# Patient Record
Sex: FEMALE | Race: WHITE | NOT HISPANIC OR LATINO | Employment: OTHER | URBAN - METROPOLITAN AREA
[De-identification: names, ages, dates, MRNs, and addresses within clinical notes are randomized per-mention and may not be internally consistent; named-entity substitution may affect disease eponyms.]

---

## 2018-04-23 ENCOUNTER — CONVERSION ENCOUNTER (OUTPATIENT)
Dept: RADIOLOGY | Facility: IMAGING CENTER | Age: 63
End: 2018-04-23

## 2019-08-27 ENCOUNTER — TRANSCRIBE ORDERS (OUTPATIENT)
Dept: ADMINISTRATIVE | Facility: HOSPITAL | Age: 64
End: 2019-08-27

## 2019-08-27 DIAGNOSIS — Z12.31 VISIT FOR SCREENING MAMMOGRAM: Primary | ICD-10-CM

## 2019-08-29 ENCOUNTER — HOSPITAL ENCOUNTER (OUTPATIENT)
Dept: RADIOLOGY | Facility: IMAGING CENTER | Age: 64
Discharge: HOME | End: 2019-08-29
Payer: MEDICARE

## 2019-08-29 VITALS — BODY MASS INDEX: 28.34 KG/M2 | WEIGHT: 166 LBS | HEIGHT: 64 IN

## 2019-08-29 DIAGNOSIS — Z12.31 VISIT FOR SCREENING MAMMOGRAM: ICD-10-CM

## 2019-08-29 PROCEDURE — 77067 SCR MAMMO BI INCL CAD: CPT

## 2019-09-03 ENCOUNTER — TRANSCRIBE ORDERS (OUTPATIENT)
Dept: ADMINISTRATIVE | Facility: HOSPITAL | Age: 64
End: 2019-09-03

## 2019-09-03 ENCOUNTER — APPOINTMENT (OUTPATIENT)
Dept: RADIOLOGY | Age: 64
End: 2019-09-03
Payer: MEDICARE

## 2019-09-03 DIAGNOSIS — M25.561 RIGHT KNEE PAIN, UNSPECIFIED CHRONICITY: ICD-10-CM

## 2019-09-03 DIAGNOSIS — M25.561 RIGHT KNEE PAIN, UNSPECIFIED CHRONICITY: Primary | ICD-10-CM

## 2019-09-03 PROCEDURE — 73562 X-RAY EXAM OF KNEE 3: CPT

## 2019-10-10 ENCOUNTER — OFFICE VISIT (OUTPATIENT)
Dept: FAMILY MEDICINE CLINIC | Facility: CLINIC | Age: 64
End: 2019-10-10
Payer: MEDICARE

## 2019-10-10 VITALS
SYSTOLIC BLOOD PRESSURE: 160 MMHG | HEART RATE: 48 BPM | WEIGHT: 170 LBS | HEIGHT: 63 IN | DIASTOLIC BLOOD PRESSURE: 80 MMHG | BODY MASS INDEX: 30.12 KG/M2 | OXYGEN SATURATION: 98 % | TEMPERATURE: 97 F

## 2019-10-10 DIAGNOSIS — Z23 NEED FOR PNEUMOCOCCAL VACCINATION: ICD-10-CM

## 2019-10-10 DIAGNOSIS — J31.0 CHRONIC RHINITIS: ICD-10-CM

## 2019-10-10 DIAGNOSIS — I10 BENIGN ESSENTIAL HYPERTENSION: Primary | ICD-10-CM

## 2019-10-10 DIAGNOSIS — I10 UNCONTROLLED HYPERTENSION: ICD-10-CM

## 2019-10-10 DIAGNOSIS — J45.30 MILD PERSISTENT ASTHMA WITHOUT COMPLICATION: ICD-10-CM

## 2019-10-10 DIAGNOSIS — Z23 NEED FOR VIRAL IMMUNIZATION: ICD-10-CM

## 2019-10-10 PROBLEM — F32.5 MAJOR DEPRESSIVE DISORDER WITH SINGLE EPISODE, IN FULL REMISSION (HCC): Status: ACTIVE | Noted: 2019-10-10

## 2019-10-10 PROBLEM — H65.21 RIGHT CHRONIC SEROUS OTITIS MEDIA: Status: ACTIVE | Noted: 2017-11-07

## 2019-10-10 PROCEDURE — 99204 OFFICE O/P NEW MOD 45 MIN: CPT | Performed by: FAMILY MEDICINE

## 2019-10-10 RX ORDER — LORATADINE 10 MG/1
10 TABLET ORAL
COMMUNITY
End: 2020-10-02 | Stop reason: ALTCHOICE

## 2019-10-10 RX ORDER — LOSARTAN POTASSIUM 100 MG/1
100 TABLET ORAL DAILY
Refills: 3 | COMMUNITY
Start: 2019-07-17 | End: 2019-10-10 | Stop reason: ALTCHOICE

## 2019-10-10 RX ORDER — METOPROLOL SUCCINATE 100 MG/1
100 TABLET, EXTENDED RELEASE ORAL DAILY
Refills: 1 | COMMUNITY
Start: 2019-08-13 | End: 2020-02-11 | Stop reason: SDUPTHER

## 2019-10-10 RX ORDER — OLMESARTAN MEDOXOMIL AND HYDROCHLOROTHIAZIDE 20/12.5 20; 12.5 MG/1; MG/1
TABLET ORAL
Qty: 60 TABLET | Refills: 2 | Status: SHIPPED | OUTPATIENT
Start: 2019-10-10 | End: 2019-11-01 | Stop reason: ALTCHOICE

## 2019-10-10 RX ORDER — ALBUTEROL SULFATE 90 UG/1
AEROSOL, METERED RESPIRATORY (INHALATION)
COMMUNITY
Start: 2017-08-03 | End: 2019-10-10 | Stop reason: ALTCHOICE

## 2019-10-10 RX ORDER — AMLODIPINE BESYLATE 5 MG/1
5 TABLET ORAL DAILY
Refills: 3 | COMMUNITY
Start: 2019-07-17 | End: 2020-04-14 | Stop reason: SDUPTHER

## 2019-10-10 NOTE — PROGRESS NOTES
Chief Complaint   Patient presents with    Blood Pressure Check        HPI     75year old female presents to this practice as a new patient, formally followed by me at previous practice  Past medical history significant for hypertension and asthma  She was seen in the emergency room this week because of elevated blood pressure and headache  Blood work was normal   CT scan of the brain was done and was okay  Current medications include metoprolol 100 mg q d , losartan 100 mg q d , amlodipine 5 mg, 1/2 tablet daily  Had been on hydrochlorothiazide in the past but it caused abnormal electrolytes she thinks  She has a log of her blood pressures in the last couple of days which systolics range between 051 and 180  Describes her headache as a 7-8  Taking over-the-counter migraine medication which includes caffeine and acetaminophen  Past history also includes depression and she states she is significantly improved with the use of Zoloft, currently on 50 mg daily  Has a dry cough in the morning  Also a chronic runny nose  In the past, has had bronchospasm and does have a Dulera inhaler but has not been using it  Past Medical History:   Diagnosis Date    Anxiety state 12/19/2014    Benign essential hypertension 1/14/2010    Chronic rhinitis 1/12/2012    Major depressive disorder with single episode, in full remission ([facility] [address] ) 10/10/2019    Mild persistent asthma without complication 0/0/5339    Right chronic serous otitis media 11/7/2017    Added automatically from request for surgery [id number]        Past Surgical History:   Procedure Laterality Date    LAPAROSCOPY      X2 for endometriosis       Social History     Tobacco Use    Smoking status: Never Smoker    Smokeless tobacco: Never Used   Substance Use Topics    Alcohol use: Not on file       Social History     Social History Narrative     since 1981  Two sons  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 reading, gardening, walking, needle work, painting, writing books  Review of Systems   Constitutional: Negative for activity change and appetite change  HENT: Positive for tinnitus ( continuous in both ears for the last 3 days)  Eyes: Negative for visual disturbance  Respiratory: Positive for cough  Negative for shortness of breath and wheezing  Cardiovascular: Negative for chest pain and leg swelling  Gastrointestinal: Negative for abdominal pain, constipation and diarrhea  Genitourinary: Negative for difficulty urinating  Musculoskeletal: Negative for arthralgias and back pain  Skin: Negative for rash  Neurological: Negative for headaches  Psychiatric/Behavioral: Negative for dysphoric mood  The patient is not nervous/anxious  /80 (BP Location: Left arm, Patient Position: Sitting, Cuff Size: Adult)   Pulse (!) 48   Temp (!) 97 °F (36 1 °C) (Tympanic)   Ht 5' 3 39" (1 61 m)   Wt 77 1 kg (170 lb)   SpO2 98%   BMI 29 75 kg/m²      Physical Exam     Pleasant female in no acute distress  Repeat blood pressure 190/86  Myringotomy tube present in wax in the right canal   The ear drum appears normal   The left drum and canal are also normal   Lungs are clear  Heart regular with a rate of 56  No ankle edema present  Mood is OK        Current Outpatient Medications:     Aluminum & Magnesium Hydroxide (MAGNESIUM-ALUMINUM PO), Take by mouth, Disp: , Rfl:     amLODIPine (NORVASC) 5 mg tablet, Take 5 mg by mouth daily Take half tablet daily, Disp: , Rfl: 3    loratadine (CLARITIN) 10 mg tablet, Take 10 mg by mouth, Disp: , Rfl:     metoprolol succinate (TOPROL-XL) 100 mg 24 hr tablet, Take 100 mg by mouth daily, Disp: , Rfl: 1    sertraline (ZOLOFT) 50 mg tablet, Take 50 mg by mouth daily, Disp: , Rfl: 1    olmesartan-hydrochlorothiazide (BENICAR HCT) 20-12 5 MG per tablet, 1-2 tablets daily for blood pressure, Disp: 60 tablet, Rfl: 2     No problem-specific Assessment & Plan notes found for this encounter  Diagnoses and all orders for this visit:    Benign essential hypertension  -     olmesartan-hydrochlorothiazide (BENICAR HCT) 20-12 5 MG per tablet; 1-2 tablets daily for blood pressure    Uncontrolled hypertension  -     Basic metabolic panel; Future    Chronic rhinitis    Mild persistent asthma without complication    Need for viral immunization  -     Cancel: influenza vaccine, 2252-7242, high-dose, PF 0 5 mL (FLUZONE HIGH-DOSE)    Need for pneumococcal vaccination  -     PNEUMOCOCCAL POLYSACCHARIDE VACCINE 23-VALENT =>3YO SQ IM    Other orders  -     Aluminum & Magnesium Hydroxide (MAGNESIUM-ALUMINUM PO); Take by mouth  -     Discontinue: albuterol (PROVENTIL HFA,VENTOLIN HFA) 90 mcg/act inhaler; Inhale  -     amLODIPine (NORVASC) 5 mg tablet; Take 5 mg by mouth daily Take half tablet daily  -     loratadine (CLARITIN) 10 mg tablet; Take 10 mg by mouth  -     Discontinue: losartan (COZAAR) 100 MG tablet; Take 100 mg by mouth daily  -     metoprolol succinate (TOPROL-XL) 100 mg 24 hr tablet; Take 100 mg by mouth daily  -     Discontinue: mometasone-formoterol (DULERA) 100-5 MCG/ACT inhaler; USE 2 PUFFS BY MOUTH TWICE A DAY  -     sertraline (ZOLOFT) 50 mg tablet; Take 50 mg by mouth daily        Patient Instructions    Review of ER visit and blood work  Blood pressure remains elevated  Stop losartan  Begin on olmesartan HCT 20/12 5,  Two tablets daily  If blood pressure drops too much, decreased to 1  Continue 100 mg of metoprolol and 2 5 mg of amlodipine  Mood appears to be well controlled with Zoloft 50 mg  Not needing anything for her chronic rhinitis  Asthma is under control  Discussion of screening colonoscopy which she will consider in the future  Keeping in mind that her mother used to give her enemas when she was young  Pneumovax vaccine   Not available today    Can be obtained at next visit or at the drugstore  High-dose flu shot not available today  [facility]  Recheck 3 weeks with blood work a few days before

## 2019-10-10 NOTE — PATIENT INSTRUCTIONS
Catheter is inserted into the left coronary artery. Angiography performed of the left coronary arteries in multiple views. Angiography performed via hand injection with .  at 4 L/min.  at 8%.  Review of ER visit and blood work  Blood pressure remains elevated  Stop losartan  Begin on olmesartan HCT 20/12 5,  Two tablets daily  If blood pressure drops too much, decreased to 1  Continue 100 mg of metoprolol and 2 5 mg of amlodipine  Mood appears to be well controlled with Zoloft 50 mg  Not needing anything for her chronic rhinitis  Asthma is under control  Discussion of screening colonoscopy which she will consider in the future  Keeping in mind that her mother used to give her enemas when she was young  Pneumovax vaccine   Not available today  Can be obtained at next visit or at the drugstore  High-dose flu shot not available today  Will give at next visit or she can obtain in drugstore  Recheck 3 weeks with blood work a few days before

## 2019-10-24 DIAGNOSIS — Z12.11 COLON CANCER SCREENING: ICD-10-CM

## 2019-10-24 DIAGNOSIS — Z11.59 ENCOUNTER FOR HEPATITIS C SCREENING TEST FOR LOW RISK PATIENT: Primary | ICD-10-CM

## 2019-10-28 ENCOUNTER — APPOINTMENT (OUTPATIENT)
Dept: LAB | Age: 64
End: 2019-10-28
Payer: MEDICARE

## 2019-10-28 DIAGNOSIS — Z11.59 ENCOUNTER FOR HEPATITIS C SCREENING TEST FOR LOW RISK PATIENT: ICD-10-CM

## 2019-10-28 DIAGNOSIS — I10 UNCONTROLLED HYPERTENSION: ICD-10-CM

## 2019-10-28 LAB
ANION GAP SERPL CALCULATED.3IONS-SCNC: 8 MMOL/L (ref 4–13)
BUN SERPL-MCNC: 12 MG/DL (ref 5–25)
CALCIUM SERPL-MCNC: 9.8 MG/DL (ref 8.3–10.1)
CHLORIDE SERPL-SCNC: 94 MMOL/L (ref 100–108)
CO2 SERPL-SCNC: 27 MMOL/L (ref 21–32)
CREAT SERPL-MCNC: 0.66 MG/DL (ref 0.6–1.3)
GFR SERPL CREATININE-BSD FRML MDRD: 92 ML/MIN/1.73SQ M
GLUCOSE SERPL-MCNC: 103 MG/DL (ref 65–140)
HCV AB SER QL: NORMAL
POTASSIUM SERPL-SCNC: 4.8 MMOL/L (ref 3.5–5.3)
SODIUM SERPL-SCNC: 129 MMOL/L (ref 136–145)

## 2019-10-28 PROCEDURE — 80048 BASIC METABOLIC PNL TOTAL CA: CPT

## 2019-10-28 PROCEDURE — 36415 COLL VENOUS BLD VENIPUNCTURE: CPT

## 2019-10-28 PROCEDURE — 86803 HEPATITIS C AB TEST: CPT

## 2019-11-01 ENCOUNTER — OFFICE VISIT (OUTPATIENT)
Dept: FAMILY MEDICINE CLINIC | Facility: CLINIC | Age: 64
End: 2019-11-01
Payer: MEDICARE

## 2019-11-01 VITALS
TEMPERATURE: 96.6 F | WEIGHT: 169 LBS | HEART RATE: 60 BPM | SYSTOLIC BLOOD PRESSURE: 142 MMHG | OXYGEN SATURATION: 94 % | BODY MASS INDEX: 28.85 KG/M2 | HEIGHT: 64 IN | DIASTOLIC BLOOD PRESSURE: 80 MMHG

## 2019-11-01 DIAGNOSIS — Z23 NEED FOR PNEUMOCOCCAL VACCINATION: ICD-10-CM

## 2019-11-01 DIAGNOSIS — F32.5 MAJOR DEPRESSIVE DISORDER WITH SINGLE EPISODE, IN FULL REMISSION (HCC): ICD-10-CM

## 2019-11-01 DIAGNOSIS — R51.9 NONINTRACTABLE EPISODIC HEADACHE, UNSPECIFIED HEADACHE TYPE: ICD-10-CM

## 2019-11-01 DIAGNOSIS — I10 BENIGN ESSENTIAL HYPERTENSION: Primary | ICD-10-CM

## 2019-11-01 DIAGNOSIS — E87.1 HYPONATREMIA: ICD-10-CM

## 2019-11-01 DIAGNOSIS — Z00.00 MEDICARE ANNUAL WELLNESS VISIT, SUBSEQUENT: ICD-10-CM

## 2019-11-01 DIAGNOSIS — Z23 NEED FOR INFLUENZA VACCINATION: ICD-10-CM

## 2019-11-01 PROCEDURE — G0438 PPPS, INITIAL VISIT: HCPCS

## 2019-11-01 PROCEDURE — G0008 ADMIN INFLUENZA VIRUS VAC: HCPCS

## 2019-11-01 PROCEDURE — 99214 OFFICE O/P EST MOD 30 MIN: CPT

## 2019-11-01 PROCEDURE — G0009 ADMIN PNEUMOCOCCAL VACCINE: HCPCS

## 2019-11-01 PROCEDURE — 90732 PPSV23 VACC 2 YRS+ SUBQ/IM: CPT

## 2019-11-01 PROCEDURE — 90662 IIV NO PRSV INCREASED AG IM: CPT

## 2019-11-01 RX ORDER — OLMESARTAN MEDOXOMIL 40 MG/1
40 TABLET ORAL DAILY
Qty: 90 TABLET | Refills: 3 | Status: SHIPPED | OUTPATIENT
Start: 2019-11-01 | End: 2020-12-03 | Stop reason: SDUPTHER

## 2019-11-01 NOTE — PROGRESS NOTES
Chief Complaint   Patient presents with    Medicare Wellness Visit    Hypertension    Depression        HPI   Here for follow-up of hypertension and Medicare wellness exam     Jason Ziegler all this started in HCT and blood pressure readings are considerably improved  Most of the readings in the last week showed systolics between 835 and 140  She did decrease the Benicar HCT to 1 tablet instead of 2 because the blood pressures were okay  She does continue with headaches which respond to 2 extra Strength Tylenol, usually twice a day  Mood is well controlled with Zoloft  Presently has a cold which she blames on her grandchild  Notes that she has cut back significantly on alcohol  She uses a crutch when things were bad at home  Spent some time with her brother who is a recovering alcoholic and found that she did not need the alcohol  Subsequently, things are better at home  Past Medical History:   Diagnosis Date    Anxiety state 12/19/2014    Benign essential hypertension 1/14/2010    Chronic rhinitis 1/12/2012    Major depressive disorder with single episode, in full remission ([facility] [address] ) 10/10/2019    Mild persistent asthma without complication 6/5/6338    Right chronic serous otitis media 11/7/2017    Added automatically from request for surgery [id number]        Past Surgical History:   Procedure Laterality Date    LAPAROSCOPY      X2 for endometriosis       Social History     Tobacco Use    Smoking status: Never Smoker    Smokeless tobacco: Never Used   Substance Use Topics    Alcohol use: Yes     Comment: socially       Social History     Social History Narrative     since 1981  Two sons  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, gardening, walking, needle work, painting, writing books          The following portions of the patient's history were reviewed and updated as appropriate: allergies, current medications, past family history, past medical history, past social history, past surgical history and problem list       Review of Systems   Constitutional: Negative for activity change and appetite change  Eyes: Negative for visual disturbance  Respiratory: Negative for shortness of breath and wheezing  Cardiovascular: Negative for chest pain and leg swelling  Gastrointestinal: Negative for abdominal pain, constipation and diarrhea  Genitourinary: Negative for difficulty urinating  Musculoskeletal: Negative for arthralgias and back pain  Skin: Negative for rash  Neurological: Negative for headaches  Psychiatric/Behavioral: Negative for dysphoric mood  The patient is not nervous/anxious  /80   Pulse 60   Temp (!) 96 6 °F (35 9 °C) (Tympanic)   Ht 5' 3 78" (1 62 m)   Wt 76 7 kg (169 lb)   SpO2 94%   BMI 29 21 kg/m²      Physical Exam     Appears well  Log of blood pressures reviewed  Lungs are clear  Heart regular  Affect is bright  Current Outpatient Medications:     Aluminum & Magnesium Hydroxide (MAGNESIUM-ALUMINUM PO), Take by mouth, Disp: , Rfl:     amLODIPine (NORVASC) 5 mg tablet, Take 5 mg by mouth daily Take half tablet daily, Disp: , Rfl: 3    loratadine (CLARITIN) 10 mg tablet, Take 10 mg by mouth, Disp: , Rfl:     metoprolol succinate (TOPROL-XL) 100 mg 24 hr tablet, Take 100 mg by mouth daily, Disp: , Rfl: 1    olmesartan (BENICAR) 40 mg tablet, Take 1 tablet (40 mg total) by mouth daily, Disp: 90 tablet, Rfl: 3    sertraline (ZOLOFT) 50 mg tablet, Take 1 tablet (50 mg total) by mouth daily, Disp: 90 tablet, Rfl: 3     No problem-specific Assessment & Plan notes found for this encounter  Diagnoses and all orders for this visit:    Benign essential hypertension  -     olmesartan (BENICAR) 40 mg tablet; Take 1 tablet (40 mg total) by mouth daily    Hyponatremia  -     Basic metabolic panel;  Future    Medicare annual wellness visit, subsequent    Major depressive disorder with single episode, in full remission ([facility] [address] )  -     sertraline (ZOLOFT) 50 mg tablet; Take 1 tablet (50 mg total) by mouth daily    Need for influenza vaccination  -     influenza vaccine, 4645-4526, high-dose, PF 0 5 mL (FLUZONE HIGH-DOSE)    Nonintractable episodic headache, unspecified headache type    Need for pneumococcal vaccination  -     PNEUMOCOCCAL POLYSACCHARIDE VACCINE 23-VALENT =>1YO SQ IM        Patient Instructions    Blood pressure is significantly improved  Sodium is still low and the hyponatremia may be causing headaches  Change olmesartan HCT to olmesartan alone 40 mg once daily  Continue amlodipine and metoprolol  continue sertraline 50 mg daily which is working well for her mood  Okay to use Tylenol for headaches which hopefully will go away with stopping the diuretic  Try to decrease water intake  Flu shot and Pneumovax 23  Medicare wellness exam is completed  Recheck in 3 months with blood work to include sodium 1 week before  Weight Management   AMBULATORY CARE:   Why it is important to manage your weight:  Being overweight increases your risk of health conditions such as heart disease, high blood pressure, type 2 diabetes, and certain types of cancer  It can also increase your risk for osteoarthritis, sleep apnea, and other respiratory problems  Aim for a slow, steady weight loss  Even a small amount of weight loss can lower your risk of health problems  How to lose weight safely:  A safe and healthy way to lose weight is to eat fewer calories and get regular exercise  You can lose up about 1 pound a week by decreasing the number of calories you eat by 500 calories each day  You can decrease calories by eating smaller portion sizes or by cutting out high-calorie foods  Read labels to find out how many calories are in the foods you eat  You can also burn calories with exercise such as walking, swimming, or biking   You will be more likely to keep weight off if you make these changes part of your lifestyle  Healthy meal plan for weight management:  A healthy meal plan includes a variety of foods, contains fewer calories, and helps you stay healthy  A healthy meal plan includes the following:  · Eat whole-grain foods more often  A healthy meal plan should contain fiber  Fiber is the part of grains, fruits, and vegetables that is not broken down by your body  Whole-grain foods are healthy and provide extra fiber in your diet  Some examples of whole-grain foods are whole-wheat breads and pastas, oatmeal, brown rice, and bulgur  · Eat a variety of vegetables every day  Include dark, leafy greens such as spinach, kale, shukri greens, and mustard greens  Eat yellow and orange vegetables such as carrots, sweet potatoes, and winter squash  · Eat a variety of fruits every day  Choose fresh or canned fruit (canned in its own juice or light syrup) instead of juice  Fruit juice has very little or no fiber  · Eat low-fat dairy foods  Drink fat-free (skim) milk or 1% milk  Eat fat-free yogurt and low-fat cottage cheese  Try low-fat cheeses such as mozzarella and other reduced-fat cheeses  · Choose meat and other protein foods that are low in fat  Choose beans or other legumes such as split peas or lentils  Choose fish, skinless poultry (chicken or turkey), or lean cuts of red meat (beef or pork)  Before you cook meat or poultry, cut off any visible fat  · Use less fat and oil  Try baking foods instead of frying them  Add less fat, such as margarine, sour cream, regular salad dressing and mayonnaise to foods  Eat fewer high-fat foods  Some examples of high-fat foods include french fries, doughnuts, ice cream, and cakes  · Eat fewer sweets  Limit foods and drinks that are high in sugar  This includes candy, cookies, regular soda, and sweetened drinks  Ways to decrease calories:   · Eat smaller portions       ¨ Use a small plate with smaller servings  ¨ Do not eat second helpings  ¨ When you eat at a restaurant, ask for a box and place half of your meal in the box before you eat  ¨ Share an entrée with someone else  · Replace high-calorie snacks with healthy, low-calorie snacks  ¨ Choose fresh fruit, vegetables, fat-free rice cakes, or air-popped popcorn instead of potato chips, nuts, or chocolate  ¨ Choose water or calorie-free drinks instead of soda or sweetened drinks  · Eat regular meals  Skipping meals can lead to overeating later in the day  Eat a healthy snack in place of a meal if you do not have time to eat a regular meal      · Do not shop for groceries when you are hungry  You may be more likely to make unhealthy food choices  Take a grocery list of healthy foods and shop after you have eaten  Exercise:  Exercise at least 30 minutes per day on most days of the week  Some examples of exercise include walking, biking, dancing, and swimming  You can also fit in more physical activity by taking the stairs instead of the elevator or parking farther away from stores  Ask your healthcare provider about the best exercise plan for you  Other things to consider as you try to lose weight:   · Be aware of situations that may give you the urge to overeat, such as eating while watching television  Find ways to avoid these situations  For example, read a book, go for a walk, or do crafts  · Meet with a weight loss support group or friends who are also trying to lose weight  This may help you stay motivated to continue working on your weight loss goals  © 2017 [employer]  Information is for End User's use only and may not be sold, redistributed or otherwise used for commercial purposes  All illustrations and images included in CareNotes® are the copyrighted property of [employer]  or [employer]  The above information is an  only   It is not intended as medical advice for individual conditions or treatments  Talk to your doctor, nurse or pharmacist before following any medical regimen to see if it is safe and effective for you  Heart Healthy Diet   AMBULATORY CARE:   A heart healthy diet  is an eating plan low in total fat, unhealthy fats, and sodium (salt)  A heart healthy diet helps decrease your risk for heart disease and stroke  Limit the amount of fat you eat to 25% to 35% of your total daily calories  Limit sodium to less than 2,300 mg each day  Healthy fats:  Healthy fats can help improve cholesterol levels  The risk for heart disease is decreased when cholesterol levels are normal  Choose healthy fats, such as the following:  · Unsaturated fat  is found in foods such as soybean, canola, olive, corn, and safflower oils  It is also found in soft tub margarine that is made with liquid vegetable oil  · Omega-3 fat  is found in certain fish, such as salmon, tuna, and trout, and in walnuts and flaxseed  Unhealthy fats:  Unhealthy fats can cause unhealthy cholesterol levels in your blood and increase your risk of heart disease  Limit unhealthy fats, such as the following:  · Cholesterol  is found in animal foods, such as eggs and lobster, and in dairy products made from whole milk  Limit cholesterol to less than 300 milligrams (mg) each day  You may need to limit cholesterol to 200 mg each day if you have heart disease  · Saturated fat  is found in meats, such as johnston and hamburger  It is also found in chicken or turkey skin, whole milk, and butter  Limit saturated fat to less than 7% of your total daily calories  Limit saturated fat to less than 6% if you have heart disease or are at increased risk for it  · Trans fat  is found in packaged foods, such as potato chips and cookies  It is also in hard margarine, some fried foods, and shortening  Avoid trans fats as much as possible    Heart healthy foods and drinks to include:  Ask your dietitian or healthcare provider how many servings to have from each of the following food groups:  · Grains:      ¨ Whole-wheat breads, cereals, and pastas, and brown rice    ¨ Low-fat, low-sodium crackers and chips    · Vegetables:      ¨ Broccoli, green beans, green peas, and spinach    ¨ Collards, kale, and lima beans    ¨ Carrots, sweet potatoes, tomatoes, and peppers    ¨ Canned vegetables with no salt added    · Fruits:      ¨ Bananas, peaches, pears, and pineapple    ¨ Grapes, raisins, and dates    ¨ Oranges, tangerines, grapefruit, orange juice, and grapefruit juice    ¨ Apricots, mangoes, melons, and papaya    ¨ Raspberries and strawberries    ¨ Canned fruit with no added sugar    · Low-fat dairy products:      ¨ Nonfat (skim) milk, 1% milk, and low-fat almond, cashew, or soy milks fortified with calcium    ¨ Low-fat cheese, regular or frozen yogurt, and cottage cheese    · Meats and proteins , such as lean cuts of beef and pork (loin, leg, round), skinless chicken and turkey, legumes, soy products, egg whites, and nuts  Foods and drinks to limit or avoid:  Ask your dietitian or healthcare provider about these and other foods that are high in unhealthy fat, sodium, and sugar:  · Snack or packaged foods , such as frozen dinners, cookies, macaroni and cheese, and cereals with more than 300 mg of sodium per serving    · Canned or dry mixes  for cakes, soups, sauces, or gravies    · Vegetables with added sodium , such as instant potatoes, vegetables with added sauces, or regular canned vegetables    · Other foods high in sodium , such as ketchup, barbecue sauce, salad dressing, pickles, olives, soy sauce, and miso    · High-fat dairy foods  such as whole or 2% milk, cream cheese, or sour cream, and cheeses     · High-fat protein foods  such as high-fat cuts of beef (T-bone steaks, ribs), chicken or turkey with skin, and organ meats, such as liver    · Cured or smoked meats , such as hot dogs, johnston, and sausage    · Unhealthy fats and oils , such as butter, stick margarine, shortening, and cooking oils such as coconut or palm oil    · Food and drinks high in sugar , such as soft drinks (soda), sports drinks, sweetened tea, candy, cake, cookies, pies, and doughnuts  Other diet guidelines to follow:   · Eat more foods containing omega-3 fats  Eat fish high in omega-3 fats at least 2 times a week  · Limit alcohol  Too much alcohol can damage your heart and raise your blood pressure  Women should limit alcohol to 1 drink a day  Men should limit alcohol to 2 drinks a day  A drink of alcohol is 12 ounces of beer, 5 ounces of wine, or 1½ ounces of liquor  · Choose low-sodium foods  High-sodium foods can lead to high blood pressure  Add little or no salt to food you prepare  Use herbs and spices in place of salt  · Eat more fiber  to help lower cholesterol levels  Eat at least 5 servings of fruits and vegetables each day  Eat 3 ounces of whole-grain foods each day  Legumes (beans) are also a good source of fiber  Lifestyle guidelines:   · Do not smoke  Nicotine and other chemicals in cigarettes and cigars can cause lung and heart damage  Ask your healthcare provider for information if you currently smoke and need help to quit  E-cigarettes or smokeless tobacco still contain nicotine  Talk to your healthcare provider before you use these products  · Exercise regularly  to help you maintain a healthy weight and improve your blood pressure and cholesterol levels  Ask your healthcare provider about the best exercise plan for you  Do not start an exercise program without asking your healthcare provider  Follow up with your healthcare provider as directed:  Write down your questions so you remember to ask them during your visits  © 2017 [employer] Information is for End User's use only and may not be sold, redistributed or otherwise used for commercial purposes   All illustrations and images included in CareNotes® are the copyrighted property of [employer]  or [employer]  The above information is an  only  It is not intended as medical advice for individual conditions or treatments  Talk to your doctor, nurse or pharmacist before following any medical regimen to see if it is safe and effective for you  BMI Counseling: Body mass index is 29 21 kg/m²  The BMI is above normal  Nutrition recommendations include reducing portion sizes, 3-5 servings of fruits/vegetables daily, reducing fast food intake, consuming healthier snacks and decreasing soda and/or juice intake

## 2019-11-01 NOTE — PROGRESS NOTES
Justa Peng is here for her Welcome to Medicare visit       Health Risk Assessment:   Patient rates overall health as good  Patient feels that their physical health rating is same  Eyesight was rated as same  Hearing was rated as slightly worse  Patient feels that their emotional and mental health rating is same  Pain experienced in the last 7 days has been some  Patient's pain rating has been 5/10  Patient states that she has experienced no weight loss or gain in last 6 months       Depression Screening:   PHQ-2 Score: 0  PHQ-9 Score: 0       Fall Risk Screening: In the past year, patient has experienced: no history of falling in past year       Urinary Incontinence Screening:   Patient has leaked urine accidently in the last six months  Little leaking     Home Safety:  Patient does not have trouble with stairs inside or outside of their home  Patient has working smoke alarms and has no working carbon monoxide detector  Home safety hazards include: none       Nutrition:   Current diet is Regular       Medications:   Patient is currently taking over-the-counter supplements  OTC medications include: see medication list  Patient is able to manage medications       Activities of Daily Living (ADLs)/Instrumental Activities of Daily Living (IADLs):   Walk and transfer into and out of bed and chair?: Yes  Dress and groom yourself?: Yes    Bathe or shower yourself?: Yes    Feed yourself? Yes  Do your laundry/housekeeping?: Yes  Manage your money, pay your bills and track your expenses?: Yes  Make your own meals?: Yes    Do your own shopping?: Yes     Previous Hospitalizations:   Any hospitalizations or ED visits within the last 12 months?: No       Advance Care Planning:   Living will: Yes    Advanced directive:  Yes       PREVENTIVE SCREENINGS       Cardiovascular Screening:    General: Screening Current       Diabetes Screening:     General: Screening Current       Breast Cancer Screening:     General: Screening Current    Cervical Cancer Screening:    General: Screening Not Indicated       Hepatitis C Screening:    General: Screening Current

## 2019-11-01 NOTE — PROGRESS NOTES
Aníbal Pedroza is here for her Welcome to Medicare visit  Health Risk Assessment:   Patient rates overall health as good  Patient feels that their physical health rating is same  Eyesight was rated as same  Hearing was rated as slightly worse  Patient feels that their emotional and mental health rating is same  Pain experienced in the last 7 days has been some  Patient's pain rating has been 5/10  Patient states that she has experienced no weight loss or gain in last 6 months  Depression Screening:   PHQ-2 Score: 0  PHQ-9 Score: 0      Fall Risk Screening: In the past year, patient has experienced: no history of falling in past year      Urinary Incontinence Screening:   Patient has leaked urine accidently in the last six months  Little leaking    Home Safety:  Patient does not have trouble with stairs inside or outside of their home  Patient has working smoke alarms and has no working carbon monoxide detector  Home safety hazards include: none  Nutrition:   Current diet is Regular  Medications:   Patient is currently taking over-the-counter supplements  OTC medications include: see medication list  Patient is able to manage medications  Activities of Daily Living (ADLs)/Instrumental Activities of Daily Living (IADLs):   Walk and transfer into and out of bed and chair?: Yes  Dress and groom yourself?: Yes    Bathe or shower yourself?: Yes    Feed yourself?  Yes  Do your laundry/housekeeping?: Yes  Manage your money, pay your bills and track your expenses?: Yes  Make your own meals?: Yes    Do your own shopping?: Yes    Previous Hospitalizations:   Any hospitalizations or ED visits within the last 12 months?: No      Advance Care Planning:   Living will: Yes    Advanced directive: Yes      PREVENTIVE SCREENINGS      Cardiovascular Screening:    General: Screening Current      Diabetes Screening:     General: Screening Current      Breast Cancer Screening:     General: Screening Current      Cervical Cancer Screening:    General: Screening Not Indicated      Hepatitis C Screening:    General: Screening Current

## 2019-11-01 NOTE — PATIENT INSTRUCTIONS
Blood pressure is significantly improved  Sodium is still low and the hyponatremia may be causing headaches  Change olmesartan HCT to olmesartan alone 40 mg once daily  Continue amlodipine and metoprolol  continue sertraline 50 mg daily which is working well for her mood  Okay to use Tylenol for headaches which hopefully will go away with stopping the diuretic  Try to decrease water intake  Flu shot and Pneumovax 23  Medicare wellness exam is completed  Recheck in 3 months with blood work to include sodium 1 week before  Weight Management   AMBULATORY CARE:   Why it is important to manage your weight:  Being overweight increases your risk of health conditions such as heart disease, high blood pressure, type 2 diabetes, and certain types of cancer  It can also increase your risk for osteoarthritis, sleep apnea, and other respiratory problems  Aim for a slow, steady weight loss  Even a small amount of weight loss can lower your risk of health problems  How to lose weight safely:  A safe and healthy way to lose weight is to eat fewer calories and get regular exercise  You can lose up about 1 pound a week by decreasing the number of calories you eat by 500 calories each day  You can decrease calories by eating smaller portion sizes or by cutting out high-calorie foods  Read labels to find out how many calories are in the foods you eat  You can also burn calories with exercise such as walking, swimming, or biking  You will be more likely to keep weight off if you make these changes part of your lifestyle  Healthy meal plan for weight management:  A healthy meal plan includes a variety of foods, contains fewer calories, and helps you stay healthy  A healthy meal plan includes the following:  · Eat whole-grain foods more often  A healthy meal plan should contain fiber  Fiber is the part of grains, fruits, and vegetables that is not broken down by your body   Whole-grain foods are healthy and provide extra fiber in your diet  Some examples of whole-grain foods are whole-wheat breads and pastas, oatmeal, brown rice, and bulgur  · Eat a variety of vegetables every day  Include dark, leafy greens such as spinach, kale, shukri greens, and mustard greens  Eat yellow and orange vegetables such as carrots, sweet potatoes, and winter squash  · Eat a variety of fruits every day  Choose fresh or canned fruit (canned in its own juice or light syrup) instead of juice  Fruit juice has very little or no fiber  · Eat low-fat dairy foods  Drink fat-free (skim) milk or 1% milk  Eat fat-free yogurt and low-fat cottage cheese  Try low-fat cheeses such as mozzarella and other reduced-fat cheeses  · Choose meat and other protein foods that are low in fat  Choose beans or other legumes such as split peas or lentils  Choose fish, skinless poultry (chicken or turkey), or lean cuts of red meat (beef or pork)  Before you cook meat or poultry, cut off any visible fat  · Use less fat and oil  Try baking foods instead of frying them  Add less fat, such as margarine, sour cream, regular salad dressing and mayonnaise to foods  Eat fewer high-fat foods  Some examples of high-fat foods include french fries, doughnuts, ice cream, and cakes  · Eat fewer sweets  Limit foods and drinks that are high in sugar  This includes candy, cookies, regular soda, and sweetened drinks  Ways to decrease calories:   · Eat smaller portions  ¨ Use a small plate with smaller servings  ¨ Do not eat second helpings  ¨ When you eat at a restaurant, ask for a box and place half of your meal in the box before you eat  ¨ Share an entrée with someone else  · Replace high-calorie snacks with healthy, low-calorie snacks  ¨ Choose fresh fruit, vegetables, fat-free rice cakes, or air-popped popcorn instead of potato chips, nuts, or chocolate      ¨ Choose water or calorie-free drinks instead of soda or sweetened drinks  · Eat regular meals  Skipping meals can lead to overeating later in the day  Eat a healthy snack in place of a meal if you do not have time to eat a regular meal      · Do not shop for groceries when you are hungry  You may be more likely to make unhealthy food choices  Take a grocery list of healthy foods and shop after you have eaten  Exercise:  Exercise at least 30 minutes per day on most days of the week  Some examples of exercise include walking, biking, dancing, and swimming  You can also fit in more physical activity by taking the stairs instead of the elevator or parking farther away from stores  Ask your healthcare provider about the best exercise plan for you  Other things to consider as you try to lose weight:   · Be aware of situations that may give you the urge to overeat, such as eating while watching television  Find ways to avoid these situations  For example, read a book, go for a walk, or do crafts  · Meet with a weight loss support group or friends who are also trying to lose weight  This may help you stay motivated to continue working on your weight loss goals  © 2017 [address] [facility] Information is for End User's use only and may not be sold, redistributed or otherwise used for commercial purposes  All illustrations and images included in CareNotes® are the copyrighted property of [employer]  or [employer]  The above information is an  only  It is not intended as medical advice for individual conditions or treatments  Talk to your doctor, nurse or pharmacist before following any medical regimen to see if it is safe and effective for you  Heart Healthy Diet   AMBULATORY CARE:   A heart healthy diet  is an eating plan low in total fat, unhealthy fats, and sodium (salt)  A heart healthy diet helps decrease your risk for heart disease and stroke  Limit the amount of fat you eat to 25% to 35% of your total daily calories   Limit sodium to less than 2,300 mg each day  Healthy fats:  Healthy fats can help improve cholesterol levels  The risk for heart disease is decreased when cholesterol levels are normal  Choose healthy fats, such as the following:  · Unsaturated fat  is found in foods such as soybean, canola, olive, corn, and safflower oils  It is also found in soft tub margarine that is made with liquid vegetable oil  · Omega-3 fat  is found in certain fish, such as salmon, tuna, and trout, and in walnuts and flaxseed  Unhealthy fats:  Unhealthy fats can cause unhealthy cholesterol levels in your blood and increase your risk of heart disease  Limit unhealthy fats, such as the following:  · Cholesterol  is found in animal foods, such as eggs and lobster, and in dairy products made from whole milk  Limit cholesterol to less than 300 milligrams (mg) each day  You may need to limit cholesterol to 200 mg each day if you have heart disease  · Saturated fat  is found in meats, such as johnston and hamburger  It is also found in chicken or turkey skin, whole milk, and butter  Limit saturated fat to less than 7% of your total daily calories  Limit saturated fat to less than 6% if you have heart disease or are at increased risk for it  · Trans fat  is found in packaged foods, such as potato chips and cookies  It is also in hard margarine, some fried foods, and shortening  Avoid trans fats as much as possible    Heart healthy foods and drinks to include:  Ask your dietitian or healthcare provider how many servings to have from each of the following food groups:  · Grains:      ¨ Whole-wheat breads, cereals, and pastas, and brown rice    ¨ Low-fat, low-sodium crackers and chips    · Vegetables:      ¨ Broccoli, green beans, green peas, and spinach    ¨ Collards, kale, and lima beans    ¨ Carrots, sweet potatoes, tomatoes, and peppers    ¨ Canned vegetables with no salt added    · Fruits:      ¨ Bananas, peaches, pears, and pineapple    ¨ Grapes, raisins, and dates    ¨ Oranges, tangerines, grapefruit, orange juice, and grapefruit juice    ¨ Apricots, mangoes, melons, and papaya    ¨ Raspberries and strawberries    ¨ Canned fruit with no added sugar    · Low-fat dairy products:      ¨ Nonfat (skim) milk, 1% milk, and low-fat almond, cashew, or soy milks fortified with calcium    ¨ Low-fat cheese, regular or frozen yogurt, and cottage cheese    · Meats and proteins , such as lean cuts of beef and pork (loin, leg, round), skinless chicken and turkey, legumes, soy products, egg whites, and nuts  Foods and drinks to limit or avoid:  Ask your dietitian or healthcare provider about these and other foods that are high in unhealthy fat, sodium, and sugar:  · Snack or packaged foods , such as frozen dinners, cookies, macaroni and cheese, and cereals with more than 300 mg of sodium per serving    · Canned or dry mixes  for cakes, soups, sauces, or gravies    · Vegetables with added sodium , such as instant potatoes, vegetables with added sauces, or regular canned vegetables    · Other foods high in sodium , such as ketchup, barbecue sauce, salad dressing, pickles, olives, soy sauce, and miso    · High-fat dairy foods  such as whole or 2% milk, cream cheese, or sour cream, and cheeses     · High-fat protein foods  such as high-fat cuts of beef (T-bone steaks, ribs), chicken or turkey with skin, and organ meats, such as liver    · Cured or smoked meats , such as hot dogs, johnston, and sausage    · Unhealthy fats and oils , such as butter, stick margarine, shortening, and cooking oils such as coconut or palm oil    · Food and drinks high in sugar , such as soft drinks (soda), sports drinks, sweetened tea, candy, cake, cookies, pies, and doughnuts  Other diet guidelines to follow:   · Eat more foods containing omega-3 fats  Eat fish high in omega-3 fats at least 2 times a week  · Limit alcohol  Too much alcohol can damage your heart and raise your blood pressure  Women should limit alcohol to 1 drink a day  Men should limit alcohol to 2 drinks a day  A drink of alcohol is 12 ounces of beer, 5 ounces of wine, or 1½ ounces of liquor  · Choose low-sodium foods  High-sodium foods can lead to high blood pressure  Add little or no salt to food you prepare  Use herbs and spices in place of salt  · Eat more fiber  to help lower cholesterol levels  Eat at least 5 servings of fruits and vegetables each day  Eat 3 ounces of whole-grain foods each day  Legumes (beans) are also a good source of fiber  Lifestyle guidelines:   · Do not smoke  Nicotine and other chemicals in cigarettes and cigars can cause lung and heart damage  Ask your healthcare provider for information if you currently smoke and need help to quit  E-cigarettes or smokeless tobacco still contain nicotine  Talk to your healthcare provider before you use these products  · Exercise regularly  to help you maintain a healthy weight and improve your blood pressure and cholesterol levels  Ask your healthcare provider about the best exercise plan for you  Do not start an exercise program without asking your healthcare provider  Follow up with your healthcare provider as directed:  Write down your questions so you remember to ask them during your visits  © 2017 [address] [facility] Information is for End User's use only and may not be sold, redistributed or otherwise used for commercial purposes  All illustrations and images included in CareNotes® are the copyrighted property of [employer]  or [employer]  The above information is an  only  It is not intended as medical advice for individual conditions or treatments  Talk to your doctor, nurse or pharmacist before following any medical regimen to see if it is safe and effective for you

## 2019-11-15 ENCOUNTER — OFFICE VISIT (OUTPATIENT)
Dept: FAMILY MEDICINE CLINIC | Facility: CLINIC | Age: 64
End: 2019-11-15
Payer: MEDICARE

## 2019-11-15 VITALS
TEMPERATURE: 98 F | BODY MASS INDEX: 29.06 KG/M2 | HEIGHT: 64 IN | OXYGEN SATURATION: 98 % | WEIGHT: 170.2 LBS | RESPIRATION RATE: 18 BRPM | SYSTOLIC BLOOD PRESSURE: 122 MMHG | DIASTOLIC BLOOD PRESSURE: 78 MMHG | HEART RATE: 53 BPM

## 2019-11-15 DIAGNOSIS — M70.50 ANSERINE BURSITIS: Primary | ICD-10-CM

## 2019-11-15 PROCEDURE — 99213 OFFICE O/P EST LOW 20 MIN: CPT | Performed by: FAMILY MEDICINE

## 2019-11-15 RX ORDER — MELOXICAM 15 MG/1
15 TABLET ORAL DAILY
Qty: 90 TABLET | Refills: 3 | Status: SHIPPED | OUTPATIENT
Start: 2019-11-15 | End: 2020-10-02 | Stop reason: ALTCHOICE

## 2019-11-15 NOTE — PROGRESS NOTES
Chief Complaint   Patient presents with    Knee Pain     right, patient had pain for the last two months  X-rays were previous done        HPI   Here because of pain in the right knee  Symptoms present for the last 6 months  She received 2 cortisone injections from Dr Mouna Santacruz which she states were not helpful  She can only function if she takes full-dose ibuprofen along with Tylenol  Today she took 800 mg of ibuprofen in the morning and plans on doing it 2 more times  The last injection was just 3 days ago and may have been more in the area of the anserine bursa  She describes the pain as being on the inferior aspect of the medial aspect of the knee  A few months ago, she had a fullness behind the knee which felt tight when she was kneeling in the garden  That is no longer bothersome  X-ray of her right knee was read is unremarkable  On exam today, the does not appear to be significant arthritis  Past Medical History:   Diagnosis Date    Anxiety state 12/19/2014    Benign essential hypertension 1/14/2010    Chronic rhinitis 1/12/2012    Major depressive [facility] with single episode[facility] 10/10/2019    Mild persistent asthma without complication 1/0/9059    Right chronic serous otitis media 11/7/2017    Added automatically from request for surgery [id number]        Past Surgical History:   Procedure Laterality Date    LAPAROSCOPY      X2 for endometriosis       Social History     Tobacco Use    Smoking status: Never Smoker    Smokeless tobacco: Never Used   Substance Use Topics    Alcohol use: Yes     Frequency: Monthly or less     Comment: socially       Social History     Social History Narrative     since 1981  Two sons  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, gardening, walking, needle work, painting, writing books          The following portions of the patient's history were reviewed and updated as appropriate: allergies, current medications, past family history, past medical history, past social history, past surgical history and problem list       Review of Systems       /78 (BP Location: Left arm, Patient Position: Sitting, Cuff Size: Adult)   Pulse (!) 53   Temp 98 °F (36 7 °C) (Tympanic)   Resp 18   Ht 5' 3 78" (1 62 m)   Wt 77 2 kg (170 lb 3 2 oz)   SpO2 98%   BMI 29 42 kg/m²      Physical Exam   Repeat blood pressure 120/80  Gait appears to be okay  There is no swelling noted of the right knee  She demonstrates good range of motion  There is mild tenderness in the area just anterior and superior to the anserine bursa  There is slight tenderness at the inferior medial aspect of the patella  No effusion is noted  Current Outpatient Medications:     Aluminum & Magnesium Hydroxide (MAGNESIUM-ALUMINUM PO), Take by mouth, Disp: , Rfl:     amLODIPine (NORVASC) 5 mg tablet, Take 5 mg by mouth daily Take half tablet daily, Disp: , Rfl: 3    loratadine (CLARITIN) 10 mg tablet, Take 10 mg by mouth, Disp: , Rfl:     metoprolol succinate (TOPROL-XL) 100 mg 24 hr tablet, Take 100 mg by mouth daily, Disp: , Rfl: 1    olmesartan (BENICAR) 40 mg tablet, Take 1 tablet (40 mg total) by mouth daily, Disp: 90 tablet, Rfl: 3    sertraline (ZOLOFT) 50 mg tablet, Take 1 tablet (50 mg total) by mouth daily, Disp: 90 tablet, Rfl: 3    diclofenac sodium (VOLTAREN) 1 %, Apply 2 g topically 4 (four) times a day, Disp: 2 Tube, Rfl: 3    meloxicam (MOBIC) 15 mg tablet, Take 1 tablet (15 mg total) by mouth daily, Disp: 90 tablet, Rfl: 3     No problem-specific Assessment & Plan notes found for this encounter  Diagnoses and all orders for this visit:    Anserine bursitis  -     meloxicam (MOBIC) 15 mg tablet; Take 1 tablet (15 mg total) by mouth daily  -     diclofenac sodium (VOLTAREN) 1 %;  Apply 2 g topically 4 (four) times a day        Patient Instructions   Suspect bursitis over the anserine bursa on the inside part of the leg just below the knee  Review of the x-ray does not show any arthritis  Switched to meloxicam 15 mg once daily for inflammation  In addition, may apply Voltaren gel up to 4 times a day to the affected area  Okay to use 2 extra-strength Tylenol 3 times a day as well  If not improving, could consider physical therapy

## 2019-11-15 NOTE — PATIENT INSTRUCTIONS
Suspect bursitis over the anserine bursa on the inside part of the leg just below the knee  Review of the x-ray does not show any arthritis  Switched to meloxicam 15 mg once daily for inflammation  In addition, may apply Voltaren gel up to 4 times a day to the affected area  Okay to use 2 extra-strength Tylenol 3 times a day as well  If not improving, could consider physical therapy

## 2020-01-30 ENCOUNTER — LAB (OUTPATIENT)
Dept: LAB | Age: 65
End: 2020-01-30
Payer: MEDICARE

## 2020-01-30 DIAGNOSIS — E87.1 HYPONATREMIA: ICD-10-CM

## 2020-01-30 LAB
ANION GAP SERPL CALCULATED.3IONS-SCNC: 3 MMOL/L (ref 4–13)
BUN SERPL-MCNC: 8 MG/DL (ref 5–25)
CALCIUM SERPL-MCNC: 9.7 MG/DL (ref 8.3–10.1)
CHLORIDE SERPL-SCNC: 99 MMOL/L (ref 100–108)
CO2 SERPL-SCNC: 30 MMOL/L (ref 21–32)
CREAT SERPL-MCNC: 0.7 MG/DL (ref 0.6–1.3)
GFR SERPL CREATININE-BSD FRML MDRD: 90 ML/MIN/1.73SQ M
GLUCOSE SERPL-MCNC: 94 MG/DL (ref 65–140)
POTASSIUM SERPL-SCNC: 4.6 MMOL/L (ref 3.5–5.3)
SODIUM SERPL-SCNC: 132 MMOL/L (ref 136–145)

## 2020-01-30 PROCEDURE — 80048 BASIC METABOLIC PNL TOTAL CA: CPT

## 2020-01-30 PROCEDURE — 36415 COLL VENOUS BLD VENIPUNCTURE: CPT

## 2020-02-04 ENCOUNTER — OFFICE VISIT (OUTPATIENT)
Dept: FAMILY MEDICINE CLINIC | Facility: CLINIC | Age: 65
End: 2020-02-04
Payer: MEDICARE

## 2020-02-04 VITALS
OXYGEN SATURATION: 96 % | BODY MASS INDEX: 29.06 KG/M2 | HEIGHT: 64 IN | TEMPERATURE: 97.2 F | WEIGHT: 170.2 LBS | SYSTOLIC BLOOD PRESSURE: 140 MMHG | DIASTOLIC BLOOD PRESSURE: 80 MMHG | HEART RATE: 56 BPM

## 2020-02-04 DIAGNOSIS — F32.5 MAJOR DEPRESSIVE DISORDER WITH SINGLE EPISODE, IN FULL REMISSION (HCC): ICD-10-CM

## 2020-02-04 DIAGNOSIS — I10 BENIGN ESSENTIAL HYPERTENSION: Primary | ICD-10-CM

## 2020-02-04 DIAGNOSIS — E87.1 HYPONATREMIA: ICD-10-CM

## 2020-02-04 DIAGNOSIS — L65.8 FEMALE PATTERN HAIR LOSS: ICD-10-CM

## 2020-02-04 DIAGNOSIS — R51.9 FREQUENT HEADACHES: ICD-10-CM

## 2020-02-04 PROBLEM — H65.21 RIGHT CHRONIC SEROUS OTITIS MEDIA: Status: RESOLVED | Noted: 2017-11-07 | Resolved: 2020-02-04

## 2020-02-04 PROCEDURE — 99214 OFFICE O/P EST MOD 30 MIN: CPT | Performed by: FAMILY MEDICINE

## 2020-02-04 PROCEDURE — 1160F RVW MEDS BY RX/DR IN RCRD: CPT | Performed by: FAMILY MEDICINE

## 2020-02-04 PROCEDURE — 4040F PNEUMOC VAC/ADMIN/RCVD: CPT | Performed by: FAMILY MEDICINE

## 2020-02-04 PROCEDURE — 3079F DIAST BP 80-89 MM HG: CPT | Performed by: FAMILY MEDICINE

## 2020-02-04 PROCEDURE — 1036F TOBACCO NON-USER: CPT | Performed by: FAMILY MEDICINE

## 2020-02-04 PROCEDURE — 3008F BODY MASS INDEX DOCD: CPT | Performed by: FAMILY MEDICINE

## 2020-02-04 PROCEDURE — 3077F SYST BP >= 140 MM HG: CPT | Performed by: FAMILY MEDICINE

## 2020-02-04 RX ORDER — SPIRONOLACTONE 50 MG/1
50 TABLET, FILM COATED ORAL DAILY
Qty: 90 TABLET | Refills: 5 | Status: SHIPPED | OUTPATIENT
Start: 2020-02-04 | End: 2021-05-07

## 2020-02-04 NOTE — PATIENT INSTRUCTIONS
Blood pressure is somewhat labile  Try to increase amlodipine to 5 mg alternating every other day with 2 5 mg to see if tolerated  If leg edema returns, cut back to 2 5  For both blood pressure and female pattern hair loss, begin spironolactone 50 mg daily  Also begin minoxidil 5% foam although may use a solution if she prefers  Continue sertraline which is controlling depression  Okay to use magnesium and glucosamine  Check blood pressure at home when feeling good  Fortunately, headaches have resolved  Recheck in 2 months

## 2020-02-04 NOTE — PROGRESS NOTES
Chief Complaint   Patient presents with    Follow-up     blood pressure medication        HPI   For follow-up of hypertension, hyponatremia, headaches, and depression  At last visit, hydrochlorothiazide was stopped because of low sodium and possible secondary headaches  Sodium was repeated and is up to 132  Headaches have resolved  Mood is okay  Blood pressure running about 291 systolic at home  Complains of itchy scalp and hair loss  Seen by dermatology about 6 months ago who did not have any solutions  Presently using baby shampoo  Did try Head and Shoulders  No scaling or rash in the scalp  Using over-the-counter magnesium 400 mg  Also using  Glucosamine 150 mg    blood work was done last July  TSH normal    Iron studies were also normal       Past Medical History:   Diagnosis Date    Anxiety state 12/19/2014    Benign essential hypertension 1/14/2010    Chronic rhinitis 1/12/2012    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 hair loss 2/4/2020    [facility], [facility] 10/10/2019    Mild persistent asthma without complication 5/8/8814    Right chronic serous otitis media 11/7/2017    Added automatically from request for surgery [id number]        Past Surgical History:   Procedure Laterality Date    LAPAROSCOPY      X2 for endometriosis       Social History     Tobacco Use    Smoking status: Never Smoker    Smokeless tobacco: Never Used   Substance Use Topics    Alcohol use: Yes     Frequency: Monthly or less     Comment: socially       Social History     Social History Narrative     since 1981  Two sons  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, gardening, walking, needle work, painting, writing books          The following portions of the patient's history were reviewed and updated as appropriate: allergies, current medications, past family history, past medical history, past social history, past surgical history and problem list       Review of Systems   Constitutional: Negative for activity change and appetite change  HENT: Negative for ear pain and hearing loss  Eyes: Negative for visual disturbance  Respiratory: Negative for shortness of breath and wheezing  Cardiovascular: Negative for chest pain and leg swelling  Gastrointestinal: Negative for abdominal pain, constipation and diarrhea  Genitourinary: Negative for difficulty urinating  Musculoskeletal: Negative for arthralgias and back pain  Skin: Negative for rash  Neurological: Negative for headaches  Psychiatric/Behavioral: Negative for dysphoric mood  The patient is not nervous/anxious  /80 (BP Location: Left arm, Patient Position: Sitting, Cuff Size: Adult)   Pulse 56   Temp (!) 97 2 °F (36 2 °C) (Tympanic)   Ht 5' 3 78" (1 62 m)   Wt 77 2 kg (170 lb 3 2 oz)   SpO2 96%   BMI 29 42 kg/m²      Physical Exam     Repeat blood pressure 180/100 in both arms  Appears well  Mood is upbeat  Affect appropriate  There is no scaling noted in the scalp  Hair is somewhat thinning  Both eardrums are white  Lungs are clear  Heart regular  No leg edema                Current Outpatient Medications:     Aluminum & Magnesium Hydroxide (MAGNESIUM-ALUMINUM PO), Take by mouth, Disp: , Rfl:     amLODIPine (NORVASC) 5 mg tablet, Take 5 mg by mouth daily Take half tablet daily, Disp: , Rfl: 3    diclofenac sodium (VOLTAREN) 1 %, Apply 2 g topically 4 (four) times a day, Disp: 2 Tube, Rfl: 3    loratadine (CLARITIN) 10 mg tablet, Take 10 mg by mouth, Disp: , Rfl:     meloxicam (MOBIC) 15 mg tablet, Take 1 tablet (15 mg total) by mouth daily, Disp: 90 tablet, Rfl: 3    metoprolol succinate (TOPROL-XL) 100 mg 24 hr tablet, Take 100 mg by mouth daily, Disp: , Rfl: 1    olmesartan (BENICAR) 40 mg tablet, Take 1 tablet (40 mg total) by mouth daily, Disp: 90 tablet, Rfl: 3    sertraline (ZOLOFT) 50 mg tablet, Take 1 tablet (50 mg total) by mouth daily, Disp: 90 tablet, Rfl: 3    Minoxidil 5 % FOAM, Applied daily to scalp, Disp: , Rfl: 0    spironolactone (ALDACTONE) 50 mg tablet, Take 1 tablet (50 mg total) by mouth daily, Disp: 90 tablet, Rfl: 5     No problem-specific Assessment & Plan notes found for this encounter  Diagnoses and all orders for this visit:    Benign essential hypertension  -     spironolactone (ALDACTONE) 50 mg tablet; Take 1 tablet (50 mg total) by mouth daily    Major depressive disorder with single episode, in full remission [facility])    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 hair loss  -     Minoxidil 5 % FOAM; Applied daily to scalp    Hyponatremia    Frequent headaches         Patient Instructions    Blood pressure is somewhat labile  Try to increase amlodipine to 5 mg alternating every other day with 2 5 mg to see if tolerated  If leg edema returns, cut back to 2 5  For both blood pressure and female pattern hair loss, begin spironolactone 50 mg daily  Also begin minoxidil 5% foam although may use a solution if she prefers  Continue sertraline which is controlling depression  Okay to use magnesium and glucosamine  Check blood pressure at home when feeling good  Fortunately, headaches have resolved  Recheck in 2 months

## 2020-02-11 DIAGNOSIS — I10 UNCONTROLLED HYPERTENSION: Primary | ICD-10-CM

## 2020-02-11 DIAGNOSIS — F32.5 MAJOR DEPRESSIVE DISORDER WITH SINGLE EPISODE, IN FULL REMISSION (HCC): ICD-10-CM

## 2020-02-11 RX ORDER — METOPROLOL SUCCINATE 100 MG/1
100 TABLET, EXTENDED RELEASE ORAL DAILY
Qty: 90 TABLET | Refills: 3 | Status: SHIPPED | OUTPATIENT
Start: 2020-02-11 | End: 2021-02-01

## 2020-04-14 ENCOUNTER — OFFICE VISIT (OUTPATIENT)
Dept: FAMILY MEDICINE CLINIC | Facility: CLINIC | Age: 65
End: 2020-04-14
Payer: MEDICARE

## 2020-04-14 VITALS
SYSTOLIC BLOOD PRESSURE: 137 MMHG | HEART RATE: 66 BPM | TEMPERATURE: 97.9 F | OXYGEN SATURATION: 97 % | HEIGHT: 64 IN | WEIGHT: 172.2 LBS | DIASTOLIC BLOOD PRESSURE: 81 MMHG | BODY MASS INDEX: 29.4 KG/M2

## 2020-04-14 DIAGNOSIS — I10 BENIGN ESSENTIAL HYPERTENSION: Primary | ICD-10-CM

## 2020-04-14 DIAGNOSIS — J45.30 MILD PERSISTENT ASTHMA WITHOUT COMPLICATION: ICD-10-CM

## 2020-04-14 DIAGNOSIS — F32.5 MAJOR DEPRESSIVE DISORDER WITH SINGLE EPISODE, IN FULL REMISSION (HCC): ICD-10-CM

## 2020-04-14 DIAGNOSIS — L65.8 FEMALE PATTERN HAIR LOSS: ICD-10-CM

## 2020-04-14 PROCEDURE — 99214 OFFICE O/P EST MOD 30 MIN: CPT | Performed by: FAMILY MEDICINE

## 2020-04-14 PROCEDURE — 1036F TOBACCO NON-USER: CPT | Performed by: FAMILY MEDICINE

## 2020-04-14 PROCEDURE — 3008F BODY MASS INDEX DOCD: CPT | Performed by: FAMILY MEDICINE

## 2020-04-14 PROCEDURE — 3075F SYST BP GE 130 - 139MM HG: CPT | Performed by: FAMILY MEDICINE

## 2020-04-14 PROCEDURE — 1160F RVW MEDS BY RX/DR IN RCRD: CPT | Performed by: FAMILY MEDICINE

## 2020-04-14 PROCEDURE — 4040F PNEUMOC VAC/ADMIN/RCVD: CPT | Performed by: FAMILY MEDICINE

## 2020-04-14 PROCEDURE — 3079F DIAST BP 80-89 MM HG: CPT | Performed by: FAMILY MEDICINE

## 2020-04-14 RX ORDER — AMLODIPINE BESYLATE 5 MG/1
TABLET ORAL
Refills: 3
Start: 2020-04-14 | End: 2020-05-15 | Stop reason: SDUPTHER

## 2020-04-14 RX ORDER — CHLORAL HYDRATE 500 MG
CAPSULE ORAL
COMMUNITY

## 2020-05-15 DIAGNOSIS — I10 BENIGN ESSENTIAL HYPERTENSION: ICD-10-CM

## 2020-05-15 RX ORDER — AMLODIPINE BESYLATE 5 MG/1
TABLET ORAL
Qty: 90 TABLET | Refills: 3 | Status: SHIPPED | OUTPATIENT
Start: 2020-05-15 | End: 2021-08-09

## 2020-07-27 ENCOUNTER — TELEMEDICINE (OUTPATIENT)
Dept: FAMILY MEDICINE CLINIC | Facility: CLINIC | Age: 65
End: 2020-07-27
Payer: MEDICARE

## 2020-07-27 ENCOUNTER — APPOINTMENT (OUTPATIENT)
Dept: LAB | Facility: MEDICAL CENTER | Age: 65
End: 2020-07-27
Payer: MEDICARE

## 2020-07-27 DIAGNOSIS — R51.9 NONINTRACTABLE EPISODIC HEADACHE, UNSPECIFIED HEADACHE TYPE: ICD-10-CM

## 2020-07-27 DIAGNOSIS — Z20.828 EXPOSURE TO SARS-ASSOCIATED CORONAVIRUS: ICD-10-CM

## 2020-07-27 DIAGNOSIS — R51.9 NONINTRACTABLE EPISODIC HEADACHE, UNSPECIFIED HEADACHE TYPE: Primary | ICD-10-CM

## 2020-07-27 LAB
CRP SERPL QL: 10.6 MG/L
ERYTHROCYTE [SEDIMENTATION RATE] IN BLOOD: 28 MM/HOUR (ref 0–20)

## 2020-07-27 PROCEDURE — 3075F SYST BP GE 130 - 139MM HG: CPT | Performed by: FAMILY MEDICINE

## 2020-07-27 PROCEDURE — 1036F TOBACCO NON-USER: CPT | Performed by: FAMILY MEDICINE

## 2020-07-27 PROCEDURE — 36415 COLL VENOUS BLD VENIPUNCTURE: CPT

## 2020-07-27 PROCEDURE — 99214 OFFICE O/P EST MOD 30 MIN: CPT | Performed by: FAMILY MEDICINE

## 2020-07-27 PROCEDURE — 3079F DIAST BP 80-89 MM HG: CPT | Performed by: FAMILY MEDICINE

## 2020-07-27 PROCEDURE — 4040F PNEUMOC VAC/ADMIN/RCVD: CPT | Performed by: FAMILY MEDICINE

## 2020-07-27 PROCEDURE — 85652 RBC SED RATE AUTOMATED: CPT

## 2020-07-27 PROCEDURE — 86140 C-REACTIVE PROTEIN: CPT

## 2020-07-27 PROCEDURE — U0003 INFECTIOUS AGENT DETECTION BY NUCLEIC ACID (DNA OR RNA); SEVERE ACUTE RESPIRATORY SYNDROME CORONAVIRUS 2 (SARS-COV-2) (CORONAVIRUS DISEASE [COVID-19]), AMPLIFIED PROBE TECHNIQUE, MAKING USE OF HIGH THROUGHPUT TECHNOLOGIES AS DESCRIBED BY CMS-2020-01-R: HCPCS

## 2020-07-27 NOTE — PATIENT INSTRUCTIONS
Symptoms of headache with nausea and chill could be an early sign of Covid  Also want to rule out temporal arteritis as she has a left-sided temporal headache  Obtain Covid swab  Also obtain sed rate and C-reactive protein  Suggest 2 extra-strength Tylenol 3 times a day  May also take 2 Advil 3 times a day  Will call with results

## 2020-07-27 NOTE — PROGRESS NOTES
Virtual Regular Visit      Assessment/Plan:    Problem List Items Addressed This Visit     None      Visit Diagnoses     Nonintractable episodic headache, unspecified headache type    -  Primary    Relevant Orders    Sedimentation rate, automated    C-reactive protein    Exposure to SARS-associated coronavirus        Relevant Orders    MISC COVID-19 TEST- Collected at [facility] or [facility]        Patient Instructions   Symptoms of headache with nausea and chill could be an early sign of Covid  Also want to rule out temporal arteritis as she has a left-sided temporal headache  Obtain Covid swab  Also obtain sed rate and C-reactive protein  Suggest 2 extra-strength Tylenol 3 times a day  May also take 2 Advil 3 times a day  Will call with results  Reason for visit is   Chief Complaint   Patient presents with    Virtual Regular Visit        Encounter provider Max Beckford MD    Provider located at [address] [place] [address]  [address] [facility] [id number]      Recent Visits  No visits were found meeting these conditions  Showing recent visits within past 7 days and meeting all other requirements     Future Appointments  No visits were found meeting these conditions  Showing future appointments within next 150 days and meeting all other requirements        The patient was identified by name and date of birth  Oswald Velasquez was informed that this is a telemedicine visit and that the visit is being conducted through [address] [place] [address] and patient was informed that this is not a secure, HIPAA-complaint platform  She agrees to proceed     My office door was closed  No one else was in the room  She acknowledged consent and understanding of privacy and security of the video platform  The patient has agreed to participate and understands they can discontinue the visit at any time  Patient is aware this is a billable service       Sujatha Jackson A Ron is a 77 y o  female who has had a headache for 4 days  Headache is on the left side of her head  Two nights ago, had chills  Prior to that, had an episode of vomiting  Has her usual cough  No shortness of breath  No fever  Get some relief with Tylenol  However, still gets a small ache in the back of her head  Pain is mostly in her temple  Pain is described as an 8 on a scale of 1-10  Was in [place] on the day this started  Wore masks  Also has a pain in her L jaw  Dentist took Xrays     Mentioned TMJ  Symptoms started about 1 month ago  Have not been continuous  She had a migraine in October  One previous 1 before that  This headache seems different         Past Medical History:   Diagnosis Date    Anxiety state 12/19/2014    Benign essential hypertension 1/14/2010    Chronic rhinitis 1/12/2012    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 hair loss 2/4/2020    Major depressive disorder with single episode, in full remission ([facility] ) 10/10/2019    Mild persistent asthma without complication 5/0/1760    Right chronic serous otitis media 11/7/2017    Added automatically from request for surgery [id number]       Past Surgical History:   Procedure Laterality Date    LAPAROSCOPY      X2 for endometriosis       Current Outpatient Medications   Medication Sig Dispense Refill    Aluminum & Magnesium Hydroxide (MAGNESIUM-ALUMINUM PO) Take by mouth      amLODIPine (NORVASC) 5 mg tablet One tablet MWF, 1/2 tablet other days 90 tablet 3    loratadine (CLARITIN) 10 mg tablet Take 10 mg by mouth      meloxicam (MOBIC) 15 mg tablet Take 1 tablet (15 mg total) by mouth daily 90 tablet 3    metoprolol succinate (TOPROL-XL) 100 mg 24 hr tablet Take 1 tablet (100 mg total) by mouth daily 90 tablet 3    olmesartan (BENICAR) 40 mg tablet Take 1 tablet (40 mg total) by mouth daily 90 tablet 3    Omega-3 1000 MG CAPS Take by mouth      sertraline (ZOLOFT) 50 mg tablet Take 1 tablet (50 mg total) by mouth daily 90 tablet 3    spironolactone (ALDACTONE) 50 mg tablet Take 1 tablet (50 mg total) by mouth daily 90 tablet 5     No current facility-administered medications for this visit  No Known Allergies    Review of Systems   Constitutional: Negative for fatigue  Respiratory: Negative for cough and shortness of breath  Gastrointestinal: Positive for nausea  Neurological: Positive for headaches  Video Exam    There were no vitals filed for this visit  Physical Exam   She appears well  Interacts appropriately  Extraocular motions appear intact  I spent 16 minutes directly with the patient during this visit      [address] Velasquez acknowledges that she has consented to an online visit or consultation  She understands that the online visit is based solely on information provided by her, and that, in the absence of a face-to-face physical evaluation by the physician, the diagnosis she receives is both limited and provisional in terms of accuracy and completeness  This is not intended to replace a full medical face-to-face evaluation by the physician  Francine Velasquez understands and accepts these terms

## 2020-07-28 ENCOUNTER — TELEPHONE (OUTPATIENT)
Dept: FAMILY MEDICINE CLINIC | Facility: CLINIC | Age: 65
End: 2020-07-28

## 2020-07-28 LAB — SARS-COV-2 RNA SPEC QL NAA+PROBE: NOT DETECTED

## 2020-07-28 NOTE — TELEPHONE ENCOUNTER
Spoke to Julio about her results  Covid is negative  Sed rate only 28 making it unlikely she has temporal arteritis  Although C-reactive protein was elevated at 10 6  She states her headache today is just 0-1  Suspect she had a migraine headache which lasted for 4 days and is about over

## 2020-08-06 ENCOUNTER — OFFICE VISIT (OUTPATIENT)
Dept: FAMILY MEDICINE CLINIC | Facility: CLINIC | Age: 65
End: 2020-08-06
Payer: MEDICARE

## 2020-08-06 VITALS
OXYGEN SATURATION: 96 % | HEIGHT: 64 IN | DIASTOLIC BLOOD PRESSURE: 70 MMHG | SYSTOLIC BLOOD PRESSURE: 144 MMHG | TEMPERATURE: 97.2 F | WEIGHT: 174 LBS | BODY MASS INDEX: 29.71 KG/M2 | HEART RATE: 64 BPM

## 2020-08-06 DIAGNOSIS — R51.9 LEFT TEMPORAL HEADACHE: Primary | ICD-10-CM

## 2020-08-06 DIAGNOSIS — R53.83 FATIGUE, UNSPECIFIED TYPE: ICD-10-CM

## 2020-08-06 PROCEDURE — 4040F PNEUMOC VAC/ADMIN/RCVD: CPT | Performed by: FAMILY MEDICINE

## 2020-08-06 PROCEDURE — 99214 OFFICE O/P EST MOD 30 MIN: CPT | Performed by: FAMILY MEDICINE

## 2020-08-06 PROCEDURE — 3078F DIAST BP <80 MM HG: CPT | Performed by: FAMILY MEDICINE

## 2020-08-06 PROCEDURE — 3077F SYST BP >= 140 MM HG: CPT | Performed by: FAMILY MEDICINE

## 2020-08-06 PROCEDURE — 1160F RVW MEDS BY RX/DR IN RCRD: CPT | Performed by: FAMILY MEDICINE

## 2020-08-06 PROCEDURE — 3008F BODY MASS INDEX DOCD: CPT | Performed by: FAMILY MEDICINE

## 2020-08-06 PROCEDURE — 1036F TOBACCO NON-USER: CPT | Performed by: FAMILY MEDICINE

## 2020-08-06 RX ORDER — PREDNISONE 20 MG/1
TABLET ORAL
Qty: 90 TABLET | Refills: 0 | Status: SHIPPED | OUTPATIENT
Start: 2020-08-06 | End: 2020-08-21 | Stop reason: SDUPTHER

## 2020-08-06 NOTE — PROGRESS NOTES
Chief Complaint   Patient presents with    Headache     chiragn states that about 10 days ago she started with the headache and now she is vomiting for about a week        HPI   Here with continuation of headaches  First noted headaches about July 23rd  Telemedicine visit done 4 days later  Headaches noted to be on the left side  Sometimes associated with vomiting  She had a Covid test which was negative  She had a sed rate done which was 28 and a CRP which was elevated at 10 6  Headaches have improved into the last couple of days when the intensity ranged from a 2-3 to a 4-5  Yet today, vomited twice  Yesterday was riding her bike and had to pull over with nausea but did not have to vomit  Blood pressure is well controlled at home  Systolic 893 last week  Headache is continuous  Some relief with Tylenol but the headache recurs  The location of the headache is across her brow radiating along her left temple above her left ear and down to her left occipital area and neck  Headache always on the left side  Right side is not affected  No visual changes  No appetite changes or weight change  Occasional sharp pain in the TMJ area that radiates to the back of her head  Denies jaw claudication      Past Medical History:   Diagnosis Date    Anxiety state 12/19/2014    Benign essential hypertension 1/14/2010    Chronic rhinitis 1/12/2012    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 hair loss 2/4/2020    Major depressive disorder with single episode, in full remission ([facility] ) 10/10/2019    Mild persistent asthma without complication 9/0/8746    Right chronic serous otitis media 11/7/2017    Added automatically from request for surgery [id number]        Past Surgical History:   Procedure Laterality Date    LAPAROSCOPY      X2 for endometriosis    MA TEMPORAL ARTERY LIGATN OR BX Left 8/14/2020    Procedure: TEMPORAL ARTERY BIOPSY;  Surgeon: Dean Cooley MD;  Location: [address] [place] [address];  Service: General       Social History Tobacco Use    Smoking status: Never Smoker    Smokeless tobacco: Never Used   Substance Use Topics    Alcohol use: Yes     Frequency: Monthly or less     Comment: socially       Social History     Social History Narrative     since 1981  Two sons  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, gardening, walking, needle work, painting, writing books  The following portions of the patient's history were reviewed and updated as appropriate: allergies, current medications, past family history, past medical history, past social history, past surgical history and problem list       Review of Systems   Constitutional: Positive for fatigue  Negative for activity change and appetite change  HENT: Negative for ear pain and hearing loss  Eyes: Negative for visual disturbance  Respiratory: Negative for shortness of breath and wheezing  Cardiovascular: Negative for chest pain and leg swelling  Gastrointestinal: Negative for abdominal pain, constipation and diarrhea  Genitourinary: Negative for difficulty urinating  Musculoskeletal: Negative for arthralgias and back pain  Skin: Negative for rash  Neurological: Negative for headaches  Psychiatric/Behavioral: Negative for dysphoric mood  The patient is not nervous/anxious  /70 (BP Location: Left arm, Patient Position: Sitting, Cuff Size: Adult)   Pulse 64   Temp (!) 97 2 °F (36 2 °C) (Tympanic)   Ht 5' 3 78" (1 62 m)   Wt 78 9 kg (174 lb)   SpO2 96%   BMI 30 07 kg/m²      Physical Exam     Pleasant female in no acute distress  Extraocular motions are intact  Visual fields are intact  Fund are visualized  Good venous pulsations  Cup disc ratio appears to be normal   No hemorrhages or exudates noted  The right ear drum is blocked by wax  The left eardrum is visualized in his white  Pupils are equal and reactive to light      There is tenderness in the temporal area  Some tenderness in the left forehead  Lungs are clear  Heart regular  Neurologic grossly within normal limits  Current Outpatient Medications:     Aluminum & Magnesium Hydroxide (MAGNESIUM-ALUMINUM PO), Take by mouth, Disp: , Rfl:     amLODIPine (NORVASC) 5 mg tablet, One tablet MWF, 1/2 tablet other days, Disp: 90 tablet, Rfl: 3    loratadine (CLARITIN) 10 mg tablet, Take 10 mg by mouth, Disp: , Rfl:     meloxicam (MOBIC) 15 mg tablet, Take 1 tablet (15 mg total) by mouth daily, Disp: 90 tablet, Rfl: 3    metoprolol succinate (TOPROL-XL) 100 mg 24 hr tablet, Take 1 tablet (100 mg total) by mouth daily, Disp: 90 tablet, Rfl: 3    olmesartan (BENICAR) 40 mg tablet, Take 1 tablet (40 mg total) by mouth daily, Disp: 90 tablet, Rfl: 3    Omega-3 1000 MG CAPS, Take by mouth, Disp: , Rfl:     sertraline (ZOLOFT) 50 mg tablet, Take 1 tablet (50 mg total) by mouth daily, Disp: 90 tablet, Rfl: 3    spironolactone (ALDACTONE) 50 mg tablet, Take 1 tablet (50 mg total) by mouth daily, Disp: 90 tablet, Rfl: 5    predniSONE 20 mg tablet, 2 TABLETS DAILY OR AS DIRECTED, Disp: 90 tablet, Rfl: 5     No problem-specific Assessment & Plan notes found for this encounter  Diagnoses and all orders for this visit:    Left temporal headache  -     Sedimentation rate, automated; Future  -     C-reactive protein; Future  -     Discontinue: predniSONE 20 mg tablet; Three tablets daily or as directed  -     Ambulatory referral to General Surgery; Future    Fatigue, unspecified type  -     Comprehensive metabolic panel; Future  -     CBC; Future  -     TSH, 3rd generation with Free T4 reflex; Future        Patient Instructions   Concerned once again about temporal arteritis  Recheck sed rate and C reactive protein  Also check blood count, chemistry, and thyroid because of her fatigue  Empiric treatment with prednisone 60 mg daily   Consider temporal artery biopsy pending her improvement and blood work  Avoid the use of meloxicam while on the prednisone  Recheck in 1 week  Addendum:  I advised patient on 8/7 that I would like her to have a temporal artery biopsy  Will proceed with arrangements to have that done as did not want to commit her to long-term prednisone without having a tissue diagnosis  She was advised that being on the prednisone 60 mg daily should protect her from sudden loss of vision  Temporal Arteritis   WHAT YOU NEED TO KNOW:   Temporal arteritis (giant cell arteritis or cranial arteritis) is an inflammation of the lining of your arteries  It most often affects the temporal arteries  Temporal arteries are blood vessels that are located near your temples  Your arteries may become swollen, narrow, and tender  Over time, the swollen and narrowed temporal arteries cause decreased blood flow to the eyes, face, and brain  The lack of oxygen may result in other serious conditions, such as a stroke, heart attack, or blindness  Temporal arteritis may become life-threatening  DISCHARGE INSTRUCTIONS:   Medicines:   · Medicines , such as steroids, will be given to decrease inflammation  Medicines may also be given to help your immune system  · Antiplatelets , such as aspirin, help prevent blood clots  Take your antiplatelet medicine exactly as directed  These medicines make it more likely for you to bleed or bruise  If you are told to take aspirin, do not take acetaminophen or ibuprofen instead  · Vitamin D and calcium  may be given while you are using steroid medicines  These supplements help prevent bone loss  · Take your medicine as directed  Contact your healthcare provider if you think your medicine is not helping or if you have side effects  Tell him or her if you are allergic to any medicine  Keep a list of the medicines, vitamins, and herbs you take  Include the amounts, and when and why you take them   Bring the list or the pill bottles to follow-up visits  Carry your medicine list with you in case of an emergency  Follow up with your healthcare provider as directed:  Write down your questions so you remember to ask them during your visits  Self-care:   · Limit alcohol  Women should limit alcohol to 1 drink a day  Men should limit alcohol to 2 drinks a day  A drink of alcohol is 12 ounces of beer, 5 ounces of wine, or 1½ ounces of liquor  · Exercise  Ask your healthcare provider about the best exercise plan for you  Exercise can help build and strengthen bone  · Do not smoke  If you smoke, it is never too late to quit  Ask for information if you need help quitting  Contact your healthcare provider if:   · You have a fever  · You have chills, a cough, or feel weak and achy  · Your skin is itchy, swollen, or has a rash  · You have questions or concerns about your condition or care  Return to the emergency department if:   · You have any of the following signs of a heart attack:      ¨ Squeezing, pressure, or pain in your chest that lasts longer than 5 minutes or returns    ¨ Discomfort or pain in your back, neck, jaw, stomach, or arm     ¨ Trouble breathing    ¨ Nausea or vomiting    ¨ Lightheadedness or a sudden cold sweat, especially with chest pain or trouble breathing    · You have any of the following signs of a stroke:      ¨ Numbness or drooping on one side of your face     ¨ Weakness in an arm or leg    ¨ Confusion or difficulty speaking    ¨ Dizziness, a severe headache, or vision loss    · You have sudden vision loss in one or both eyes  · Your signs and symptoms come back or get worse  © 2017 [facility] Information is for End User's use only and may not be sold, redistributed or otherwise used for commercial purposes  All illustrations and images included in CareNotes® are the copyrighted property of [employer]  or [employer]  The above information is an  only   It is not intended as medical advice for individual conditions or treatments  Talk to your doctor, nurse or pharmacist before following any medical regimen to see if it is safe and effective for you

## 2020-08-06 NOTE — PATIENT INSTRUCTIONS
Concerned once again about temporal arteritis  Recheck sed rate and C reactive protein  Also check blood count, chemistry, and thyroid because of her fatigue  Empiric treatment with prednisone 60 mg daily  Consider temporal artery biopsy pending her improvement and blood work  Avoid the use of meloxicam while on the prednisone  Recheck in 1 week  Addendum:  I advised patient on 8/7 that I would like her to have a temporal artery biopsy  Will proceed with arrangements to have that done as did not want to commit her to long-term prednisone without having a tissue diagnosis  She was advised that being on the prednisone 60 mg daily should protect her from sudden loss of vision  Temporal Arteritis   WHAT YOU NEED TO KNOW:   Temporal arteritis (giant cell arteritis or cranial arteritis) is an inflammation of the lining of your arteries  It most often affects the temporal arteries  Temporal arteries are blood vessels that are located near your temples  Your arteries may become swollen, narrow, and tender  Over time, the swollen and narrowed temporal arteries cause decreased blood flow to the eyes, face, and brain  The lack of oxygen may result in other serious conditions, such as a stroke, heart attack, or blindness  Temporal arteritis may become life-threatening  DISCHARGE INSTRUCTIONS:   Medicines:   · Medicines , such as steroids, will be given to decrease inflammation  Medicines may also be given to help your immune system  · Antiplatelets , such as aspirin, help prevent blood clots  Take your antiplatelet medicine exactly as directed  These medicines make it more likely for you to bleed or bruise  If you are told to take aspirin, do not take acetaminophen or ibuprofen instead  · Vitamin D and calcium  may be given while you are using steroid medicines  These supplements help prevent bone loss  · Take your medicine as directed    Contact your healthcare provider if you think your medicine is not helping or if you have side effects  Tell him or her if you are allergic to any medicine  Keep a list of the medicines, vitamins, and herbs you take  Include the amounts, and when and why you take them  Bring the list or the pill bottles to follow-up visits  Carry your medicine list with you in case of an emergency  Follow up with your healthcare provider as directed:  Write down your questions so you remember to ask them during your visits  Self-care:   · Limit alcohol  Women should limit alcohol to 1 drink a day  Men should limit alcohol to 2 drinks a day  A drink of alcohol is 12 ounces of beer, 5 ounces of wine, or 1½ ounces of liquor  · Exercise  Ask your healthcare provider about the best exercise plan for you  Exercise can help build and strengthen bone  · Do not smoke  If you smoke, it is never too late to quit  Ask for information if you need help quitting  Contact your healthcare provider if:   · You have a fever  · You have chills, a cough, or feel weak and achy  · Your skin is itchy, swollen, or has a rash  · You have questions or concerns about your condition or care  Return to the emergency department if:   · You have any of the following signs of a heart attack:      ¨ Squeezing, pressure, or pain in your chest that lasts longer than 5 minutes or returns    ¨ Discomfort or pain in your back, neck, jaw, stomach, or arm     ¨ Trouble breathing    ¨ Nausea or vomiting    ¨ Lightheadedness or a sudden cold sweat, especially with chest pain or trouble breathing    · You have any of the following signs of a stroke:      ¨ Numbness or drooping on one side of your face     ¨ Weakness in an arm or leg    ¨ Confusion or difficulty speaking    ¨ Dizziness, a severe headache, or vision loss    · You have sudden vision loss in one or both eyes  · Your signs and symptoms come back or get worse    © 2017 [employer] Information is for End User's use only and may not be sold, redistributed or otherwise used for commercial purposes  All illustrations and images included in CareNotes® are the copyrighted property of [employer]  or [employer]  The above information is an  only  It is not intended as medical advice for individual conditions or treatments  Talk to your doctor, nurse or pharmacist before following any medical regimen to see if it is safe and effective for you

## 2020-08-07 ENCOUNTER — LAB (OUTPATIENT)
Dept: LAB | Age: 65
End: 2020-08-07
Payer: MEDICARE

## 2020-08-07 DIAGNOSIS — R53.83 FATIGUE, UNSPECIFIED TYPE: ICD-10-CM

## 2020-08-07 DIAGNOSIS — R51.9 LEFT TEMPORAL HEADACHE: ICD-10-CM

## 2020-08-07 LAB
ALBUMIN SERPL BCP-MCNC: 4 G/DL (ref 3.5–5)
ALP SERPL-CCNC: 52 U/L (ref 46–116)
ALT SERPL W P-5'-P-CCNC: 25 U/L (ref 12–78)
ANION GAP SERPL CALCULATED.3IONS-SCNC: 7 MMOL/L (ref 4–13)
AST SERPL W P-5'-P-CCNC: 10 U/L (ref 5–45)
BILIRUB SERPL-MCNC: 0.47 MG/DL (ref 0.2–1)
BUN SERPL-MCNC: 12 MG/DL (ref 5–25)
CALCIUM SERPL-MCNC: 9.7 MG/DL (ref 8.3–10.1)
CHLORIDE SERPL-SCNC: 95 MMOL/L (ref 100–108)
CO2 SERPL-SCNC: 27 MMOL/L (ref 21–32)
CREAT SERPL-MCNC: 0.8 MG/DL (ref 0.6–1.3)
CRP SERPL QL: 11.1 MG/L
ERYTHROCYTE [DISTWIDTH] IN BLOOD BY AUTOMATED COUNT: 13.1 % (ref 11.6–15.1)
ERYTHROCYTE [SEDIMENTATION RATE] IN BLOOD: 30 MM/HOUR (ref 0–29)
GFR SERPL CREATININE-BSD FRML MDRD: 77 ML/MIN/1.73SQ M
GLUCOSE SERPL-MCNC: 107 MG/DL (ref 65–140)
HCT VFR BLD AUTO: 39.1 % (ref 34.8–46.1)
HGB BLD-MCNC: 13 G/DL (ref 11.5–15.4)
MCH RBC QN AUTO: 32.5 PG (ref 26.8–34.3)
MCHC RBC AUTO-ENTMCNC: 33.2 G/DL (ref 31.4–37.4)
MCV RBC AUTO: 98 FL (ref 82–98)
PLATELET # BLD AUTO: 353 THOUSANDS/UL (ref 149–390)
PMV BLD AUTO: 9.2 FL (ref 8.9–12.7)
POTASSIUM SERPL-SCNC: 5.2 MMOL/L (ref 3.5–5.3)
PROT SERPL-MCNC: 7.8 G/DL (ref 6.4–8.2)
RBC # BLD AUTO: 4 MILLION/UL (ref 3.81–5.12)
SODIUM SERPL-SCNC: 129 MMOL/L (ref 136–145)
WBC # BLD AUTO: 6.72 THOUSAND/UL (ref 4.31–10.16)

## 2020-08-07 PROCEDURE — 85652 RBC SED RATE AUTOMATED: CPT

## 2020-08-07 PROCEDURE — 86140 C-REACTIVE PROTEIN: CPT

## 2020-08-07 PROCEDURE — 85027 COMPLETE CBC AUTOMATED: CPT

## 2020-08-07 PROCEDURE — 36415 COLL VENOUS BLD VENIPUNCTURE: CPT

## 2020-08-07 PROCEDURE — 80053 COMPREHEN METABOLIC PANEL: CPT

## 2020-08-11 ENCOUNTER — CONSULT (OUTPATIENT)
Dept: SURGERY | Facility: CLINIC | Age: 65
End: 2020-08-11
Payer: MEDICARE

## 2020-08-11 VITALS
TEMPERATURE: 96.9 F | WEIGHT: 177 LBS | HEIGHT: 64 IN | DIASTOLIC BLOOD PRESSURE: 90 MMHG | HEART RATE: 63 BPM | BODY MASS INDEX: 30.22 KG/M2 | SYSTOLIC BLOOD PRESSURE: 160 MMHG

## 2020-08-11 DIAGNOSIS — M31.6 TEMPORAL ARTERITIS (HCC): Primary | ICD-10-CM

## 2020-08-11 DIAGNOSIS — R51.9 LEFT TEMPORAL HEADACHE: ICD-10-CM

## 2020-08-11 PROCEDURE — 1160F RVW MEDS BY RX/DR IN RCRD: CPT | Performed by: SPECIALIST

## 2020-08-11 PROCEDURE — 1036F TOBACCO NON-USER: CPT | Performed by: SPECIALIST

## 2020-08-11 PROCEDURE — 4040F PNEUMOC VAC/ADMIN/RCVD: CPT | Performed by: SPECIALIST

## 2020-08-11 PROCEDURE — 3080F DIAST BP >= 90 MM HG: CPT | Performed by: SPECIALIST

## 2020-08-11 PROCEDURE — 99204 OFFICE O/P NEW MOD 45 MIN: CPT | Performed by: SPECIALIST

## 2020-08-11 PROCEDURE — 3008F BODY MASS INDEX DOCD: CPT | Performed by: SPECIALIST

## 2020-08-11 PROCEDURE — 3077F SYST BP >= 140 MM HG: CPT | Performed by: SPECIALIST

## 2020-08-11 NOTE — H&P
Chief Complaint:  Left temporal headache    History of Present Illness:   Patient is a 73-year-old white female presents to the office today from her family [de-identified] office in regards to persistent left-sided temporal headaches  She has had recurring headaches for the past 2-3 weeks  Primarily on the left side  It was associated with vomiting sometimes  The possibility of temporal arteritis was considered by her family physician and she was placed on steroids  At that time she was referred office for evaluation for left-sided temporal artery biopsy  Patient denies any significant ocular changes are involvement  She has felt better on the prednisone      Past Medical History:   Past Medical History:   Diagnosis Date    Anxiety state 12/19/2014    Benign essential hypertension 1/14/2010    Chronic rhinitis 1/12/2012    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 hair loss 2/4/2020    Major depressive disorder with single episode, in full remission ([facility] ) 10/10/2019    Mild persistent asthma without complication 8/9/2588    Right chronic serous otitis media 11/7/2017    Added automatically from request for surgery [id number]         Past Surgical History:    Past Surgical History:   Procedure Laterality Date    LAPAROSCOPY      X2 for endometriosis         Allergies:  No Known Allergies      Medications:    Current Outpatient Medications:     Aluminum & Magnesium Hydroxide (MAGNESIUM-ALUMINUM PO), Take by mouth, Disp: , Rfl:     amLODIPine (NORVASC) 5 mg tablet, One tablet MWF, 1/2 tablet other days, Disp: 90 tablet, Rfl: 3    loratadine (CLARITIN) 10 mg tablet, Take 10 mg by mouth, Disp: , Rfl:     meloxicam (MOBIC) 15 mg tablet, Take 1 tablet (15 mg total) by mouth daily, Disp: 90 tablet, Rfl: 3    metoprolol succinate (TOPROL-XL) 100 mg 24 hr tablet, Take 1 tablet (100 mg total) by mouth daily, Disp: 90 tablet, Rfl: 3    olmesartan (BENICAR) 40 mg tablet, Take 1 tablet (40 mg total) by mouth daily, Disp: 90 tablet, Rfl: 3   Omega-3 1000 MG CAPS, Take by mouth, Disp: , Rfl:     predniSONE 20 mg tablet, Three tablets daily or as directed, Disp: 90 tablet, Rfl: 0    sertraline (ZOLOFT) 50 mg tablet, Take 1 tablet (50 mg total) by mouth daily, Disp: 90 tablet, Rfl: 3    spironolactone (ALDACTONE) 50 mg tablet, Take 1 tablet (50 mg total) by mouth daily, Disp: 90 tablet, Rfl: 5      Social History:  Social History     Social History     Substance and Sexual Activity   Alcohol Use Yes    Frequency: Monthly or less    Comment: socially     Social History     Substance and Sexual Activity   Drug Use Never     Social History     Tobacco Use   Smoking Status Never Smoker   Smokeless Tobacco Never Used         Family History:    Family History   Problem Relation Age of Onset    Coronary artery disease Mother     Coronary artery disease Father     No Known Problems Maternal Grandmother     No Known Problems Maternal Grandfather     Leukemia Paternal Grandmother 71    No Known Problems Paternal Grandfather     No Known Problems Son     No Known Problems Son          Review of Systems:    As per the HPI left-sided headaches with occasional vomiting  No visual changes related  She admits to fatigue  No weight loss weight gain fever chills night sweats chest pain nausea diarrhea constipation shortness of breath sore throat blurry vision dysuria abdominal pain etc   All other review of systems are negative  Vitals:  Vitals:    08/11/20 1340   BP: 160/90   Pulse: 63   Temp: (!) 96 9 °F (36 1 °C)       Physical Exam:  Patient is a healthy-appearing middle-aged white female awake alert no distress  She is 5 ft 4 in 180 lb  Vital signs as above  Skin warm dry  Head normocephalic atraumatic  She has some tenderness along the left temple artery with what appears to be a palpable pulse  Eyes JAMA a m  intact  Ears nose within normal limits  Throat gag reflex intact  Neck no masses thyromegaly lymphadenopathy palpable    Back no CVA or spinal tenderness  Lungs clear to a and P  Cor regular rate and rhythm no murmurs carotid bruits  Abdomen soft flat firm nontender no obvious masses or hernias are noted  Extremities negative CC E  Neurologically A&O x3 cranial nerves 2-12 intact  Lab Results: I have personally reviewed pertinent reports  See below  Imaging: I have personally reviewed pertinent reports  EKG, Pathology, and Other Studies: I have personally reviewed pertinent reports  No visits with results within 1 Day(s) from this visit  Latest known visit with results is:   Lab on 08/07/2020   Component Date Value    Sed Rate 08/07/2020 30*    CRP 08/07/2020 11 1*    Sodium 08/07/2020 129*    Potassium 08/07/2020 5 2     Chloride 08/07/2020 95*    CO2 08/07/2020 27     ANION GAP 08/07/2020 7     BUN 08/07/2020 12     Creatinine 08/07/2020 0 80     Glucose 08/07/2020 107     Calcium 08/07/2020 9 7     AST 08/07/2020 10     ALT 08/07/2020 25     Alkaline Phosphatase 08/07/2020 52     Total Protein 08/07/2020 7 8     Albumin 08/07/2020 4 0     Total Bilirubin 08/07/2020 0 47     eGFR 08/07/2020 77     WBC 08/07/2020 6 72     RBC 08/07/2020 4 00     Hemoglobin 08/07/2020 13 0     Hematocrit 08/07/2020 39 1     MCV 08/07/2020 98     MCH 08/07/2020 32 5     MCHC 08/07/2020 33 2     RDW 08/07/2020 13 1     Platelets 96/31/0696 353     MPV 08/07/2020 9 2          Impression:  Possible temporal arteritis  The implications of temporal arteritis are discussed with the patient and her   Plan:  Left-sided temporal artery biopsy at the earliest possible date

## 2020-08-11 NOTE — H&P (VIEW-ONLY)
Chief Complaint:  Left temporal headache    History of Present Illness:   Patient is a 24-year-old white female presents to the office today from her family [de-identified] office in regards to persistent left-sided temporal headaches  She has had recurring headaches for the past 2-3 weeks  Primarily on the left side  It was associated with vomiting sometimes  The possibility of temporal arteritis was considered by her family physician and she was placed on steroids  At that time she was referred office for evaluation for left-sided temporal artery biopsy  Patient denies any significant ocular changes are involvement  She has felt better on the prednisone      Past Medical History:   Past Medical History:   Diagnosis Date    Anxiety state 12/19/2014    Benign essential hypertension 1/14/2010    Chronic rhinitis 1/12/2012    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 hair loss 2/4/2020    Major depressive disorder with single episode, in full remission ([facility] ) 10/10/2019    Mild persistent asthma without complication 7/0/7092    Right chronic serous otitis media 11/7/2017    Added automatically from request for surgery [id number]         Past Surgical History:    Past Surgical History:   Procedure Laterality Date    LAPAROSCOPY      X2 for endometriosis         Allergies:  No Known Allergies      Medications:    Current Outpatient Medications:     Aluminum & Magnesium Hydroxide (MAGNESIUM-ALUMINUM PO), Take by mouth, Disp: , Rfl:     amLODIPine (NORVASC) 5 mg tablet, One tablet MWF, 1/2 tablet other days, Disp: 90 tablet, Rfl: 3    loratadine (CLARITIN) 10 mg tablet, Take 10 mg by mouth, Disp: , Rfl:     meloxicam (MOBIC) 15 mg tablet, Take 1 tablet (15 mg total) by mouth daily, Disp: 90 tablet, Rfl: 3    metoprolol succinate (TOPROL-XL) 100 mg 24 hr tablet, Take 1 tablet (100 mg total) by mouth daily, Disp: 90 tablet, Rfl: 3    olmesartan (BENICAR) 40 mg tablet, Take 1 tablet (40 mg total) by mouth daily, Disp: 90 tablet, Rfl: 3   Omega-3 1000 MG CAPS, Take by mouth, Disp: , Rfl:     predniSONE 20 mg tablet, Three tablets daily or as directed, Disp: 90 tablet, Rfl: 0    sertraline (ZOLOFT) 50 mg tablet, Take 1 tablet (50 mg total) by mouth daily, Disp: 90 tablet, Rfl: 3    spironolactone (ALDACTONE) 50 mg tablet, Take 1 tablet (50 mg total) by mouth daily, Disp: 90 tablet, Rfl: 5      Social History:  Social History     Social History     Substance and Sexual Activity   Alcohol Use Yes    Frequency: Monthly or less    Comment: socially     Social History     Substance and Sexual Activity   Drug Use Never     Social History     Tobacco Use   Smoking Status Never Smoker   Smokeless Tobacco Never Used         Family History:    Family History   Problem Relation Age of Onset    Coronary artery disease Mother     Coronary artery disease Father     No Known Problems Maternal Grandmother     No Known Problems Maternal Grandfather     Leukemia Paternal Grandmother 71    No Known Problems Paternal Grandfather     No Known Problems Son     No Known Problems Son          Review of Systems:    As per the HPI left-sided headaches with occasional vomiting  No visual changes related  She admits to fatigue  No weight loss weight gain fever chills night sweats chest pain nausea diarrhea constipation shortness of breath sore throat blurry vision dysuria abdominal pain etc   All other review of systems are negative  Vitals:  Vitals:    08/11/20 1340   BP: 160/90   Pulse: 63   Temp: (!) 96 9 °F (36 1 °C)       Physical Exam:  Patient is a healthy-appearing middle-aged white female awake alert no distress  She is 5 ft 4 in 180 lb  Vital signs as above  Skin warm dry  Head normocephalic atraumatic  She has some tenderness along the left temple artery with what appears to be a palpable pulse  Eyes JAMA a m  intact  Ears nose within normal limits  Throat gag reflex intact  Neck no masses thyromegaly lymphadenopathy palpable    Back no CVA or spinal tenderness  Lungs clear to a and P  Cor regular rate and rhythm no murmurs carotid bruits  Abdomen soft flat firm nontender no obvious masses or hernias are noted  Extremities negative CC E  Neurologically A&O x3 cranial nerves 2-12 intact  Lab Results: I have personally reviewed pertinent reports  See below  Imaging: I have personally reviewed pertinent reports  EKG, Pathology, and Other Studies: I have personally reviewed pertinent reports  No visits with results within 1 Day(s) from this visit  Latest known visit with results is:   Lab on 08/07/2020   Component Date Value    Sed Rate 08/07/2020 30*    CRP 08/07/2020 11 1*    Sodium 08/07/2020 129*    Potassium 08/07/2020 5 2     Chloride 08/07/2020 95*    CO2 08/07/2020 27     ANION GAP 08/07/2020 7     BUN 08/07/2020 12     Creatinine 08/07/2020 0 80     Glucose 08/07/2020 107     Calcium 08/07/2020 9 7     AST 08/07/2020 10     ALT 08/07/2020 25     Alkaline Phosphatase 08/07/2020 52     Total Protein 08/07/2020 7 8     Albumin 08/07/2020 4 0     Total Bilirubin 08/07/2020 0 47     eGFR 08/07/2020 77     WBC 08/07/2020 6 72     RBC 08/07/2020 4 00     Hemoglobin 08/07/2020 13 0     Hematocrit 08/07/2020 39 1     MCV 08/07/2020 98     MCH 08/07/2020 32 5     MCHC 08/07/2020 33 2     RDW 08/07/2020 13 1     Platelets 23/51/8191 353     MPV 08/07/2020 9 2          Impression:  Possible temporal arteritis  The implications of temporal arteritis are discussed with the patient and her   Plan:  Left-sided temporal artery biopsy at the earliest possible date

## 2020-08-13 ENCOUNTER — ANESTHESIA EVENT (OUTPATIENT)
Dept: PERIOP | Facility: HOSPITAL | Age: 65
End: 2020-08-13
Payer: MEDICARE

## 2020-08-13 NOTE — ANESTHESIA PREPROCEDURE EVALUATION
Procedure:  TEMPORAL ARTERY BIOPSY (Left Head)    Relevant Problems   CARDIO   (+) Benign essential hypertension      NEURO/PSYCH   (+) Anxiety state   (+) Headache   (+) Major depressive disorder with single episode, in full remission (HCC)      PULMONARY   (+) Mild persistent asthma without complication        Physical Exam    Airway    Mallampati score: II  TM Distance: >3 FB  Neck ROM: full     Dental   No notable dental hx     Cardiovascular  Cardiovascular exam normal    Pulmonary  Pulmonary exam normal     Other Findings        Anesthesia Plan  ASA Score- 2     Anesthesia Type- IV sedation with anesthesia with ASA Monitors  Additional Monitors:   Airway Plan:           Plan Factors-Exercise tolerance (METS): >4 METS  Induction-     Postoperative Plan-     Informed Consent- Anesthetic plan and risks discussed with patient  I personally reviewed this patient with the CRNA  Discussed and agreed on the Anesthesia Plan with the ABILIO Porter

## 2020-08-14 ENCOUNTER — ANESTHESIA (OUTPATIENT)
Dept: PERIOP | Facility: HOSPITAL | Age: 65
End: 2020-08-14
Payer: MEDICARE

## 2020-08-14 ENCOUNTER — HOSPITAL ENCOUNTER (OUTPATIENT)
Facility: HOSPITAL | Age: 65
Setting detail: OUTPATIENT SURGERY
Discharge: HOME | End: 2020-08-14
Attending: SPECIALIST | Admitting: SPECIALIST
Payer: MEDICARE

## 2020-08-14 VITALS
RESPIRATION RATE: 16 BRPM | OXYGEN SATURATION: 99 % | BODY MASS INDEX: 29.02 KG/M2 | DIASTOLIC BLOOD PRESSURE: 87 MMHG | HEIGHT: 64 IN | SYSTOLIC BLOOD PRESSURE: 183 MMHG | TEMPERATURE: 97.6 F | WEIGHT: 170 LBS | HEART RATE: 62 BPM

## 2020-08-14 DIAGNOSIS — R51.9 LEFT TEMPORAL HEADACHE: ICD-10-CM

## 2020-08-14 DIAGNOSIS — M31.6 TEMPORAL ARTERITIS (HCC): ICD-10-CM

## 2020-08-14 PROCEDURE — 88305 TISSUE EXAM BY PATHOLOGIST: CPT | Performed by: PATHOLOGY

## 2020-08-14 PROCEDURE — 37609 LIGATION/BX TEMPORAL ARTERY: CPT | Performed by: SPECIALIST

## 2020-08-14 RX ORDER — MAGNESIUM HYDROXIDE 1200 MG/15ML
LIQUID ORAL AS NEEDED
Status: DISCONTINUED | OUTPATIENT
Start: 2020-08-14 | End: 2020-08-14 | Stop reason: HOSPADM

## 2020-08-14 RX ORDER — IBUPROFEN 600 MG/1
600 TABLET ORAL EVERY 6 HOURS PRN
Status: DISCONTINUED | OUTPATIENT
Start: 2020-08-14 | End: 2020-08-14 | Stop reason: HOSPADM

## 2020-08-14 RX ORDER — KETOROLAC TROMETHAMINE 30 MG/ML
INJECTION, SOLUTION INTRAMUSCULAR; INTRAVENOUS AS NEEDED
Status: DISCONTINUED | OUTPATIENT
Start: 2020-08-14 | End: 2020-08-14

## 2020-08-14 RX ORDER — PROPOFOL 10 MG/ML
INJECTION, EMULSION INTRAVENOUS AS NEEDED
Status: DISCONTINUED | OUTPATIENT
Start: 2020-08-14 | End: 2020-08-14

## 2020-08-14 RX ORDER — SODIUM CHLORIDE, SODIUM LACTATE, POTASSIUM CHLORIDE, CALCIUM CHLORIDE 600; 310; 30; 20 MG/100ML; MG/100ML; MG/100ML; MG/100ML
INJECTION, SOLUTION INTRAVENOUS CONTINUOUS PRN
Status: DISCONTINUED | OUTPATIENT
Start: 2020-08-14 | End: 2020-08-14

## 2020-08-14 RX ORDER — FENTANYL CITRATE/PF 50 MCG/ML
25 SYRINGE (ML) INJECTION
Status: COMPLETED | OUTPATIENT
Start: 2020-08-14 | End: 2020-08-14

## 2020-08-14 RX ORDER — ONDANSETRON 2 MG/ML
INJECTION INTRAMUSCULAR; INTRAVENOUS AS NEEDED
Status: DISCONTINUED | OUTPATIENT
Start: 2020-08-14 | End: 2020-08-14

## 2020-08-14 RX ORDER — PROPOFOL 10 MG/ML
INJECTION, EMULSION INTRAVENOUS CONTINUOUS PRN
Status: DISCONTINUED | OUTPATIENT
Start: 2020-08-14 | End: 2020-08-14

## 2020-08-14 RX ORDER — GLYCOPYRROLATE 0.2 MG/ML
INJECTION INTRAMUSCULAR; INTRAVENOUS AS NEEDED
Status: DISCONTINUED | OUTPATIENT
Start: 2020-08-14 | End: 2020-08-14

## 2020-08-14 RX ORDER — MIDAZOLAM HYDROCHLORIDE 2 MG/2ML
INJECTION, SOLUTION INTRAMUSCULAR; INTRAVENOUS AS NEEDED
Status: DISCONTINUED | OUTPATIENT
Start: 2020-08-14 | End: 2020-08-14

## 2020-08-14 RX ORDER — LIDOCAINE HYDROCHLORIDE 10 MG/ML
INJECTION, SOLUTION EPIDURAL; INFILTRATION; INTRACAUDAL; PERINEURAL AS NEEDED
Status: DISCONTINUED | OUTPATIENT
Start: 2020-08-14 | End: 2020-08-14 | Stop reason: HOSPADM

## 2020-08-14 RX ORDER — DIPHENHYDRAMINE HYDROCHLORIDE 50 MG/ML
12.5 INJECTION INTRAMUSCULAR; INTRAVENOUS ONCE AS NEEDED
Status: DISCONTINUED | OUTPATIENT
Start: 2020-08-14 | End: 2020-08-14 | Stop reason: HOSPADM

## 2020-08-14 RX ORDER — CEFAZOLIN SODIUM 2 G/50ML
2000 SOLUTION INTRAVENOUS ONCE
Status: COMPLETED | OUTPATIENT
Start: 2020-08-14 | End: 2020-08-14

## 2020-08-14 RX ORDER — ONDANSETRON 2 MG/ML
4 INJECTION INTRAMUSCULAR; INTRAVENOUS EVERY 8 HOURS PRN
Status: DISCONTINUED | OUTPATIENT
Start: 2020-08-14 | End: 2020-08-14 | Stop reason: HOSPADM

## 2020-08-14 RX ORDER — HYDROMORPHONE HCL/PF 1 MG/ML
0.5 SYRINGE (ML) INJECTION
Status: DISCONTINUED | OUTPATIENT
Start: 2020-08-14 | End: 2020-08-14 | Stop reason: HOSPADM

## 2020-08-14 RX ORDER — FENTANYL CITRATE 50 UG/ML
INJECTION, SOLUTION INTRAMUSCULAR; INTRAVENOUS AS NEEDED
Status: DISCONTINUED | OUTPATIENT
Start: 2020-08-14 | End: 2020-08-14

## 2020-08-14 RX ORDER — LIDOCAINE HYDROCHLORIDE 10 MG/ML
INJECTION, SOLUTION EPIDURAL; INFILTRATION; INTRACAUDAL; PERINEURAL AS NEEDED
Status: DISCONTINUED | OUTPATIENT
Start: 2020-08-14 | End: 2020-08-14

## 2020-08-14 RX ORDER — SODIUM CHLORIDE, SODIUM LACTATE, POTASSIUM CHLORIDE, CALCIUM CHLORIDE 600; 310; 30; 20 MG/100ML; MG/100ML; MG/100ML; MG/100ML
75 INJECTION, SOLUTION INTRAVENOUS CONTINUOUS
Status: DISCONTINUED | OUTPATIENT
Start: 2020-08-14 | End: 2020-08-14 | Stop reason: HOSPADM

## 2020-08-14 RX ORDER — SODIUM CHLORIDE, SODIUM LACTATE, POTASSIUM CHLORIDE, CALCIUM CHLORIDE 600; 310; 30; 20 MG/100ML; MG/100ML; MG/100ML; MG/100ML
125 INJECTION, SOLUTION INTRAVENOUS CONTINUOUS
Status: DISCONTINUED | OUTPATIENT
Start: 2020-08-14 | End: 2020-08-14 | Stop reason: HOSPADM

## 2020-08-14 RX ADMIN — GLYCOPYRROLATE 0.4 MG: 0.2 INJECTION, SOLUTION INTRAMUSCULAR; INTRAVENOUS at 11:56

## 2020-08-14 RX ADMIN — PROPOFOL 50 MG: 10 INJECTION, EMULSION INTRAVENOUS at 12:03

## 2020-08-14 RX ADMIN — PROPOFOL 50 MG: 10 INJECTION, EMULSION INTRAVENOUS at 11:11

## 2020-08-14 RX ADMIN — FENTANYL CITRATE 25 MCG: 50 INJECTION INTRAMUSCULAR; INTRAVENOUS at 13:05

## 2020-08-14 RX ADMIN — CEFAZOLIN SODIUM 2000 MG: 2 SOLUTION INTRAVENOUS at 10:55

## 2020-08-14 RX ADMIN — MIDAZOLAM HYDROCHLORIDE 2 MG: 1 INJECTION, SOLUTION INTRAMUSCULAR; INTRAVENOUS at 11:07

## 2020-08-14 RX ADMIN — HYDROMORPHONE HYDROCHLORIDE 0.5 MG: 1 INJECTION, SOLUTION INTRAMUSCULAR; INTRAVENOUS; SUBCUTANEOUS at 13:23

## 2020-08-14 RX ADMIN — PROPOFOL 50 MCG/KG/MIN: 10 INJECTION, EMULSION INTRAVENOUS at 11:11

## 2020-08-14 RX ADMIN — SODIUM CHLORIDE, SODIUM LACTATE, POTASSIUM CHLORIDE, AND CALCIUM CHLORIDE: .6; .31; .03; .02 INJECTION, SOLUTION INTRAVENOUS at 12:12

## 2020-08-14 RX ADMIN — SODIUM CHLORIDE, SODIUM LACTATE, POTASSIUM CHLORIDE, AND CALCIUM CHLORIDE: .6; .31; .03; .02 INJECTION, SOLUTION INTRAVENOUS at 10:50

## 2020-08-14 RX ADMIN — FENTANYL CITRATE 25 MCG: 50 INJECTION INTRAMUSCULAR; INTRAVENOUS at 12:45

## 2020-08-14 RX ADMIN — FENTANYL CITRATE 50 MCG: 50 INJECTION INTRAMUSCULAR; INTRAVENOUS at 11:11

## 2020-08-14 RX ADMIN — FENTANYL CITRATE 25 MCG: 50 INJECTION INTRAMUSCULAR; INTRAVENOUS at 13:00

## 2020-08-14 RX ADMIN — FENTANYL CITRATE 50 MCG: 50 INJECTION INTRAMUSCULAR; INTRAVENOUS at 12:03

## 2020-08-14 RX ADMIN — LIDOCAINE HYDROCHLORIDE 50 MG: 10 INJECTION, SOLUTION EPIDURAL; INFILTRATION; INTRACAUDAL; PERINEURAL at 11:11

## 2020-08-14 RX ADMIN — KETOROLAC TROMETHAMINE 30 MG: 30 INJECTION, SOLUTION INTRAMUSCULAR; INTRAVENOUS at 11:15

## 2020-08-14 RX ADMIN — ONDANSETRON HYDROCHLORIDE 4 MG: 2 INJECTION, SOLUTION INTRAMUSCULAR; INTRAVENOUS at 11:15

## 2020-08-14 RX ADMIN — FENTANYL CITRATE 25 MCG: 50 INJECTION INTRAMUSCULAR; INTRAVENOUS at 12:55

## 2020-08-14 NOTE — NURSING NOTE
No distress  Surgical glue intact to incision left temporal area  No redness or drainage  No complaint of pain  Dr Mathieu Villa was here to see her prior to discharge

## 2020-08-14 NOTE — OP NOTE
OPERATIVE REPORT  PATIENT NAME: Eyal Velasquez    :  1955  MRN: [id number]  Pt Location:  OR ROOM 07    SURGERY DATE: 2020    Surgeon(s) and Role:     * Marcela Weinberg MD - Primary    Preop Diagnosis:  Left temporal headache [R51]  Temporal arteritis ([address] ) [M31 6]    Post-Op Diagnosis Codes:     * Left temporal headache [R51]     * Temporal arteritis (HCC) [M31 6]    Procedure(s) (LRB):  TEMPORAL ARTERY BIOPSY (Left)    Specimen(s):  ID Type Source Tests Collected by Time Destination   1 : left, only,  temporal artery biopsy Tissue Artery TISSUE EXAM Marcela Weinberg MD 2020 11:33 AM        Estimated Blood Loss:   Minimal    Drains:  * No LDAs found *    Anesthesia Type:   IV Sedation with Anesthesia    Operative Indications:  Left temporal headache [R51]  Temporal arteritis ([address] ) [M31 6]      Operative Findings:      Complications:   None    Procedure and Technique:  Patient brought the operating room postop table supine position with her head turned to the right  The left temple area was shaved slightly prepped and draped in usual sterile fashion  The temporal artery had been localized in the holding area and marked with a marking pencil  This area was infiltrated with 1% lidocaine  15  Scalp blade was used make a curvilinear incision directly over the area  Carried down subcutaneous tissue using the Bovie  Bleeders were cauterized at that time  Dissection was carried down bluntly spreading with the Metzenbaum scissors  A portion of the artery appeared to be visible  At that point a sterile Doppler was obtained and placed over the area  Pulsatile flow was identified  Further dissection of the vessel precipitated tearing of a side branch which actually bled fairly well  This was eventually identified and cauterized  The artery was mobilized proximally and distally using sharp and blunt dissection  It was circumferentially mobilized  There was a side branch going toward the ear  After clearing the superior aspect a 4 0 Vicryl tie was passed around this and ligated  Dissecting inferiorly verifying the vessel with the Doppler the distal end was reached  Four 0 Vicryl was used to ligate that inferior aspect of the vessel  The vessel was then divided in between the 2 ties  There was the branch extending toward the ear  This was dissected free circumferentially and then ligated with 4 0  Specimen was removed sent to pathology for histological diagnosis  Some additional small bleeding was controlled from side branches  They appeared to be patent and would bleed  After adequate meticulous hemostasis was obtained the areas inspected no bleeding was noted  A subcu stitch of 4 Monocryl was placed  Running 4 Monocryl in a subcuticular fashion was performed  Skin glue was placed  The patient tolerated procedure well  The estimated blood was minimal patient she was delivered to the recovery room stable condition     I was present for the entire procedure    Patient Disposition:  PACU     SIGNATURE: Guerrero Soria MD  DATE: August 14, 2020  TIME: 12:26 PM

## 2020-08-14 NOTE — PERIOPERATIVE NURSING NOTE
Left temporal incision with SX glue, clean/dry/intact  Reports of headache level 2/10 on left side of head  No pain med requested at this time

## 2020-08-14 NOTE — NURSING NOTE
Left temporal incision with surgical glue, CLI  Icepack on back of head  Pain "tolerable 1/10  VSS  Continue to monitor

## 2020-08-17 ENCOUNTER — TELEPHONE (OUTPATIENT)
Dept: FAMILY MEDICINE CLINIC | Facility: CLINIC | Age: 65
End: 2020-08-17

## 2020-08-17 NOTE — TELEPHONE ENCOUNTER
Spoke to Julio who said she had a good experience with Dr Monica Newton and at [facility]  Told me that her headache is 0-1  Advised that the biopsy result is still pending

## 2020-08-17 NOTE — TELEPHONE ENCOUNTER
Patient called in to let you know that she had her biopsy done on Friday 8/14/20, and would like to talk to you briefly about how things went

## 2020-08-18 DIAGNOSIS — M31.6 TEMPORAL ARTERITIS (HCC): Primary | ICD-10-CM

## 2020-08-18 DIAGNOSIS — R51.9 LEFT TEMPORAL HEADACHE: ICD-10-CM

## 2020-08-18 NOTE — PROGRESS NOTES
Spoke to Aldair Prather about her temporal artery biopsy which is negative  Asked her to obtain a color Doppler ultrasound of the right temple artery  Also arrange for follow-up in 3 days to discuss results and decide further course of treatment for her headaches which today are rated as a 0-1

## 2020-08-19 ENCOUNTER — HOSPITAL ENCOUNTER (OUTPATIENT)
Dept: NON INVASIVE DIAGNOSTICS | Facility: HOSPITAL | Age: 65
Discharge: HOME | End: 2020-08-19
Payer: MEDICARE

## 2020-08-19 DIAGNOSIS — M31.6 TEMPORAL ARTERITIS (HCC): ICD-10-CM

## 2020-08-19 DIAGNOSIS — R51.9 HEADACHE: ICD-10-CM

## 2020-08-19 PROCEDURE — 93886 INTRACRANIAL COMPLETE STUDY: CPT | Performed by: SURGERY

## 2020-08-19 PROCEDURE — 93882 EXTRACRANIAL UNI/LTD STUDY: CPT

## 2020-08-21 ENCOUNTER — OFFICE VISIT (OUTPATIENT)
Dept: FAMILY MEDICINE CLINIC | Facility: CLINIC | Age: 65
End: 2020-08-21
Payer: MEDICARE

## 2020-08-21 ENCOUNTER — LAB (OUTPATIENT)
Dept: LAB | Age: 65
End: 2020-08-21
Payer: MEDICARE

## 2020-08-21 VITALS
HEART RATE: 61 BPM | SYSTOLIC BLOOD PRESSURE: 139 MMHG | BODY MASS INDEX: 29.6 KG/M2 | DIASTOLIC BLOOD PRESSURE: 79 MMHG | OXYGEN SATURATION: 97 % | TEMPERATURE: 97.8 F | WEIGHT: 173.4 LBS | HEIGHT: 64 IN

## 2020-08-21 DIAGNOSIS — R51.9 LEFT TEMPORAL HEADACHE: ICD-10-CM

## 2020-08-21 DIAGNOSIS — Z12.31 ENCOUNTER FOR SCREENING MAMMOGRAM FOR MALIGNANT NEOPLASM OF BREAST: Primary | ICD-10-CM

## 2020-08-21 DIAGNOSIS — M31.6 TEMPORAL ARTERITIS (HCC): ICD-10-CM

## 2020-08-21 DIAGNOSIS — R51.9 NONINTRACTABLE HEADACHE, UNSPECIFIED CHRONICITY PATTERN, UNSPECIFIED HEADACHE TYPE: ICD-10-CM

## 2020-08-21 LAB
CRP SERPL QL: <3 MG/L
ERYTHROCYTE [SEDIMENTATION RATE] IN BLOOD: 22 MM/HOUR (ref 0–29)

## 2020-08-21 PROCEDURE — 3075F SYST BP GE 130 - 139MM HG: CPT | Performed by: FAMILY MEDICINE

## 2020-08-21 PROCEDURE — 86140 C-REACTIVE PROTEIN: CPT

## 2020-08-21 PROCEDURE — 36415 COLL VENOUS BLD VENIPUNCTURE: CPT

## 2020-08-21 PROCEDURE — 4040F PNEUMOC VAC/ADMIN/RCVD: CPT | Performed by: FAMILY MEDICINE

## 2020-08-21 PROCEDURE — 3078F DIAST BP <80 MM HG: CPT | Performed by: FAMILY MEDICINE

## 2020-08-21 PROCEDURE — 1036F TOBACCO NON-USER: CPT | Performed by: FAMILY MEDICINE

## 2020-08-21 PROCEDURE — 3008F BODY MASS INDEX DOCD: CPT | Performed by: FAMILY MEDICINE

## 2020-08-21 PROCEDURE — 99214 OFFICE O/P EST MOD 30 MIN: CPT | Performed by: FAMILY MEDICINE

## 2020-08-21 PROCEDURE — 85652 RBC SED RATE AUTOMATED: CPT

## 2020-08-21 RX ORDER — PREDNISONE 20 MG/1
TABLET ORAL
Qty: 90 TABLET | Refills: 0
Start: 2020-08-21 | End: 2020-08-31

## 2020-08-21 NOTE — PROGRESS NOTES
Chief Complaint   Patient presents with    Follow-up     labs--patient's 's temp is 97 4        HPI   Here for follow-up of presumed temporal arteritis  Headache is rated as a 1- 2, sometimes a 3  Still in the temporal area  And in  the left eye area  Fatigue is improved  She feels more energized  More active  Not yet walking but would like to  Temporal artery biopsy was negative  Color Doppler ultrasound on the right side was negative  Present dose of prednisone is still 60 mg daily  Blood pressure spiked after her procedure but quickly came back down  Had an episode last week where after walking 25 ft, she felt lightheaded and went down  Was able to catch herself  This occurred twice  Inquires about other possible causes of her headache which could include B12 deficiency or tension  Or vision  Vision has not changed  She does need her contact lenses  Headaches do not awaken her at night  No weakness in arm or legs  No paresthesias or sensory changes      Past Medical History:   Diagnosis Date    Anxiety state 12/19/2014    Benign essential hypertension 1/14/2010    Chronic rhinitis 1/12/2012    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 hair loss 2/4/2020    [facility] episode, [facility] 10/10/2019    Mild persistent asthma without complication 6/7/8442    Right chronic serous otitis media 11/7/2017    Added automatically from request for surgery [id number]        Past Surgical History:   Procedure Laterality Date    LAPAROSCOPY      X2 for endometriosis    [place] TEMPORAL ARTERY LIGATN OR BX Left 8/14/2020    Procedure: TEMPORAL ARTERY BIOPSY;  Surgeon: Maribel Mayberry MD;  Location: [address] [place] [address];  Service: General       Social History     Tobacco Use    Smoking status: Never Smoker    Smokeless tobacco: Never Used   Substance Use Topics    Alcohol use: Yes     Frequency: Monthly or less     Comment: socially       Social History     Social History Narrative  since 1981  Two sons  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, gardening, walking, needle work, painting, writing books  The following portions of the patient's history were reviewed and updated as appropriate: allergies, current medications, past family history, past medical history, past social history, past surgical history and problem list       Review of Systems   Constitutional: Negative for activity change and appetite change  HENT: Negative for ear pain and hearing loss  Eyes: Negative for visual disturbance  Respiratory: Negative for shortness of breath and wheezing  Cardiovascular: Negative for chest pain and leg swelling  Gastrointestinal: Negative for abdominal pain, constipation and diarrhea  Genitourinary: Negative for difficulty urinating  Musculoskeletal: Negative for arthralgias and back pain  Skin: Negative for rash  Neurological: Negative for headaches  Psychiatric/Behavioral: Negative for dysphoric mood  The patient is not nervous/anxious  /79 (BP Location: Left arm, Patient Position: Sitting, Cuff Size: Standard)   Pulse 61   Temp 97 8 °F (36 6 °C) (Tympanic)   Ht 5' 3 75" (1 619 m)   Wt 78 7 kg (173 lb 6 4 oz)   SpO2 97%   BMI 30 00 kg/m²      Physical Exam     Appears well  Extraocular motions intact  Visual fields intact  Neurologic grossly intact  Good motor strength  No pronator drift  Finger-to-nose testing is normal   No dysdiadochokinesis                Current Outpatient Medications:     Aluminum & Magnesium Hydroxide (MAGNESIUM-ALUMINUM PO), Take by mouth, Disp: , Rfl:     amLODIPine (NORVASC) 5 mg tablet, One tablet MWF, 1/2 tablet other days, Disp: 90 tablet, Rfl: 3    loratadine (CLARITIN) 10 mg tablet, Take 10 mg by mouth, Disp: , Rfl:     meloxicam (MOBIC) 15 mg tablet, Take 1 tablet (15 mg total) by mouth daily, Disp: 90 tablet, Rfl: 3   metoprolol succinate (TOPROL-XL) 100 mg 24 hr tablet, Take 1 tablet (100 mg total) by mouth daily, Disp: 90 tablet, Rfl: 3    olmesartan (BENICAR) 40 mg tablet, Take 1 tablet (40 mg total) by mouth daily, Disp: 90 tablet, Rfl: 3    Omega-3 1000 MG CAPS, Take by mouth, Disp: , Rfl:     predniSONE 20 mg tablet, 2 tablets daily or as directed, Disp: 90 tablet, Rfl: 0    sertraline (ZOLOFT) 50 mg tablet, Take 1 tablet (50 mg total) by mouth daily, Disp: 90 tablet, Rfl: 3    spironolactone (ALDACTONE) 50 mg tablet, Take 1 tablet (50 mg total) by mouth daily, Disp: 90 tablet, Rfl: 5     No problem-specific Assessment & Plan notes found for this encounter  Diagnoses and all orders for this visit:    Encounter for screening mammogram for malignant neoplasm of breast  -     Mammo screening bilateral w 3d & cad; Future    Temporal arteritis ([facility] )  -     MRI brain wo contrast; Future  -     C-reactive protein; Future  -     Sedimentation rate, automated; Future    Nonintractable headache, unspecified chronicity pattern, unspecified headache type  -     MRI brain wo contrast; Future    Left temporal headache  -     MRI brain wo contrast; Future  -     predniSONE 20 mg tablet; 2 tablets daily or as directed        Patient Instructions    Review of temporal artery biopsy and colored Doppler ultrasound which were both negative  The diagnosis of temporal arteritis is now Clinical based on her symptoms and the elevated CRP  The risks of long-term steroids were discussed  Obtain MRI of brain to rule out any abnormality that could be contributing to her headache  Decrease prednisone to 40 mg daily  Recheck sed rate and CRP  Progress report in about 6 days with follow-up visit in 2 weeks  Activity is as tolerated

## 2020-08-21 NOTE — PATIENT INSTRUCTIONS
Review of temporal artery biopsy and colored Doppler ultrasound which were both negative  The diagnosis of temporal arteritis is now Clinical based on her symptoms and the elevated CRP  The risks of long-term steroids were discussed  Obtain MRI of brain to rule out any abnormality that could be contributing to her headache  Decrease prednisone to 40 mg daily  Recheck sed rate and CRP  Progress report in about 6 days with follow-up visit in 2 weeks  Activity is as tolerated

## 2020-08-24 NOTE — RESULT ENCOUNTER NOTE
Spoke to patient about her blood work  CRP now in the normal range  Sed rate came down  She notes she had a massage 2 days ago and afterward was totally washed out  Yesterday morning, her blood pressure was about 104 with a heart rate of 81  She did take her blood pressure medication and her blood pressure went up and her heart rate went down  Today's headache is 2-3  MRI scheduled for tomorrow

## 2020-08-25 ENCOUNTER — HOSPITAL ENCOUNTER (OUTPATIENT)
Dept: RADIOLOGY | Facility: IMAGING CENTER | Age: 65
Discharge: HOME | End: 2020-08-25
Payer: MEDICARE

## 2020-08-25 DIAGNOSIS — R51.9 NONINTRACTABLE HEADACHE, UNSPECIFIED CHRONICITY PATTERN, UNSPECIFIED HEADACHE TYPE: ICD-10-CM

## 2020-08-25 DIAGNOSIS — R51.9 LEFT TEMPORAL HEADACHE: ICD-10-CM

## 2020-08-25 DIAGNOSIS — M31.6 TEMPORAL ARTERITIS (HCC): ICD-10-CM

## 2020-08-25 PROCEDURE — G1004 CDSM NDSC: HCPCS

## 2020-08-25 PROCEDURE — 70551 MRI BRAIN STEM W/O DYE: CPT

## 2020-08-28 DIAGNOSIS — R51.9 LEFT TEMPORAL HEADACHE: ICD-10-CM

## 2020-08-31 RX ORDER — PREDNISONE 20 MG/1
TABLET ORAL
Qty: 90 TABLET | Refills: 5 | Status: SHIPPED | OUTPATIENT
Start: 2020-08-31 | End: 2020-12-03

## 2020-09-04 ENCOUNTER — OFFICE VISIT (OUTPATIENT)
Dept: FAMILY MEDICINE CLINIC | Facility: CLINIC | Age: 65
End: 2020-09-04
Payer: MEDICARE

## 2020-09-04 VITALS
WEIGHT: 175.8 LBS | HEART RATE: 66 BPM | DIASTOLIC BLOOD PRESSURE: 70 MMHG | HEIGHT: 64 IN | SYSTOLIC BLOOD PRESSURE: 116 MMHG | TEMPERATURE: 99.1 F | OXYGEN SATURATION: 98 % | BODY MASS INDEX: 30.01 KG/M2

## 2020-09-04 DIAGNOSIS — M31.6 TEMPORAL ARTERITIS (HCC): Primary | ICD-10-CM

## 2020-09-04 DIAGNOSIS — I10 BENIGN ESSENTIAL HYPERTENSION: ICD-10-CM

## 2020-09-04 DIAGNOSIS — F32.5 MAJOR DEPRESSIVE DISORDER WITH SINGLE EPISODE, IN FULL REMISSION (HCC): ICD-10-CM

## 2020-09-04 DIAGNOSIS — Z79.52 CURRENT CHRONIC USE OF SYSTEMIC STEROIDS: ICD-10-CM

## 2020-09-04 PROCEDURE — 99214 OFFICE O/P EST MOD 30 MIN: CPT | Performed by: FAMILY MEDICINE

## 2020-09-04 RX ORDER — ALENDRONATE SODIUM 70 MG/1
70 TABLET ORAL
Qty: 12 TABLET | Refills: 3 | Status: SHIPPED | OUTPATIENT
Start: 2020-09-04 | End: 2021-05-17

## 2020-09-04 RX ORDER — PREDNISONE 1 MG/1
TABLET ORAL
Qty: 100 TABLET | Refills: 5 | Status: SHIPPED | OUTPATIENT
Start: 2020-09-04 | End: 2021-04-08

## 2020-09-04 NOTE — PATIENT INSTRUCTIONS
Discussion of her MRI, negative temporal artery biopsy an ultrasound  Still clinically, she could have temporal arteritis  C reactive protein was the only marker which was elevated and the since come down to normal     She is also responded to prednisone with improvement of her headaches  Because of that, will clinically treat as temporal arteritis  Decrease prednisone to 35 mg for the next 2 weeks and then 30 mg for the 2 weeks after that  Obtain a baseline bone density exam   Begin Fosamax or generic alendronate 70 mg once weekly on an empty stomach in the upright position to prevent bone loss  Recheck in 1 month  Alendronate (By mouth)   Alendronate ([id number])  Treats or prevents osteoporosis  Also treats Paget disease of the bone  Brand Name(s): Binosto, Fosamax   There may be other brand names for this medicine  When This Medicine Should Not Be Used: This medicine is not right for everyone  Do not use it if you had an allergic reaction to alendronate, or if you have esophagus problems or trouble swallowing  Do not use it if you cannot stand or sit upright for at least 30 minutes after you take the medicine  How to Use This Medicine:   Liquid, Tablet, Fizzy Tablet  · Take this medicine in the morning on an empty stomach  Follow the directions exactly to lower the risk of esophagus problems  · Sit or stand while you take this medicine  Do not lie down for at least 30 minutes after you take the medicine, and do not lie down until after you have eaten  · Use plain water to take your medicine  The medicine may not work as well if you use other liquids  ¨ Tablet: Swallow whole with 6 to 8 ounces of water  Do not chew or suck on the tablet  ¨ Oral liquid: Measure the oral liquid medicine with a marked measuring spoon, oral syringe, or medicine cup  Drink at least 2 ounces (1/4 cup) of water after you take the liquid medicine    ¨ Effervescent tablet: Dissolve the tablet in 4 ounces of room temperature water  Wait at least 5 minutes after the bubbling stops  Then stir the solution for 10 seconds and drink it  · Wait at least 30 minutes after you take this medicine before you eat or drink or take any other medicine  This will help your body absorb the medicine  · This medicine should come with a Medication Guide  Ask your pharmacist for a copy if you do not have one  · Missed dose: Take a dose the next morning  Do not take 2 tablets on the same day  Then go back to your regular schedule  · Store the medicine in a closed container at room temperature, away from heat, moisture, and direct light  Keep the effervescent tablets in the blister pack until you are ready to use them  Drugs and Foods to Avoid:   Ask your doctor or pharmacist before using any other medicine, including over-the-counter medicines, vitamins, and herbal products  · Some medicines can affect how alendronate works  Tell your doctor if you are using any of the following:   ¨ Cancer medicines  ¨ NSAID pain or arthritis medicine (including aspirin, celecoxib, diclofenac, ibuprofen, naproxen)  ¨ Steroid medicine (including as hydrocortisone, methylprednisolone, prednisone, prednisolone, dexamethasone)  · Take alendronate at least 30 minutes before you take any other oral medicine, including aluminum, magnesium, iron, or calcium supplements, or antacids  Warnings While Using This Medicine:   · Tell your doctor if you are pregnant or breastfeeding, or if you have kidney disease, heartburn, anemia, blood clotting problems, ulcers or other stomach or bowel problems, a vitamin D deficiency, or a history of cancer  Tell your doctor if you have dental problems or if you wear dentures  Also tell your doctor if you smoke or drink alcohol    · This medicine may cause the following problems:   ¨ Damage to your esophagus  ¨ Increased risk for a thigh bone fracture  ¨ Low calcium levels  · Tell any doctor or dentist who treats you that you are using this medicine  This medicine may cause jaw problems, especially if you have a tooth pulled or other dental work  · The effervescent tablet contains sodium  Tell your doctor if you are on a low-salt diet  · Your doctor will check your progress and the effects of this medicine at regular visits  Keep all appointments  · Keep all medicine out of the reach of children  Never share your medicine with anyone  Possible Side Effects While Using This Medicine:   Call your doctor right away if you notice any of these side effects:  · Allergic reaction: Itching or hives, swelling in your face or hands, swelling or tingling in your mouth or throat, chest tightness, trouble breathing  · Blistering, peeling, red skin rash  · Chest pain, new or worsening heartburn, or a burning feeling in your throat  · Muscle spasms or twitching, tingling or numbness in your fingers, toes, or around your mouth  · Pain or difficulty when swallowing  · Pain, swelling, numbness, or a heavy feeling in your mouth or jaw, loose teeth, or other tooth problems  · Severe bone, joint, or muscle pain  · Unusual pain in your thigh, groin, or hip  If you notice these less serious side effects, talk with your doctor:   · Mild stomach pain or upset  If you notice other side effects that you think are caused by this medicine, tell your doctor  Call your doctor for medical advice about side effects  You may report side effects to FDA at 2-887-FDA-3480  © 2017 [address] Information is for End User's use only and may not be sold, redistributed or otherwise used for commercial purposes  The above information is an  only  It is not intended as medical advice for individual conditions or treatments  Talk to your doctor, nurse or pharmacist before following any medical regimen to see if it is safe and effective for you

## 2020-09-04 NOTE — PROGRESS NOTES
Chief Complaint   Patient presents with    Follow-up     2 week         HPI    Here for follow-up of presumed temporal arteritis  Now is on 40 mg of prednisone  Headaches persist on her left temple radiating behind her left ear and down her neck  Also behind the left thigh  Headaches average it 2-3 on a scale of 10  After she takes her dose prednisone, the usually subside  She does take a Tylenol as well  MRI showed some microangiopathic changes but no tumors that  could account for her headaches  As review, she started with left-sided temporal headaches which she would put at a 7-8 scale  She had no jaw claudication  No fever  She did complain of fatigue  Temporal artery biopsy was negative  Sed rate was only minimally elevated  C-reactive protein was elevated at 11 1 and has come down to less than 3 with prednisone  Color guided Doppler ultrasound of the right temporal artery was normal      Mood has been okay with sertraline  Continues on usual blood pressure medications and blood pressure is well controlled at home  Past Medical History:   Diagnosis Date    Anxiety state 12/19/2014    Benign essential hypertension 1/14/2010    Chronic rhinitis 1/12/2012    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 hair loss 2/4/2020    Major depressive disorder with single episode, in full remission ([address] ) 10/10/2019    Mild persistent asthma without complication 1/5/3217    Right chronic serous otitis media 11/7/2017    Added automatically from request for surgery [id number]    Temporal arteritis ([facility] [address] ) 9/4/2020        Past Surgical History:   Procedure Laterality Date    LAPAROSCOPY      X2 for endometriosis    [place] TEMPORAL ARTERY LIGATN OR BX Left 8/14/2020    Procedure: TEMPORAL ARTERY BIOPSY;  Surgeon: Angelica Jimenez MD;  Location: [address] [place] [address];  Service: General       Social History     Tobacco Use    Smoking status: Never Smoker    Smokeless tobacco: Never Used   Substance Use Topics    Alcohol use:  Yes Frequency: Monthly or less     Comment: socially       Social History     Social History Narrative     since 1981  Two sons  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, gardening, walking, needle work, painting, writing books  The following portions of the patient's history were reviewed and updated as appropriate: allergies, current medications, past family history, past medical history, past social history, past surgical history and problem list       Review of Systems   Constitutional: Negative for activity change and appetite change  HENT: Negative for ear pain and hearing loss  Eyes: Negative for visual disturbance  Respiratory: Negative for shortness of breath and wheezing  Cardiovascular: Negative for chest pain and leg swelling  Gastrointestinal: Negative for abdominal pain, constipation and diarrhea  Genitourinary: Negative for difficulty urinating  Musculoskeletal: Negative for arthralgias and back pain  Skin: Negative for rash  Neurological: Positive for headaches (  As per HPI)  Psychiatric/Behavioral: Negative for dysphoric mood  The patient is not nervous/anxious  /70 (BP Location: Right arm)   Pulse 66   Temp 99 1 °F (37 3 °C) (Tympanic)   Ht 5' 3 75" (1 619 m)   Wt 79 7 kg (175 lb 12 8 oz)   SpO2 98%   BMI 30 41 kg/m²      Physical Exam      Appears well  Weight only up 2 lb in the last month  Repeat blood pressure 116/70  Lungs are clear  Heart regular              Current Outpatient Medications:     Aluminum & Magnesium Hydroxide (MAGNESIUM-ALUMINUM PO), Take by mouth, Disp: , Rfl:     amLODIPine (NORVASC) 5 mg tablet, One tablet MWF, 1/2 tablet other days, Disp: 90 tablet, Rfl: 3    loratadine (CLARITIN) 10 mg tablet, Take 10 mg by mouth, Disp: , Rfl:     meloxicam (MOBIC) 15 mg tablet, Take 1 tablet (15 mg total) by mouth daily, Disp: 90 tablet, Rfl: 3    metoprolol succinate (TOPROL-XL) 100 mg 24 hr tablet, Take 1 tablet (100 mg total) by mouth daily, Disp: 90 tablet, Rfl: 3    olmesartan (BENICAR) 40 mg tablet, Take 1 tablet (40 mg total) by mouth daily, Disp: 90 tablet, Rfl: 3    Omega-3 1000 MG CAPS, Take by mouth, Disp: , Rfl:     predniSONE 20 mg tablet, 2 TABLETS DAILY OR AS DIRECTED, Disp: 90 tablet, Rfl: 5    sertraline (ZOLOFT) 50 mg tablet, Take 1 tablet (50 mg total) by mouth daily, Disp: 90 tablet, Rfl: 3    spironolactone (ALDACTONE) 50 mg tablet, Take 1 tablet (50 mg total) by mouth daily, Disp: 90 tablet, Rfl: 5    alendronate (Fosamax) 70 mg tablet, Take 1 tablet (70 mg total) by mouth every 7 days, Disp: 12 tablet, Rfl: 3     No problem-specific Assessment & Plan notes found for this encounter  Diagnoses and all orders for this visit:    [facility]    Benign essential hypertension    Major depressive disorder with single episode, in full remission ([facility] [address] )    Current chronic use of systemic steroids  -     DXA bone density spine hip and pelvis; Future  -     alendronate (Fosamax) 70 mg tablet; Take 1 tablet (70 mg total) by mouth every 7 days        Patient Instructions    Discussion of her MRI, negative temporal artery biopsy an ultrasound  Still clinically, she could have temporal arteritis  C reactive protein was the only marker which was elevated and the since come down to normal     She is also responded to prednisone with improvement of her headaches  Because of that, will clinically treat as temporal arteritis  Decrease prednisone to 35 mg for the next 2 weeks and then 30 mg for the 2 weeks after that  Obtain a baseline bone density exam   Begin Fosamax or generic alendronate 70 mg once weekly on an empty stomach in the upright position to prevent bone loss  Recheck in 1 month  Alendronate (By mouth)   Alendronate ([id number])  Treats or prevents osteoporosis  Also treats Paget disease of the bone     Brand Name(s): Binosto, Fosamax   There may be other brand names for this medicine  When This Medicine Should Not Be Used: This medicine is not right for everyone  Do not use it if you had an allergic reaction to alendronate, or if you have esophagus problems or trouble swallowing  Do not use it if you cannot stand or sit upright for at least 30 minutes after you take the medicine  How to Use This Medicine:   Liquid, Tablet, Fizzy Tablet  · Take this medicine in the morning on an empty stomach  Follow the directions exactly to lower the risk of esophagus problems  · Sit or stand while you take this medicine  Do not lie down for at least 30 minutes after you take the medicine, and do not lie down until after you have eaten  · Use plain water to take your medicine  The medicine may not work as well if you use other liquids  ¨ Tablet: Swallow whole with 6 to 8 ounces of water  Do not chew or suck on the tablet  ¨ Oral liquid: Measure the oral liquid medicine with a marked measuring spoon, oral syringe, or medicine cup  Drink at least 2 ounces (1/4 cup) of water after you take the liquid medicine  ¨ Effervescent tablet: Dissolve the tablet in 4 ounces of room temperature water  Wait at least 5 minutes after the bubbling stops  Then stir the solution for 10 seconds and drink it  · Wait at least 30 minutes after you take this medicine before you eat or drink or take any other medicine  This will help your body absorb the medicine  · This medicine should come with a Medication Guide  Ask your pharmacist for a copy if you do not have one  · Missed dose: Take a dose the next morning  Do not take 2 tablets on the same day  Then go back to your regular schedule  · Store the medicine in a closed container at room temperature, away from heat, moisture, and direct light  Keep the effervescent tablets in the blister pack until you are ready to use them    Drugs and Foods to Avoid:   Ask your doctor or pharmacist before using any other medicine, including over-the-counter medicines, vitamins, and herbal products  · Some medicines can affect how alendronate works  Tell your doctor if you are using any of the following:   ¨ Cancer medicines  ¨ NSAID pain or arthritis medicine (including aspirin, celecoxib, diclofenac, ibuprofen, naproxen)  ¨ Steroid medicine (including as hydrocortisone, methylprednisolone, prednisone, prednisolone, dexamethasone)  · Take alendronate at least 30 minutes before you take any other oral medicine, including aluminum, magnesium, iron, or calcium supplements, or antacids  Warnings While Using This Medicine:   · Tell your doctor if you are pregnant or breastfeeding, or if you have kidney disease, heartburn, anemia, blood clotting problems, ulcers or other stomach or bowel problems, a vitamin D deficiency, or a history of cancer  Tell your doctor if you have dental problems or if you wear dentures  Also tell your doctor if you smoke or drink alcohol  · This medicine may cause the following problems:   ¨ Damage to your esophagus  ¨ Increased risk for a thigh bone fracture  ¨ Low calcium levels  · Tell any doctor or dentist who treats you that you are using this medicine  This medicine may cause jaw problems, especially if you have a tooth pulled or other dental work  · The effervescent tablet contains sodium  Tell your doctor if you are on a low-salt diet  · Your doctor will check your progress and the effects of this medicine at regular visits  Keep all appointments  · Keep all medicine out of the reach of children  Never share your medicine with anyone    Possible Side Effects While Using This Medicine:   Call your doctor right away if you notice any of these side effects:  · Allergic reaction: Itching or hives, swelling in your face or hands, swelling or tingling in your mouth or throat, chest tightness, trouble breathing  · Blistering, peeling, red skin rash  · Chest pain, new or worsening heartburn, or a burning feeling in your throat  · Muscle spasms or twitching, tingling or numbness in your fingers, toes, or around your mouth  · Pain or difficulty when swallowing  · Pain, swelling, numbness, or a heavy feeling in your mouth or jaw, loose teeth, or other tooth problems  · Severe bone, joint, or muscle pain  · Unusual pain in your thigh, groin, or hip  If you notice these less serious side effects, talk with your doctor:   · Mild stomach pain or upset  If you notice other side effects that you think are caused by this medicine, tell your doctor  Call your doctor for medical advice about side effects  You may report side effects to FDA at 8-620-FDA-9971  © 2017 [address] Information is for End User's use only and may not be sold, redistributed or otherwise used for commercial purposes  The above information is an  only  It is not intended as medical advice for individual conditions or treatments  Talk to your doctor, nurse or pharmacist before following any medical regimen to see if it is safe and effective for you

## 2020-09-10 ENCOUNTER — HOSPITAL ENCOUNTER (OUTPATIENT)
Dept: BONE DENSITY | Facility: MEDICAL CENTER | Age: 65
Discharge: HOME | End: 2020-09-10
Payer: MEDICARE

## 2020-09-10 DIAGNOSIS — Z79.52 CURRENT CHRONIC USE OF SYSTEMIC STEROIDS: ICD-10-CM

## 2020-09-10 PROCEDURE — 77080 DXA BONE DENSITY AXIAL: CPT

## 2020-09-18 NOTE — RESULT ENCOUNTER NOTE
Spoke to patient about her bone density which shows osteopenia  She has started Fosamax  Recommend 2000 units of vitamin-D daily  She notes that her headaches are pretty good with the Tylenol that she is using for some knee discomfort  One she takes her daily medication, headache is rated at a 1 or less

## 2020-10-02 ENCOUNTER — OFFICE VISIT (OUTPATIENT)
Dept: FAMILY MEDICINE CLINIC | Facility: CLINIC | Age: 65
End: 2020-10-02
Payer: MEDICARE

## 2020-10-02 VITALS
HEIGHT: 64 IN | HEART RATE: 70 BPM | SYSTOLIC BLOOD PRESSURE: 112 MMHG | WEIGHT: 181.4 LBS | BODY MASS INDEX: 30.97 KG/M2 | DIASTOLIC BLOOD PRESSURE: 64 MMHG | TEMPERATURE: 96.6 F | OXYGEN SATURATION: 96 %

## 2020-10-02 DIAGNOSIS — Z23 NEEDS FLU SHOT: ICD-10-CM

## 2020-10-02 DIAGNOSIS — I10 BENIGN ESSENTIAL HYPERTENSION: ICD-10-CM

## 2020-10-02 DIAGNOSIS — M31.6 TEMPORAL ARTERITIS (HCC): Primary | ICD-10-CM

## 2020-10-02 DIAGNOSIS — E87.1 HYPONATREMIA: ICD-10-CM

## 2020-10-02 DIAGNOSIS — Z79.52 CURRENT CHRONIC USE OF SYSTEMIC STEROIDS: ICD-10-CM

## 2020-10-02 PROCEDURE — 90662 IIV NO PRSV INCREASED AG IM: CPT

## 2020-10-02 PROCEDURE — G0008 ADMIN INFLUENZA VIRUS VAC: HCPCS

## 2020-10-02 PROCEDURE — 99214 OFFICE O/P EST MOD 30 MIN: CPT | Performed by: FAMILY MEDICINE

## 2020-11-04 ENCOUNTER — LAB (OUTPATIENT)
Dept: LAB | Age: 65
End: 2020-11-04
Payer: MEDICARE

## 2020-11-04 DIAGNOSIS — E87.1 HYPONATREMIA: ICD-10-CM

## 2020-11-04 DIAGNOSIS — R53.83 FATIGUE, UNSPECIFIED TYPE: ICD-10-CM

## 2020-11-04 DIAGNOSIS — M31.6 TEMPORAL ARTERITIS (HCC): ICD-10-CM

## 2020-11-04 LAB
ANION GAP SERPL CALCULATED.3IONS-SCNC: 8 MMOL/L (ref 4–13)
BUN SERPL-MCNC: 15 MG/DL (ref 5–25)
CALCIUM SERPL-MCNC: 9.4 MG/DL (ref 8.3–10.1)
CHLORIDE SERPL-SCNC: 92 MMOL/L (ref 100–108)
CO2 SERPL-SCNC: 30 MMOL/L (ref 21–32)
CREAT SERPL-MCNC: 0.83 MG/DL (ref 0.6–1.3)
CRP SERPL QL: 15.9 MG/L
ERYTHROCYTE [SEDIMENTATION RATE] IN BLOOD: 27 MM/HOUR (ref 0–29)
GFR SERPL CREATININE-BSD FRML MDRD: 73 ML/MIN/1.73SQ M
GLUCOSE SERPL-MCNC: 92 MG/DL (ref 65–140)
POTASSIUM SERPL-SCNC: 4.5 MMOL/L (ref 3.5–5.3)
SODIUM SERPL-SCNC: 130 MMOL/L (ref 136–145)
TSH SERPL DL<=0.05 MIU/L-ACNC: 1.47 UIU/ML (ref 0.36–3.74)

## 2020-11-04 PROCEDURE — 86140 C-REACTIVE PROTEIN: CPT

## 2020-11-04 PROCEDURE — 85652 RBC SED RATE AUTOMATED: CPT

## 2020-11-04 PROCEDURE — 36415 COLL VENOUS BLD VENIPUNCTURE: CPT

## 2020-11-04 PROCEDURE — 80048 BASIC METABOLIC PNL TOTAL CA: CPT

## 2020-11-04 PROCEDURE — 84443 ASSAY THYROID STIM HORMONE: CPT

## 2020-12-03 ENCOUNTER — OFFICE VISIT (OUTPATIENT)
Dept: FAMILY MEDICINE CLINIC | Facility: CLINIC | Age: 65
End: 2020-12-03
Payer: MEDICARE

## 2020-12-03 VITALS
BODY MASS INDEX: 33.06 KG/M2 | HEIGHT: 63 IN | WEIGHT: 186.6 LBS | SYSTOLIC BLOOD PRESSURE: 140 MMHG | DIASTOLIC BLOOD PRESSURE: 80 MMHG | OXYGEN SATURATION: 98 % | RESPIRATION RATE: 18 BRPM | HEART RATE: 76 BPM | TEMPERATURE: 96.7 F

## 2020-12-03 DIAGNOSIS — Z79.52 CURRENT CHRONIC USE OF SYSTEMIC STEROIDS: ICD-10-CM

## 2020-12-03 DIAGNOSIS — E87.1 HYPONATREMIA: ICD-10-CM

## 2020-12-03 DIAGNOSIS — F32.5 MAJOR DEPRESSIVE DISORDER WITH SINGLE EPISODE, IN FULL REMISSION (HCC): ICD-10-CM

## 2020-12-03 DIAGNOSIS — I10 BENIGN ESSENTIAL HYPERTENSION: ICD-10-CM

## 2020-12-03 DIAGNOSIS — J45.30 MILD PERSISTENT ASTHMA WITHOUT COMPLICATION: ICD-10-CM

## 2020-12-03 DIAGNOSIS — Z00.00 MEDICARE ANNUAL WELLNESS VISIT, SUBSEQUENT: ICD-10-CM

## 2020-12-03 DIAGNOSIS — M31.6 TEMPORAL ARTERITIS (HCC): Primary | ICD-10-CM

## 2020-12-03 PROCEDURE — 99214 OFFICE O/P EST MOD 30 MIN: CPT | Performed by: FAMILY MEDICINE

## 2020-12-03 PROCEDURE — G0439 PPPS, SUBSEQ VISIT: HCPCS | Performed by: FAMILY MEDICINE

## 2020-12-03 RX ORDER — ALBUTEROL SULFATE 90 UG/1
2 AEROSOL, METERED RESPIRATORY (INHALATION) EVERY 6 HOURS PRN
Qty: 1 INHALER | Refills: 5
Start: 2020-12-03

## 2020-12-03 RX ORDER — FLUTICASONE PROPIONATE 50 MCG
1 SPRAY, SUSPENSION (ML) NASAL DAILY
Qty: 3 BOTTLE | Refills: 3 | Status: SHIPPED | OUTPATIENT
Start: 2020-12-03 | End: 2021-12-13

## 2020-12-03 RX ORDER — OLMESARTAN MEDOXOMIL 40 MG/1
40 TABLET ORAL DAILY
Qty: 90 TABLET | Refills: 3 | Status: ON HOLD | OUTPATIENT
Start: 2020-12-03 | End: 2020-12-23 | Stop reason: SDUPTHER

## 2020-12-03 RX ORDER — PREDNISONE 1 MG/1
TABLET ORAL
Qty: 200 TABLET | Refills: 5 | Status: SHIPPED | OUTPATIENT
Start: 2020-12-03 | End: 2021-04-08 | Stop reason: SDUPTHER

## 2020-12-21 ENCOUNTER — APPOINTMENT (EMERGENCY)
Dept: RADIOLOGY | Facility: HOSPITAL | Age: 65
DRG: 184 | End: 2020-12-21
Payer: MEDICARE

## 2020-12-21 ENCOUNTER — APPOINTMENT (EMERGENCY)
Dept: CT IMAGING | Facility: HOSPITAL | Age: 65
DRG: 184 | End: 2020-12-21
Payer: MEDICARE

## 2020-12-21 ENCOUNTER — HOSPITAL ENCOUNTER (EMERGENCY)
Facility: HOSPITAL | Age: 65
DRG: 184 | End: 2020-12-21
Attending: EMERGENCY MEDICINE | Admitting: EMERGENCY MEDICINE
Payer: MEDICARE

## 2020-12-21 ENCOUNTER — HOSPITAL ENCOUNTER (INPATIENT)
Facility: HOSPITAL | Age: 65
LOS: 2 days | Discharge: HOME | DRG: 184 | End: 2020-12-23
Attending: SURGERY | Admitting: SURGERY
Payer: MEDICARE

## 2020-12-21 ENCOUNTER — TELEPHONE (OUTPATIENT)
Dept: FAMILY MEDICINE CLINIC | Facility: CLINIC | Age: 65
End: 2020-12-21

## 2020-12-21 VITALS
DIASTOLIC BLOOD PRESSURE: 79 MMHG | RESPIRATION RATE: 20 BRPM | SYSTOLIC BLOOD PRESSURE: 176 MMHG | HEART RATE: 91 BPM | TEMPERATURE: 97.5 F | OXYGEN SATURATION: 98 %
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
## 2020-12-21 DIAGNOSIS — S22.49XA RIB FRACTURES: ICD-10-CM

## 2020-12-21 DIAGNOSIS — I10 BENIGN ESSENTIAL HYPERTENSION: ICD-10-CM

## 2020-12-21 DIAGNOSIS — R42 LIGHTHEADEDNESS: ICD-10-CM

## 2020-12-21 DIAGNOSIS — S42.109A SCAPULAR FRACTURE: ICD-10-CM

## 2020-12-21 DIAGNOSIS — S42.109A SCAPULA FRACTURE: Primary | ICD-10-CM

## 2020-12-21 DIAGNOSIS — S09.90XA CLOSED HEAD INJURY, INITIAL ENCOUNTER: ICD-10-CM
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
## 2020-12-21 PROBLEM — F10.10 CHRONIC ALCOHOL ABUSE: Status: ACTIVE | Noted: 2020-12-21

## 2020-12-21 PROBLEM — S22.42XA FRACTURE OF MULTIPLE RIBS OF LEFT SIDE: Status: ACTIVE | Noted: 2020-12-21

## 2020-12-21 PROBLEM — Z86.79 HISTORY OF HYPERTENSION: Status: ACTIVE | Noted: 2020-12-21

## 2020-12-21 PROBLEM — G89.11 ACUTE PAIN DUE TO TRAUMA: Status: ACTIVE | Noted: 2020-12-21

## 2020-12-21 PROBLEM — Z86.59 HISTORY OF DEPRESSION: Status: ACTIVE | Noted: 2020-12-21

## 2020-12-21 LAB
ALBUMIN SERPL BCP-MCNC: 3.6 G/DL (ref 3.5–5)
ALBUMIN SERPL BCP-MCNC: 3.7 G/DL (ref 3.5–5)
ALP SERPL-CCNC: 44 U/L (ref 46–116)
ALP SERPL-CCNC: 49 U/L (ref 46–116)
ALT SERPL W P-5'-P-CCNC: 32 U/L (ref 12–78)
ALT SERPL W P-5'-P-CCNC: 34 U/L (ref 12–78)
ANION GAP SERPL CALCULATED.3IONS-SCNC: 16 MMOL/L (ref 4–13)
ANION GAP SERPL CALCULATED.3IONS-SCNC: 8 MMOL/L (ref 4–13)
ANION GAP SERPL CALCULATED.3IONS-SCNC: 8 MMOL/L (ref 4–13)
APTT PPP: 24 SECONDS (ref 23–37)
APTT PPP: 26 SECONDS (ref 23–37)
AST SERPL W P-5'-P-CCNC: 33 U/L (ref 5–45)
AST SERPL W P-5'-P-CCNC: 36 U/L (ref 5–45)
ATRIAL RATE: 73 BPM
ATRIAL RATE: 73 BPM
BASOPHILS # BLD AUTO: 0.06 THOUSANDS/ΜL (ref 0–0.1)
BASOPHILS NFR BLD AUTO: 0 % (ref 0–1)
BILIRUB SERPL-MCNC: 0.35 MG/DL (ref 0.2–1)
BILIRUB SERPL-MCNC: 0.5 MG/DL (ref 0.2–1)
BUN SERPL-MCNC: 13 MG/DL (ref 5–25)
BUN SERPL-MCNC: 13 MG/DL (ref 5–25)
BUN SERPL-MCNC: 16 MG/DL (ref 5–25)
CALCIUM SERPL-MCNC: 9 MG/DL (ref 8.3–10.1)
CALCIUM SERPL-MCNC: 9.2 MG/DL (ref 8.3–10.1)
CALCIUM SERPL-MCNC: 9.3 MG/DL (ref 8.3–10.1)
CHLORIDE SERPL-SCNC: 91 MMOL/L (ref 100–108)
CHLORIDE SERPL-SCNC: 93 MMOL/L (ref 100–108)
CHLORIDE SERPL-SCNC: 95 MMOL/L (ref 100–108)
CO2 SERPL-SCNC: 20 MMOL/L (ref 21–32)
CO2 SERPL-SCNC: 25 MMOL/L (ref 21–32)
CO2 SERPL-SCNC: 26 MMOL/L (ref 21–32)
CREAT SERPL-MCNC: 0.68 MG/DL (ref 0.6–1.3)
CREAT SERPL-MCNC: 0.69 MG/DL (ref 0.6–1.3)
CREAT SERPL-MCNC: 0.75 MG/DL (ref 0.6–1.3)
EOSINOPHIL # BLD AUTO: 0.08 THOUSAND/ΜL (ref 0–0.61)
EOSINOPHIL NFR BLD AUTO: 0 % (ref 0–6)
ERYTHROCYTE [DISTWIDTH] IN BLOOD BY AUTOMATED COUNT: 12.4 % (ref 11.6–15.1)
FIBRINOGEN PPP-MCNC: 426 MG/DL (ref 227–495)
GFR SERPL CREATININE-BSD FRML MDRD: 83 ML/MIN/1.73SQ M
GFR SERPL CREATININE-BSD FRML MDRD: 90 ML/MIN/1.73SQ M
GFR SERPL CREATININE-BSD FRML MDRD: 91 ML/MIN/1.73SQ M
GLUCOSE SERPL-MCNC: 109 MG/DL (ref 65–140)
GLUCOSE SERPL-MCNC: 110 MG/DL (ref 65–140)
GLUCOSE SERPL-MCNC: 143 MG/DL (ref 65–140)
HCT VFR BLD AUTO: 39.6 % (ref 34.8–46.1)
HGB BLD-MCNC: 13.3 G/DL (ref 11.5–15.4)
IMM GRANULOCYTES # BLD AUTO: 0.27 THOUSAND/UL (ref 0–0.2)
IMM GRANULOCYTES NFR BLD AUTO: 1 % (ref 0–2)
INR PPP: 0.89 (ref 0.84–1.19)
INR PPP: 0.97 (ref 0.84–1.19)
LYMPHOCYTES # BLD AUTO: 1.09 THOUSANDS/ΜL (ref 0.6–4.47)
LYMPHOCYTES NFR BLD AUTO: 6 % (ref 14–44)
MAGNESIUM SERPL-MCNC: 1.8 MG/DL (ref 1.6–2.6)
MAGNESIUM SERPL-MCNC: 2.1 MG/DL (ref 1.6–2.6)
MCH RBC QN AUTO: 33.6 PG (ref 26.8–34.3)
MCHC RBC AUTO-ENTMCNC: 33.6 G/DL (ref 31.4–37.4)
MCV RBC AUTO: 100 FL (ref 82–98)
MONOCYTES # BLD AUTO: 1.11 THOUSAND/ΜL (ref 0.17–1.22)
MONOCYTES NFR BLD AUTO: 6 % (ref 4–12)
NEUTROPHILS # BLD AUTO: 17.14 THOUSANDS/ΜL (ref 1.85–7.62)
NEUTS SEG NFR BLD AUTO: 87 % (ref 43–75)
NRBC BLD AUTO-RTO: 0 /100 WBCS
P AXIS: 59 DEGREES
P AXIS: 62 DEGREES
PLATELET # BLD AUTO: 252 THOUSANDS/UL (ref 149–390)
PLATELET # BLD AUTO: 338 THOUSANDS/UL (ref 149–390)
PMV BLD AUTO: 8.4 FL (ref 8.9–12.7)
PMV BLD AUTO: 8.6 FL (ref 8.9–12.7)
POTASSIUM SERPL-SCNC: 4.2 MMOL/L (ref 3.5–5.3)
POTASSIUM SERPL-SCNC: 4.2 MMOL/L (ref 3.5–5.3)
POTASSIUM SERPL-SCNC: 4.6 MMOL/L (ref 3.5–5.3)
PR INTERVAL: 176 MS
PR INTERVAL: 178 MS
PROT SERPL-MCNC: 7.1 G/DL (ref 6.4–8.2)
PROT SERPL-MCNC: 7.3 G/DL (ref 6.4–8.2)
PROTHROMBIN TIME: 11.9 SECONDS (ref 11.6–14.5)
PROTHROMBIN TIME: 12.9 SECONDS (ref 11.6–14.5)
QRS AXIS: 70 DEGREES
QRS AXIS: 71 DEGREES
QRSD INTERVAL: 76 MS
QRSD INTERVAL: 82 MS
QT INTERVAL: 390 MS
QT INTERVAL: 392 MS
QTC INTERVAL: 429 MS
QTC INTERVAL: 431 MS
RBC # BLD AUTO: 3.96 MILLION/UL (ref 3.81–5.12)
SODIUM SERPL-SCNC: 127 MMOL/L (ref 136–145)
SODIUM SERPL-SCNC: 127 MMOL/L (ref 136–145)
SODIUM SERPL-SCNC: 128 MMOL/L (ref 136–145)
T WAVE AXIS: 32 DEGREES
T WAVE AXIS: 38 DEGREES
THROMBIN TIME: 15.6 SECONDS (ref 14.7–18.4)
TROPONIN I SERPL-MCNC: <0.02 NG/ML
TSH SERPL DL<=0.05 MIU/L-ACNC: 1.05 UIU/ML (ref 0.36–3.74)
VENTRICULAR RATE: 73 BPM
VENTRICULAR RATE: 73 BPM
WBC # BLD AUTO: 19.75 THOUSAND/UL (ref 4.31–10.16)

## 2020-12-21 PROCEDURE — 80053 COMPREHEN METABOLIC PANEL: CPT | Performed by: PHYSICIAN ASSISTANT

## 2020-12-21 PROCEDURE — 80048 BASIC METABOLIC PNL TOTAL CA: CPT | Performed by: PHYSICIAN ASSISTANT

## 2020-12-21 PROCEDURE — 85730 THROMBOPLASTIN TIME PARTIAL: CPT | Performed by: PHYSICIAN ASSISTANT

## 2020-12-21 PROCEDURE — G1004 CDSM NDSC: HCPCS

## 2020-12-21 PROCEDURE — 71260 CT THORAX DX C+: CPT

## 2020-12-21 PROCEDURE — 93005 ELECTROCARDIOGRAM TRACING: CPT

## 2020-12-21 PROCEDURE — 36415 COLL VENOUS BLD VENIPUNCTURE: CPT

## 2020-12-21 PROCEDURE — 85049 AUTOMATED PLATELET COUNT: CPT | Performed by: PHYSICIAN ASSISTANT

## 2020-12-21 PROCEDURE — 83735 ASSAY OF MAGNESIUM: CPT | Performed by: PHYSICIAN ASSISTANT

## 2020-12-21 PROCEDURE — 99284 EMERGENCY DEPT VISIT MOD MDM: CPT | Performed by: PHYSICIAN ASSISTANT

## 2020-12-21 PROCEDURE — 93010 ELECTROCARDIOGRAM REPORT: CPT | Performed by: INTERNAL MEDICINE

## 2020-12-21 PROCEDURE — 70450 CT HEAD/BRAIN W/O DYE: CPT

## 2020-12-21 PROCEDURE — 73060 X-RAY EXAM OF HUMERUS: CPT

## 2020-12-21 PROCEDURE — 85025 COMPLETE CBC W/AUTO DIFF WBC: CPT | Performed by: EMERGENCY MEDICINE

## 2020-12-21 PROCEDURE — 84484 ASSAY OF TROPONIN QUANT: CPT | Performed by: EMERGENCY MEDICINE

## 2020-12-21 PROCEDURE — 99222 1ST HOSP IP/OBS MODERATE 55: CPT | Performed by: SURGERY

## 2020-12-21 PROCEDURE — 84443 ASSAY THYROID STIM HORMONE: CPT | Performed by: PHYSICIAN ASSISTANT

## 2020-12-21 PROCEDURE — 71045 X-RAY EXAM CHEST 1 VIEW: CPT

## 2020-12-21 PROCEDURE — 85384 FIBRINOGEN ACTIVITY: CPT | Performed by: PHYSICIAN ASSISTANT

## 2020-12-21 PROCEDURE — 74177 CT ABD & PELVIS W/CONTRAST: CPT

## 2020-12-21 PROCEDURE — 80053 COMPREHEN METABOLIC PANEL: CPT | Performed by: EMERGENCY MEDICINE

## 2020-12-21 PROCEDURE — 73030 X-RAY EXAM OF SHOULDER: CPT

## 2020-12-21 PROCEDURE — 96374 THER/PROPH/DIAG INJ IV PUSH: CPT

## 2020-12-21 PROCEDURE — 85610 PROTHROMBIN TIME: CPT | Performed by: PHYSICIAN ASSISTANT

## 2020-12-21 PROCEDURE — 72125 CT NECK SPINE W/O DYE: CPT

## 2020-12-21 PROCEDURE — 99285 EMERGENCY DEPT VISIT HI MDM: CPT

## 2020-12-21 PROCEDURE — 85670 THROMBIN TIME PLASMA: CPT | Performed by: PHYSICIAN ASSISTANT

## 2020-12-21 PROCEDURE — 96360 HYDRATION IV INFUSION INIT: CPT

## 2020-12-21 RX ORDER — METOPROLOL SUCCINATE 100 MG/1
100 TABLET, EXTENDED RELEASE ORAL DAILY
Status: DISCONTINUED | OUTPATIENT
Start: 2020-12-22 | End: 2020-12-23 | Stop reason: HOSPADM

## 2020-12-21 RX ORDER — FENTANYL CITRATE 50 UG/ML
1 INJECTION, SOLUTION INTRAMUSCULAR; INTRAVENOUS ONCE
Status: COMPLETED | OUTPATIENT
Start: 2020-12-21 | End: 2020-12-21

## 2020-12-21 RX ORDER — FENTANYL CITRATE 50 UG/ML
25 INJECTION, SOLUTION INTRAMUSCULAR; INTRAVENOUS ONCE
Status: COMPLETED | OUTPATIENT
Start: 2020-12-21 | End: 2020-12-21

## 2020-12-21 RX ORDER — AMLODIPINE BESYLATE 5 MG/1
5 TABLET ORAL
Status: DISCONTINUED | OUTPATIENT
Start: 2020-12-23 | End: 2020-12-23 | Stop reason: HOSPADM

## 2020-12-21 RX ORDER — METHOCARBAMOL 500 MG/1
500 TABLET, FILM COATED ORAL EVERY 6 HOURS SCHEDULED
Status: DISCONTINUED | OUTPATIENT
Start: 2020-12-21 | End: 2020-12-23 | Stop reason: HOSPADM

## 2020-12-21 RX ORDER — FOLIC ACID 1 MG/1
1 TABLET ORAL DAILY
Status: DISCONTINUED | OUTPATIENT
Start: 2020-12-21 | End: 2020-12-23 | Stop reason: HOSPADM

## 2020-12-21 RX ORDER — CHLORAL HYDRATE 500 MG
1000 CAPSULE ORAL DAILY
Status: DISCONTINUED | OUTPATIENT
Start: 2020-12-22 | End: 2020-12-23 | Stop reason: HOSPADM

## 2020-12-21 RX ORDER — ONDANSETRON 2 MG/ML
1 INJECTION INTRAMUSCULAR; INTRAVENOUS ONCE
Status: COMPLETED | OUTPATIENT
Start: 2020-12-21 | End: 2020-12-21

## 2020-12-21 RX ORDER — ACETAMINOPHEN 325 MG/1
975 TABLET ORAL EVERY 8 HOURS SCHEDULED
Status: DISCONTINUED | OUTPATIENT
Start: 2020-12-21 | End: 2020-12-23 | Stop reason: HOSPADM

## 2020-12-21 RX ORDER — GABAPENTIN 300 MG/1
300 CAPSULE ORAL
Status: DISCONTINUED | OUTPATIENT
Start: 2020-12-21 | End: 2020-12-23 | Stop reason: HOSPADM

## 2020-12-21 RX ORDER — ONDANSETRON 2 MG/ML
4 INJECTION INTRAMUSCULAR; INTRAVENOUS EVERY 6 HOURS PRN
Status: DISCONTINUED | OUTPATIENT
Start: 2020-12-21 | End: 2020-12-23 | Stop reason: HOSPADM

## 2020-12-21 RX ORDER — HYDROMORPHONE HCL/PF 1 MG/ML
0.2 SYRINGE (ML) INJECTION EVERY 4 HOURS PRN
Status: DISCONTINUED | OUTPATIENT
Start: 2020-12-21 | End: 2020-12-23 | Stop reason: HOSPADM

## 2020-12-21 RX ORDER — ALBUTEROL SULFATE 90 UG/1
2 AEROSOL, METERED RESPIRATORY (INHALATION) EVERY 6 HOURS PRN
Status: DISCONTINUED | OUTPATIENT
Start: 2020-12-21 | End: 2020-12-23 | Stop reason: HOSPADM

## 2020-12-21 RX ORDER — AMLODIPINE BESYLATE 2.5 MG/1
2.5 TABLET ORAL
Status: DISCONTINUED | OUTPATIENT
Start: 2020-12-22 | End: 2020-12-23 | Stop reason: HOSPADM

## 2020-12-21 RX ORDER — FLUTICASONE PROPIONATE 50 MCG
1 SPRAY, SUSPENSION (ML) NASAL DAILY
Status: DISCONTINUED | OUTPATIENT
Start: 2020-12-22 | End: 2020-12-23 | Stop reason: HOSPADM

## 2020-12-21 RX ORDER — SPIRONOLACTONE 50 MG/1
50 TABLET, FILM COATED ORAL DAILY
Status: DISCONTINUED | OUTPATIENT
Start: 2020-12-22 | End: 2020-12-23 | Stop reason: HOSPADM

## 2020-12-21 RX ORDER — OXYCODONE HYDROCHLORIDE 5 MG/1
5 TABLET ORAL EVERY 4 HOURS PRN
Status: DISCONTINUED | OUTPATIENT
Start: 2020-12-21 | End: 2020-12-23 | Stop reason: HOSPADM

## 2020-12-21 RX ORDER — THIAMINE MONONITRATE (VIT B1) 100 MG
100 TABLET ORAL DAILY
Status: DISCONTINUED | OUTPATIENT
Start: 2020-12-21 | End: 2020-12-23 | Stop reason: HOSPADM

## 2020-12-21 RX ORDER — OXYCODONE HYDROCHLORIDE 5 MG/1
2.5 TABLET ORAL EVERY 4 HOURS PRN
Status: DISCONTINUED | OUTPATIENT
Start: 2020-12-21 | End: 2020-12-23 | Stop reason: HOSPADM

## 2020-12-21 RX ADMIN — THIAMINE HCL TAB 100 MG 100 MG: 100 TAB at 14:38

## 2020-12-21 RX ADMIN — METHOCARBAMOL 500 MG: 500 TABLET ORAL at 20:00

## 2020-12-21 RX ADMIN — SODIUM CHLORIDE 500 ML: 0.9 INJECTION, SOLUTION INTRAVENOUS at 06:53

## 2020-12-21 RX ADMIN — ENOXAPARIN SODIUM 30 MG: 30 INJECTION SUBCUTANEOUS at 14:40

## 2020-12-21 RX ADMIN — OXYCODONE HYDROCHLORIDE 5 MG: 5 TABLET ORAL at 14:38

## 2020-12-21 RX ADMIN — ACETAMINOPHEN 975 MG: 325 TABLET, FILM COATED ORAL at 14:39

## 2020-12-21 RX ADMIN — METHOCARBAMOL 500 MG: 500 TABLET ORAL at 14:38

## 2020-12-21 RX ADMIN — GABAPENTIN 300 MG: 300 CAPSULE ORAL at 22:18

## 2020-12-21 RX ADMIN — OXYCODONE HYDROCHLORIDE 5 MG: 5 TABLET ORAL at 22:18

## 2020-12-21 RX ADMIN — FOLIC ACID 1 MG: 1 TABLET ORAL at 14:38

## 2020-12-21 RX ADMIN — Medication 1 TABLET: at 14:38

## 2020-12-21 RX ADMIN — ACETAMINOPHEN 975 MG: 325 TABLET, FILM COATED ORAL at 22:18

## 2020-12-21 RX ADMIN — ENOXAPARIN SODIUM 30 MG: 30 INJECTION SUBCUTANEOUS at 20:00

## 2020-12-21 RX ADMIN — IOHEXOL 100 ML: 350 INJECTION, SOLUTION INTRAVENOUS at 07:54

## 2020-12-21 RX ADMIN — FENTANYL CITRATE 25 MCG: 50 INJECTION INTRAMUSCULAR; INTRAVENOUS at 08:01

## 2020-12-21 NOTE — CASE MANAGEMENT
Pt is NOT a Bundle  Pt is NOT a 30 Day Readmission    CM met with pt to discuss the role of CM  Pt lives with her  in a 2 story home which has 1STE and 13 steps inside  Pt's bedroom and bathroom are on the 2nd floor with a tub/shower and raised toilet  Pt also has a 1/2 bath on the 1st floor with a raised toilet  Pt drives, is retired (former ) and is fully independent PTA with her ADL/IADLs  Pt's had 3 falls in the last 6 months  Pt enjoys swimming, hiking and watching TV  Pt owns no DME or living will  Pt's pharmacy is [facility] in [place]  Pt reports no hx of mental health, substance abuse, IP rehab or VNA  Pt's  will transport home  CM will appreciate therapy recommendations when appropriate  CM reviewed d/c planning process including the following: identifying help at home, patient preference for d/c planning needs, Discharge Lounge, [facility] Meds to Bed program, availability of treatment team to discuss questions or concerns patient and/or family may have regarding understanding medications and recognizing signs and symptoms once discharged  CM also encouraged patient to follow up with all recommended appointments after discharge  Patient advised of importance for patient and family to participate in managing patients medical well being

## 2020-12-21 NOTE — ASSESSMENT & PLAN NOTE
-patient states she drinks at least 1 alcohol beverage prior to bed every evening  -denies history of alcohol withdrawal  -will place on CIWA protocol  -will place on folic acid, thiamine, and multivitamin  -will monitor for signs and symptoms of alcohol withdrawal

## 2020-12-21 NOTE — ASSESSMENT & PLAN NOTE
-patient appears to have chronic hyponatremia likely related to alcohol use  -sodium run typically from 129-132  Sodium level today is 127   -will place on fluid restriction and repeat in a m

## 2020-12-21 NOTE — ASSESSMENT & PLAN NOTE
-nonweightbearing to the left upper extremity pending orthopedic evaluation  - Possible L humeral neck fracture as well, orthopedics to evaluate   - sling to Left upper extremity  -pain control  -PT and OT

## 2020-12-21 NOTE — CONSULTS
Orthopedics   Bruna GUTIERREZEvgenykeaton 77 y o  female MRN: [id number]  Unit/Bed#: [facility] 607-01      Chief Complaint:   Left shoulder pain    HPI:  77 y o  female complaining of left shoulder s/p fall  Patient was brought to [place] as a trauma and found to have multiple left rib fractures and hyponatremic  The patient states she has no numbness or tingling of the left upper extremity  She is right hand dominant      Review Of Systems:   · Skin: Normal  · Neuro: See HPI  · Musculoskeletal: See HPI  · 14 point review of systems negative except as stated above     Past Medical History:   Past Medical History:   Diagnosis Date    Anxiety state 12/19/2014    Benign essential hypertension 1/14/2010    Chronic rhinitis 1/12/2012    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 hair loss 2/4/2020    Hyponatremia 10/2/2020    Major depressive disorder with single episode, in full remission ([facility] [address] ) 10/10/2019    Mild persistent asthma without complication 6/8/1267    Right chronic serous otitis media 11/7/2017    Added automatically from request for surgery [id number]    Temporal arteritis ([facility] [address] ) 9/4/2020       Past Surgical History:   Past Surgical History:   Procedure Laterality Date    LAPAROSCOPY      X2 for endometriosis    KS TEMPORAL ARTERY LIGATN OR BX Left 8/14/2020    Procedure: TEMPORAL ARTERY BIOPSY;  Surgeon: Elva Alfaro MD;  Location: [address] [place] [address];  Service: General       Family History:  Family history reviewed and non-contributory  Family History   Problem Relation Age of Onset    Coronary artery disease Mother     Coronary artery disease Father     No Known Problems Maternal Grandmother     No Known Problems Maternal Grandfather     Leukemia Paternal Grandmother 71    No Known Problems Paternal Grandfather     No Known Problems Son     No Known Problems Son        Social History:  Social History     Socioeconomic History    Marital status: /Civil Union     Spouse name: None    Number of children: 2    Years of education: None    Highest education level: None   Occupational History    None   Social Needs    Financial resource strain: None    Food insecurity     Worry: None     Inability: None    Transportation needs     Medical: None     Non-medical: None   Tobacco Use    Smoking status: Never Smoker    Smokeless tobacco: Never Used   Substance and Sexual Activity    Alcohol use: Yes     Frequency: 4 or more times a week     Drinks per session: 1 or 2     Binge frequency: Never     Comment: socially    Drug use: Never    Sexual activity: Not Currently   Lifestyle    Physical activity     Days per week: None     Minutes per session: None    Stress: None   Relationships    Social connections     Talks on phone: None     Gets together: None     Attends Hindu service: None     Active member of club or organization: None     Attends meetings of clubs or organizations: None     Relationship status: None    Intimate partner violence     Fear of current or ex partner: None     Emotionally abused: None     Physically abused: None     Forced sexual activity: None   Other Topics Concern    None   Social History Narrative     since 0  Two sons  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, gardening, walking, needle work, painting, writing books         Allergies:   No Known Allergies        Labs:  0   Lab Value Date/Time    HCT 39 6 12/21/2020 0614    HCT 39 1 08/07/2020 1056    HGB 13 3 12/21/2020 0614    HGB 13 0 08/07/2020 1056    INR 0 97 12/21/2020 1254    WBC 19 75 (H) 12/21/2020 0614    WBC 6 72 08/07/2020 1056    ESR 27 11/04/2020 1047    CRP 15 9 (H) 11/04/2020 1047       Meds:    Current Facility-Administered Medications:     acetaminophen (TYLENOL) tablet 975 mg, 975 mg, Oral, Q8H [address], Georgette Montalvo PA-C, 975 mg at 12/21/20 1439    albuterol (PROVENTIL HFA,VENTOLIN HFA) inhaler 2 puff, 2 puff, Inhalation, Q6H PRN, Vince Thompson PA-C   [START ON 12/22/2020] amLODIPine (NORVASC) tablet 2 5 mg, 2 5 mg, Oral, Once per day on Sun Tue Thu Sat, Jonetta Buerger, PA-C    [START ON 12/23/2020] amLODIPine (NORVASC) tablet 5 mg, 5 mg, Oral, Once per day on Mon Wed Fri, Georgette Montalvo PA-C    enoxaparin (LOVENOX) subcutaneous injection 30 mg, 30 mg, Subcutaneous, Q12H [address], Jonetta Buerger, PA-C, 30 mg at 12/21/20 1440    [START ON 12/22/2020] fish oil capsule 1,000 mg, 1,000 mg, Oral, Daily, Jonetta Buerger, PA-C    [START ON 12/22/2020] fluticasone (FLONASE) 50 mcg/act nasal spray 1 spray, 1 spray, Nasal, Daily, Jonetta Buerger, PA-C    folic acid (FOLVITE) tablet 1 mg, 1 mg, Oral, Daily, Jonetta Buerger, PA-C, 1 mg at 12/21/20 1438    gabapentin (NEURONTIN) capsule 300 mg, 300 mg, Oral, HS, Jonetta Buerger, PA-C    HYDROmorphone (DILAUDID) injection 0 2 mg, 0 2 mg, Intravenous, Q4H PRN, Jonetta Buerger, PA-C    methocarbamol (ROBAXIN) tablet 500 mg, 500 mg, Oral, Q6H [address], Jonetta Buerger, PA-C, 500 mg at 12/21/20 1438    [START ON 12/22/2020] metoprolol succinate (TOPROL-XL) 24 hr tablet 100 mg, 100 mg, Oral, Daily, Jonetta Buerger, PA-C    multivitamin-minerals (CENTRUM) tablet 1 tablet, 1 tablet, Oral, Daily, Jonetta Buerger, PA-C, 1 tablet at 12/21/20 1438    ondansetron (ZOFRAN) injection 4 mg, 4 mg, Intravenous, Q6H PRN, Jonetta Buerger, PA-C    oxyCODONE (ROXICODONE) IR tablet 2 5 mg, 2 5 mg, Oral, Q4H PRN, Jonetta Buerger, PA-C    oxyCODONE (ROXICODONE) IR tablet 5 mg, 5 mg, Oral, Q4H PRN, Jonetta Buerger, PA-C, 5 mg at 12/21/20 1438    [START ON 12/22/2020] predniSONE tablet 12 mg, 12 mg, Oral, Daily, Jonetta Buerger, PA-C    [START ON 12/22/2020] sertraline (ZOLOFT) tablet 50 mg, 50 mg, Oral, Daily, Jonetta Buerger, PA-C    [START ON 12/22/2020] spironolactone (ALDACTONE) tablet 50 mg, 50 mg, Oral, Daily, Jonetta Buerger, PA-C    thiamine (VITAMIN B1) tablet 100 mg, 100 mg, Oral, Daily, Jonetta Buerger, PA-C, 100 mg at 12/21/20 1438    Blood Culture:   No results found for: BLOODCX    Wound Culture:   No results found for: WOUNDCULT    Ins and Outs:  No intake/output data recorded  Physical Exam:   /87   Pulse 96   Temp (!) 100 9 °F (38 3 °C) (Oral)   Resp 17   Ht 5' 3" (1 6 m)   Wt 85 7 kg (189 lb)   SpO2 95%   BMI 33 48 kg/m²   Gen: Alert and oriented to person, place, time  HEENT: EOMI, eyes clear, moist mucus membranes, hearing intact  Respiratory: Bilateral chest rise  No audible wheezing found  Cardiovascular: Regular Rate and Rhythm  Abdomen: soft nontender/nondistended  Musculoskeletal: left upper extremity  · Skin intact with ecchymosis and swellin  · TTP left scapula  · Pain with shoulder ROM  · SILT m/r/u    · Motor intact ain/pin/m/r/u  · 2+ rad pulse  ·     Tertiary: no tenderness over all other joints/long bones as except already stated  Radiology:   I personally reviewed the films  X-ray left shoulder: comminuted fracture of the left scapular body  No evidence of fracture of the humerus     _*_*_*_*_*_*_*_*_*_*_*_*_*_*_*_*_*_*_*_*_*_*_*_*_*_*_*_*_*_*_*_*_*_*_*_*_*_*_*_*_*    Assessment:  77 y  o female with left scapular body fracture  Patient may be managed nonoperatively in a sling  Plan:   · NWB LUE  · PT/OT  · Pain control per primary team  · DVT ppx per primary team  · Body mass index is 33 48 kg/m²  mildly obese  Recommend behavior modifications and nutrition    · Dispo: ortho will follow    Wenceslao Perera MD

## 2020-12-21 NOTE — ASSESSMENT & PLAN NOTE
-multimodal analgesic regimen including scheduled Tylenol, gabapentin, methocarbamol    Oxycodone and IV Dilaudid available for moderate to severe pain in breakthrough pain as well, respectively     -bowel regimen ordered as well

## 2020-12-21 NOTE — ED NOTES
Patient to be transferred to [facility] AND [facility] ED   Accepting Dr Riki Umanzor   Number for report [phone]  Nancy Horvath   At [address] [place] [address], [address] [place] [address]  12/21/20 2265

## 2020-12-21 NOTE — ED NOTES
pts BP reads 182/77, pt states she did not take her morning BP medication   Provider notified      Blease   12/21/20 2247

## 2020-12-21 NOTE — PLAN OF CARE
Problem: PAIN - ADULT  Goal: Verbalizes/displays adequate comfort level or baseline comfort level  Description: Interventions:  - Encourage patient to monitor pain and request assistance  - Assess pain using appropriate pain scale  - Administer analgesics based on type and severity of pain and evaluate response  - Implement non-pharmacological measures as appropriate and evaluate response  - Consider cultural and social influences on pain and pain management  - Notify physician/advanced practitioner if interventions unsuccessful or patient reports new pain  Outcome: Progressing     Problem: INFECTION - ADULT  Goal: Absence or prevention of progression during hospitalization  Description: INTERVENTIONS:  - Assess and monitor for signs and symptoms of infection  - Monitor lab/diagnostic results  - Monitor all insertion sites, i e  indwelling lines, tubes, and drains  - Monitor endotracheal if appropriate and nasal secretions for changes in amount and color  - [place] appropriate cooling/warming therapies per order  - Administer medications as ordered  - Instruct and encourage patient and family to use good hand hygiene technique  - Identify and instruct in appropriate isolation precautions for identified infection/condition  Outcome: Progressing  Goal: Absence of fever/infection during neutropenic period  Description: INTERVENTIONS:  - Monitor WBC    Outcome: Progressing     Problem: SAFETY ADULT  Goal: Patient will remain free of falls  Description: INTERVENTIONS:  - Assess patient frequently for physical needs  -  Identify cognitive and physical deficits and behaviors that affect risk of falls    -  [place] fall precautions as indicated by assessment   - Educate patient/family on patient safety including physical limitations  - Instruct patient to call for assistance with activity based on assessment  - Modify environment to reduce risk of injury  - Consider OT/PT consult to assist with strengthening/mobility  Outcome: Progressing  Goal: Maintain or return to baseline ADL function  Description: INTERVENTIONS:  -  Assess patient's ability to carry out ADLs; assess patient's baseline for ADL function and identify physical deficits which impact ability to perform ADLs (bathing, care of mouth/teeth, toileting, grooming, dressing, etc )  - Assess/evaluate cause of self-care deficits   - Assess range of motion  - Assess patient's mobility; develop plan if impaired  - Assess patient's need for assistive devices and provide as appropriate  - Encourage maximum independence but intervene and supervise when necessary  - Involve family in performance of ADLs  - Assess for home care needs following discharge   - Consider OT consult to assist with ADL evaluation and planning for discharge  - Provide patient education as appropriate  Outcome: Progressing  Goal: Maintain or return mobility status to optimal level  Description: INTERVENTIONS:  - Assess patient's baseline mobility status (ambulation, transfers, stairs, etc )    - Identify cognitive and physical deficits and behaviors that affect mobility  - Identify mobility aids required to assist with transfers and/or ambulation (gait belt, sit-to-stand, lift, walker, cane, etc )  - [place] fall precautions as indicated by assessment  - Record patient progress and toleration of activity level on Mobility SBAR; progress patient to next Phase/Stage  - Instruct patient to call for assistance with activity based on assessment  - Consider rehabilitation consult to assist with strengthening/weightbearing, etc   Outcome: Progressing     Problem: DISCHARGE PLANNING  Goal: Discharge to home or other facility with appropriate resources  Description: INTERVENTIONS:  - Identify barriers to discharge w/patient and caregiver  - Arrange for needed discharge resources and transportation as appropriate  - Identify discharge learning needs (meds, wound care, etc )  - Arrange for interpretive services to assist at discharge as needed  - Refer to Case Management Department for coordinating discharge planning if the patient needs post-hospital services based on physician/advanced practitioner order or complex needs related to functional status, cognitive ability, or social support system  Outcome: Progressing     Problem: Knowledge Deficit  Goal: Patient/family/caregiver demonstrates understanding of disease process, treatment plan, medications, and discharge instructions  Description: Complete learning assessment and assess knowledge base    Interventions:  - Provide teaching at level of understanding  - Provide teaching via preferred learning methods  Outcome: Progressing

## 2020-12-21 NOTE — ED NOTES
Pt complaining of pain and requesting pain medication   Provider notified      Luciano Barclay  12/21/20 5079

## 2020-12-21 NOTE — H&P
H&P- Braden Velasquez 1955, 77 y o  female MRN: [id number]    Unit/Bed#: [facility] Encounter: [id number]    Primary Care Provider: Milo Whipple MD   Date and time admitted to hospital: 12/21/2020 11:53 AM        Fall  Assessment & Plan  -Staining below stated injuries  -patient became dizzy prior to falling which he thinks is related to a antihypertensive that she has been taking for the last several months before bed  She states that after he takes medications she often times feels dizzy and lightheaded   -geriatrics evaluation pending  -PT and OT evaluations are pending as well    * Fracture of multiple ribs of left side  Assessment & Plan  -wall for performance rib fractures  -on CT scan, no evidence of hemothorax or pneumothorax  Will repeat chest x-ray tomorrow morning to evaluate for delayed pneumothorax or hemothorax   -rib fracture protocol with multimodal analgesic regimen  -IS/airway clearance protocol    Scapula fracture  Assessment & Plan  -nonweightbearing to the left upper extremity pending orthopedic evaluation  - Possible L humeral neck fracture as well, orthopedics to evaluate   - sling to Left upper extremity  -pain control  -PT and OT    Acute pain due to trauma  Assessment & Plan  -multimodal analgesic regimen including scheduled Tylenol, gabapentin, methocarbamol  Oxycodone and IV Dilaudid available for moderate to severe pain in breakthrough pain as well, respectively     -bowel regimen ordered as well    Hyponatremia  Assessment & Plan  -patient appears to have chronic hyponatremia likely related to alcohol use  -sodium run typically from 129-132  Sodium level today is 127   -will place on fluid restriction and repeat in a m      Chronic alcohol abuse  Assessment & Plan  -patient states she drinks at least 1 alcohol beverage prior to bed every evening  -denies history of alcohol withdrawal  -will place on CIWA protocol  -will place on folic acid, thiamine, and multivitamin  -will monitor for signs and symptoms of alcohol withdrawal    History of hypertension  Assessment & Plan  -resume home antihypertensives including amlodipine 5 mg on MWF and 2 5 mg all other days, Toprol  mg daily, Spironlactone 50 mg qhs  - will hold olmesartan 40 mg daily in the evening as this is the inciting medication which she feels may be contributing to her dizziness in the evening  I did explain that the combination of her ACE, spironlactone and alcohol in the evening is likely contributing as well  I recommended she not drink alcohol in the evening with taking her anti h hypertensive  Will monitor BP off the ACE for now  - f/u with PCP    History of depression  Assessment & Plan  - resume home zoloft  - per patient, this is well controlled      Assessment/Plan   Trauma Alert: Evaluation  Model of Arrival: Ambulance  Trauma Team: Attending Saumya Frank and [address] [place] [address],  [address]  Consultants: Orthopaedics: Marco Se Jeannette Kumari  Time Called [phone], Returned call: Yes immediately responded and gave recs  Will evaluate soon  Chief Complaint: "I'm having pain in my left shoulder and back"    History of Present Illness   HPI:  Ama Velasquez is a 77 y o  female who presents following fall down stairs sustaining left 3rd through 7th rib fractures a left scapular fracture  She was initially seen at [facility] and transferred the [facility] after finding the injuries  She states she will walking upstairs and everything went black causing her to fall  She attributes this to an antihypertensive, olmesartan, which she has been taking for the last several months in the evening  She also admits to drinking alcohol on a nightly basis and wonders if this may be contributing as well  She is having pain in her left chest wall posteriorly as well as in her left shoulder blade  She did strike her head  She does not take any blood thinners or anti-platelet agent    Her  is present with her at bedside  Mechanism:Fall    Review of Systems   Constitutional: Negative  HENT: Negative  Eyes: Negative  Respiratory: Negative  Cardiovascular: Negative  Gastrointestinal: Negative  Endocrine: Negative  Genitourinary: Negative  Musculoskeletal:        + L shoulder and chest wall pain posteriorly  + difficulty lifting left arm due to pain    Skin: Negative  Allergic/Immunologic: Negative  Neurological: Positive for dizziness, syncope and headaches  Negative for weakness and numbness  Hematological: Negative  Psychiatric/Behavioral: Negative  12-point, complete review of systems was reviewed and negative except as stated above         Historical Information     Past Medical History:   Diagnosis Date    Anxiety state 12/19/2014    Benign essential hypertension 1/14/2010    Chronic rhinitis 1/12/2012    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 hair loss 2/4/2020    Hyponatremia 10/2/2020    Major depressive disorder with single episode, in full remission ([facility] [address] ) 10/10/2019    Mild persistent asthma without complication 6/9/5133    Right chronic serous otitis media 11/7/2017    Added automatically from request for surgery [id number]    Temporal arteritis ([facility] [address] ) 9/4/2020     Past Surgical History:   Procedure Laterality Date    LAPAROSCOPY      X2 for endometriosis    OR TEMPORAL ARTERY LIGATN OR BX Left 8/14/2020    Procedure: TEMPORAL ARTERY BIOPSY;  Surgeon: John Mcintosh MD;  Location: [address] [place] [address] OR;  Service: General     Social History   Social History     Substance and Sexual Activity   Alcohol Use Yes    Frequency: 4 or more times a week    Drinks per session: 1 or 2    Binge frequency: Never    Comment: socially     Social History     Substance and Sexual Activity   Drug Use Never     Social History     Tobacco Use   Smoking Status Never Smoker   Smokeless Tobacco Never Used     E-Cigarette/Vaping    E-Cigarette Use Never User      E-Cigarette/Vaping Substances     Immunization History Administered Date(s) Administered    INFLUENZA 10/03/2016    Influenza Split High Dose Preservative Free IM 10/15/2018    Influenza, high dose seasonal 0 7 mL 2019, 10/02/2020    Pneumococcal Conjugate 13-Valent 04/10/2018    Pneumococcal Polysaccharide PPV23 10/06/2017, 2019    Tdap 2012, 10/15/2018    Zoster 2013     Last Tetanus: 2018  Family History: Non-contributory      Meds/Allergies   all current active meds have been reviewed and PTA meds:   Prior to Admission Medications   Prescriptions Last Dose Informant Patient Reported? Taking?    Aluminum & Magnesium Hydroxide (MAGNESIUM-ALUMINUM PO)  Self Yes No   Sig: Take by mouth   Omega-3 1000 MG CAPS  Self Yes No   Sig: Take by mouth   albuterol (PROVENTIL HFA,VENTOLIN HFA) 90 mcg/act inhaler   No No   Sig: Inhale 2 puffs every 6 (six) hours as needed for wheezing or shortness of breath   alendronate (Fosamax) 70 mg tablet  Self No No   Sig: Take 1 tablet (70 mg total) by mouth every 7 days   amLODIPine (NORVASC) 5 mg tablet  Self No No   Sig: One tablet MWF, 1/2 tablet other days   fluticasone (FLONASE) 50 mcg/act nasal spray   No No   Si spray into each nostril daily   metoprolol succinate (TOPROL-XL) 100 mg 24 hr tablet  Self No No   Sig: Take 1 tablet (100 mg total) by mouth daily   olmesartan (BENICAR) 40 mg tablet   No No   Sig: Take 1 tablet (40 mg total) by mouth daily   predniSONE 1 mg tablet   No No   Sig: Use as directed   predniSONE 5 mg tablet  Self No No   Sig: Use 1-3 tablets daily or as directed   sertraline (ZOLOFT) 50 mg tablet  Self No No   Sig: Take 1 tablet (50 mg total) by mouth daily   spironolactone (ALDACTONE) 50 mg tablet  Self No No   Sig: Take 1 tablet (50 mg total) by mouth daily      Facility-Administered Medications: None       No Known Allergies      PHYSICAL EXAM      Objective   Vitals:   First set: Temperature: 99 1 °F (37 3 °C) (20 1200)  Pulse: 84 (20 1200)  Respirations: 16 (12/21/20 1200)  Blood Pressure: (!) 180/99 (12/21/20 1200)    Primary Survey:   (A) Airway:  Intact  (B) Breathing:  Equal bilaterally  (C) Circulation: Pulses:   normal  (D) Disabliity:  GCS Total:  15  (E) Expose:  Completed    Secondary Survey: (Click on Physical Exam tab above)  Physical Exam  Constitutional:       General: She is not in acute distress  Appearance: Normal appearance  She is not ill-appearing or toxic-appearing  HENT:      Head: Normocephalic  Nose: Nose normal       Mouth/Throat:      Mouth: Mucous membranes are moist    Eyes:      Pupils: Pupils are equal, round, and reactive to light  Neck:      Musculoskeletal: Normal range of motion and neck supple  No muscular tenderness  Cardiovascular:      Rate and Rhythm: Normal rate and regular rhythm  Pulses: Normal pulses  Pulmonary:      Effort: Pulmonary effort is normal  No respiratory distress  Breath sounds: Normal breath sounds  Abdominal:      General: Abdomen is flat  There is no distension  Palpations: Abdomen is soft  Tenderness: There is no abdominal tenderness  There is no guarding or rebound  Musculoskeletal:      Comments: +left chest wall tenderness to palpation posterolaterally and tenderness over the left scapula  No cervical spine tenderness  No midline spine tenderness  Skin:     General: Skin is warm and dry  Capillary Refill: Capillary refill takes less than 2 seconds  Neurological:      General: No focal deficit present  Mental Status: She is alert and oriented to person, place, and time  Sensory: No sensory deficit  Motor: No weakness  Psychiatric:         Mood and Affect: Mood normal          Thought Content:  Thought content normal          Judgment: Judgment normal          Invasive Devices     Peripheral Intravenous Line            Peripheral IV 12/21/20 Left Hand less than 1 day    Peripheral IV 12/21/20 Right;Ventral (anterior) Hand less than 1 day Lab Results:   Results: I have personally reviewed pertinent reports   , BMP/CMP:   Lab Results   Component Value Date    SODIUM 127 (L) 12/21/2020    K 4 6 12/21/2020    CL 91 (L) 12/21/2020    CO2 20 (L) 12/21/2020    BUN 16 12/21/2020    CREATININE 0 75 12/21/2020    CALCIUM 9 0 12/21/2020    AST 36 12/21/2020    ALT 34 12/21/2020    ALKPHOS 44 (L) 12/21/2020    EGFR 83 12/21/2020    and CBC:   Lab Results   Component Value Date    WBC 19 75 (H) 12/21/2020    HGB 13 3 12/21/2020    HCT 39 6 12/21/2020     (H) 12/21/2020     12/21/2020    MCH 33 6 12/21/2020    MCHC 33 6 12/21/2020    RDW 12 4 12/21/2020    MPV 8 6 (L) 12/21/2020    NRBC 0 12/21/2020     Imaging/EKG Studies: Results: I have personally reviewed pertinent reports  Xr Chest 1 View Portable    Result Date: 12/21/2020  Impression: No acute cardiopulmonary disease  Question subtle deformities of several posterior left ribs which could be due to nondisplaced fractures  No pneumothorax or hemothorax  The study was marked in [facility] for immediate notification  Workstation performed: [id number]     Xr Shoulder 2+ Views Left    Result Date: 12/21/2020  Impression: Comminuted fracture of the left scapula  There are acute left rib fractures seen  Workstation performed: [id number]     Xr Humerus Left    Result Date: 12/21/2020  Impression: 1  Comminuted fracture of the left scapula inferior to the glenoid  2   There are several acute left rib fractures  3   Cortical irregularity in the region of the neck of the left humerus concerning for a nondisplaced fracture  CT scan of the shoulder would be helpful  The study was marked in [employer] for significant notification  Workstation performed: [id number]     Ct Head Without Contrast    Result Date: 12/21/2020  Impression: No acute intracranial abnormality  Left posterior scalp hematoma   Workstation performed: [id number]     Ct Spine Cervical Without Contrast    Result Date: 12/21/2020  Impression: No cervical spine fracture or traumatic malalignment  Workstation performed: [id number]     Ct Chest Abdomen Pelvis W Contrast    Result Date: 12/21/2020  Impression: 1  Acute fractures of the left lateral 3rd through 7th ribs  Comminuted fracture of the left scapula  2   No acute visceral injury within the chest, abdomen, or pelvis   Workstation performed: [id number]     Other Studies:  Neuro neuro    Code Status: Level 1 - Full Code  Advance Directive and Living Will:      Power of :    POLST:

## 2020-12-21 NOTE — ASSESSMENT & PLAN NOTE
-Staining below stated injuries  -patient became dizzy prior to falling which he thinks is related to a antihypertensive that she has been taking for the last several months before bed    She states that after he takes medications she often times feels dizzy and lightheaded   -geriatrics evaluation pending  -PT and OT evaluations are pending as well

## 2020-12-21 NOTE — ASSESSMENT & PLAN NOTE
-wall for performance rib fractures  -on CT scan, no evidence of hemothorax or pneumothorax  Will repeat chest x-ray tomorrow morning to evaluate for delayed pneumothorax or hemothorax   -rib fracture protocol with multimodal analgesic regimen    -IS/airway clearance protocol

## 2020-12-21 NOTE — EMTALA/ACUTE CARE TRANSFER
[facility]  [address] [facility] [address]  Dept: [phone]      EMTALA TRANSFER CONSENT    NAME Daljit OWEN 1955                              MRN [id number]    I have been informed of my rights regarding examination, treatment, and transfer   by Cathy Lopez PA-C  Benefits:  Transfer for trauma evaluation, higher level of care    Risks:  Decline, injury during transfer      Consent for Transfer:  I acknowledge that my medical condition has been evaluated and explained to me by the emergency department physician or other qualified medical person and/or my attending physician, who has recommended that I be transferred to the service of   Dr Daley Staff at  [facility]  The above potential benefits of such transfer, the potential risks associated with such transfer, and the probable risks of not being transferred have been explained to me, and I fully understand them  The doctor has explained that, in my case, the benefits of transfer outweigh the risks  I agree to be transferred  I authorize the performance of emergency medical procedures and treatments upon me in both transit and upon arrival at the receiving facility  Additionally, I authorize the release of any and all medical records to the receiving facility and request they be transported with me, if possible  I understand that the safest mode of transportation during a medical emergency is an ambulance and that the Hospital advocates the use of this mode of transport  Risks of traveling to the receiving facility by car, including absence of medical control, life sustaining equipment, such as oxygen, and medical personnel has been explained to me and I fully understand them  (NETTE CORRECT BOX BELOW)  [  ]  I consent to the stated transfer and to be transported by ambulance/helicopter    [  ]  I consent to the stated transfer, but refuse transportation by ambulance and accept full responsibility for my transportation by car  I understand the risks of non-ambulance transfers and I exonerate the Hospital and its staff from any deterioration in my condition that results from this refusal     X___________________________________________    DATE  20  TIME________  Signature of patient or legally responsible individual signing on patient behalf           RELATIONSHIP TO PATIENT_________________________          Provider Certification    NAME Veronica Velasquez                                         1955                              MRN [id number]    A medical screening exam was performed on the above named patient  Based on the examination:    Condition Necessitating Transfer The primary encounter diagnosis was Fall, initial encounter  Diagnoses of Rib fractures, Scapular fracture, Closed head injury, initial encounter, and Lightheadedness were also pertinent to this visit  Patient Condition:  Stable    Reason for Transfer:  Trauma evaluation, higher level of care    Transfer Requirements: [address] [place] [address]  · Space available and qualified personnel available for treatment as acknowledged by  Dr Venice Rodrigues  · Agreed to accept transfer and to provide appropriate medical treatment as acknowledged by Dr Venice Rodrigues          · Appropriate medical records of the examination and treatment of the patient are provided at the time of transfer   [address] [facility], [address] _______  · Transfer will be performed by qualified personnel from    and appropriate transfer equipment as required, including the use of necessary and appropriate life support measures      Provider Certification: I have examined the patient and explained the following risks and benefits of being transferred/refusing transfer to the patient/family:   Benefits: trauma evaluation, higher level of care, admission for pain control  Risks: decline, injury during transfer      Based on these reasonable risks and benefits to the patient and/or the unborn child(kristine), and based upon the information available at the time of the patients examination, I certify that the medical benefits reasonably to be expected from the provision of appropriate medical treatments at another medical facility outweigh the increasing risks, if any, to the individuals medical condition, and in the case of labor to the unborn child, from effecting the transfer      X____________________________________________ DATE 12/21/20        TIME_______      ORIGINAL - SEND TO MEDICAL RECORDS   COPY - SEND WITH PATIENT DURING TRANSFER

## 2020-12-21 NOTE — ASSESSMENT & PLAN NOTE
-resume home antihypertensives including amlodipine 5 mg on MWF and 2 5 mg all other days, Toprol  mg daily, Spironlactone 50 mg qhs  - will hold olmesartan 40 mg daily in the evening as this is the inciting medication which she feels may be contributing to her dizziness in the evening  I did explain that the combination of her ACE, spironlactone and alcohol in the evening is likely contributing as well  I recommended she not drink alcohol in the evening with taking her anti h hypertensive  Will monitor BP off the ACE for now     - f/u with PCP

## 2020-12-22 ENCOUNTER — APPOINTMENT (INPATIENT)
Dept: RADIOLOGY | Facility: HOSPITAL | Age: 65
DRG: 184 | End: 2020-12-22
Payer: MEDICARE

## 2020-12-22 LAB
ANION GAP SERPL CALCULATED.3IONS-SCNC: 8 MMOL/L (ref 4–13)
BUN SERPL-MCNC: 12 MG/DL (ref 5–25)
CALCIUM SERPL-MCNC: 9 MG/DL (ref 8.3–10.1)
CHLORIDE SERPL-SCNC: 96 MMOL/L (ref 100–108)
CO2 SERPL-SCNC: 25 MMOL/L (ref 21–32)
CREAT SERPL-MCNC: 0.84 MG/DL (ref 0.6–1.3)
ERYTHROCYTE [DISTWIDTH] IN BLOOD BY AUTOMATED COUNT: 13.1 % (ref 11.6–15.1)
GFR SERPL CREATININE-BSD FRML MDRD: 72 ML/MIN/1.73SQ M
GLUCOSE SERPL-MCNC: 118 MG/DL (ref 65–140)
HCT VFR BLD AUTO: 36.2 % (ref 34.8–46.1)
HGB BLD-MCNC: 12.4 G/DL (ref 11.5–15.4)
MCH RBC QN AUTO: 33.2 PG (ref 26.8–34.3)
MCHC RBC AUTO-ENTMCNC: 34.3 G/DL (ref 31.4–37.4)
MCV RBC AUTO: 97 FL (ref 82–98)
PLATELET # BLD AUTO: 228 THOUSANDS/UL (ref 149–390)
PMV BLD AUTO: 8.6 FL (ref 8.9–12.7)
POTASSIUM SERPL-SCNC: 4.5 MMOL/L (ref 3.5–5.3)
RBC # BLD AUTO: 3.73 MILLION/UL (ref 3.81–5.12)
SODIUM SERPL-SCNC: 129 MMOL/L (ref 136–145)
WBC # BLD AUTO: 9.01 THOUSAND/UL (ref 4.31–10.16)

## 2020-12-22 PROCEDURE — 85027 COMPLETE CBC AUTOMATED: CPT | Performed by: PHYSICIAN ASSISTANT

## 2020-12-22 PROCEDURE — 99232 SBSQ HOSP IP/OBS MODERATE 35: CPT | Performed by: SURGERY

## 2020-12-22 PROCEDURE — NC001 PR NO CHARGE: Performed by: NURSE PRACTITIONER

## 2020-12-22 PROCEDURE — 23570 CLTX SCAPULAR FX W/O MNPJ: CPT | Performed by: ORTHOPAEDIC SURGERY

## 2020-12-22 PROCEDURE — 97166 OT EVAL MOD COMPLEX 45 MIN: CPT

## 2020-12-22 PROCEDURE — NC001 PR NO CHARGE: Performed by: ORTHOPAEDIC SURGERY

## 2020-12-22 PROCEDURE — 97162 PT EVAL MOD COMPLEX 30 MIN: CPT

## 2020-12-22 PROCEDURE — 99222 1ST HOSP IP/OBS MODERATE 55: CPT | Performed by: INTERNAL MEDICINE

## 2020-12-22 PROCEDURE — 99222 1ST HOSP IP/OBS MODERATE 55: CPT | Performed by: ORTHOPAEDIC SURGERY

## 2020-12-22 PROCEDURE — 80048 BASIC METABOLIC PNL TOTAL CA: CPT | Performed by: NURSE PRACTITIONER

## 2020-12-22 PROCEDURE — 71046 X-RAY EXAM CHEST 2 VIEWS: CPT

## 2020-12-22 RX ORDER — POLYETHYLENE GLYCOL 3350 17 G/17G
17 POWDER, FOR SOLUTION ORAL DAILY PRN
Status: DISCONTINUED | OUTPATIENT
Start: 2020-12-22 | End: 2020-12-23 | Stop reason: HOSPADM

## 2020-12-22 RX ORDER — SODIUM CHLORIDE 9 MG/ML
75 INJECTION, SOLUTION INTRAVENOUS CONTINUOUS
Status: DISCONTINUED | OUTPATIENT
Start: 2020-12-22 | End: 2020-12-23

## 2020-12-22 RX ORDER — AMOXICILLIN 250 MG
1 CAPSULE ORAL
Status: DISCONTINUED | OUTPATIENT
Start: 2020-12-22 | End: 2020-12-23 | Stop reason: HOSPADM

## 2020-12-22 RX ADMIN — ENOXAPARIN SODIUM 30 MG: 30 INJECTION SUBCUTANEOUS at 21:40

## 2020-12-22 RX ADMIN — DOCUSATE SODIUM AND SENNOSIDES 1 TABLET: 8.6; 5 TABLET ORAL at 21:40

## 2020-12-22 RX ADMIN — SERTRALINE HYDROCHLORIDE 50 MG: 50 TABLET ORAL at 08:36

## 2020-12-22 RX ADMIN — METHOCARBAMOL 500 MG: 500 TABLET ORAL at 13:40

## 2020-12-22 RX ADMIN — METHOCARBAMOL 500 MG: 500 TABLET ORAL at 06:23

## 2020-12-22 RX ADMIN — THIAMINE HCL TAB 100 MG 100 MG: 100 TAB at 08:36

## 2020-12-22 RX ADMIN — METOPROLOL SUCCINATE 100 MG: 100 TABLET, EXTENDED RELEASE ORAL at 08:36

## 2020-12-22 RX ADMIN — ACETAMINOPHEN 975 MG: 325 TABLET, FILM COATED ORAL at 06:21

## 2020-12-22 RX ADMIN — ACETAMINOPHEN 975 MG: 325 TABLET, FILM COATED ORAL at 21:40

## 2020-12-22 RX ADMIN — GABAPENTIN 300 MG: 300 CAPSULE ORAL at 21:40

## 2020-12-22 RX ADMIN — Medication 1 TABLET: at 08:36

## 2020-12-22 RX ADMIN — METHOCARBAMOL 500 MG: 500 TABLET ORAL at 02:00

## 2020-12-22 RX ADMIN — ENOXAPARIN SODIUM 30 MG: 30 INJECTION SUBCUTANEOUS at 08:37

## 2020-12-22 RX ADMIN — METHOCARBAMOL 500 MG: 500 TABLET ORAL at 18:19

## 2020-12-22 RX ADMIN — SODIUM CHLORIDE 75 ML/HR: 0.9 INJECTION, SOLUTION INTRAVENOUS at 17:41

## 2020-12-22 RX ADMIN — PREDNISONE 12 MG: 1 TABLET ORAL at 08:37

## 2020-12-22 RX ADMIN — ACETAMINOPHEN 975 MG: 325 TABLET, FILM COATED ORAL at 13:40

## 2020-12-22 RX ADMIN — SPIRONOLACTONE 50 MG: 50 TABLET, FILM COATED ORAL at 08:36

## 2020-12-22 RX ADMIN — OXYCODONE HYDROCHLORIDE 5 MG: 5 TABLET ORAL at 21:40

## 2020-12-22 RX ADMIN — AMLODIPINE BESYLATE 2.5 MG: 2.5 TABLET ORAL at 08:36

## 2020-12-22 RX ADMIN — FLUTICASONE PROPIONATE 1 SPRAY: 50 SPRAY, METERED NASAL at 08:35

## 2020-12-22 RX ADMIN — OXYCODONE HYDROCHLORIDE 5 MG: 5 TABLET ORAL at 06:24

## 2020-12-22 RX ADMIN — FOLIC ACID 1 MG: 1 TABLET ORAL at 08:37

## 2020-12-22 NOTE — ASSESSMENT & PLAN NOTE
-nonweightbearing to the left upper extremity  -sling to Left upper extremity  Cannot sling for elbow and wrist range of motion  - there was concern on left humerus x-ray for a "Cortical irregularity in the region of the neck of the left humerus concerning for a nondisplaced fracture" a CT was recommended  Will discuss with Orthopedics    -pain control  -PT and OT

## 2020-12-22 NOTE — PHYSICAL THERAPY NOTE
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 hair loss 2/4/2020    Hyponatremia 10/2/2020    [facility] depressive [facility] with single episode[facility] 10/10/2019    Mild persistent asthma without complication 6/8/7176    Right chronic serous otitis media 11/7/2017    Added automatically from request for surgery [id number]    Temporal arteritis ([facility] ) 9/4/2020       Past Surgical History:   Procedure Laterality Date    LAPAROSCOPY      X2 for endometriosis    ND TEMPORAL ARTERY LIGATN OR BX Left 8/14/2020    Procedure: TEMPORAL ARTERY BIOPSY;  Surgeon: Remi Harvey MD;  Location: [address] [place] [address];  Service: General       Length Of Stay: 1    PHYSICAL THERAPY EVALUATION:        12/22/20 1015   Note Type   Note type Evaluation   Pain Assessment   Pain Assessment Tool 0-10   Pain Score 5   Pain Location/Orientation Orientation: Left; Location: Rib Cage   Pain Onset/Description Onset: Ongoing;Frequency: Constant/Continuous; Descriptor: Aching   Effect of Pain on Daily Activities Increased pain with activity   Patient's Stated Pain Goal No pain   Hospital Pain Intervention(s) Ambulation/increased activity;Repositioned   Home Living   Type of [facility] Two level;Bed/bath upstairs;Stairs to enter with rails  (2 MANOHAR )   Home Equipment Crutches  (Patient denies use prior to admission)   Additional Comments Patient reports with with spouse who is able to assist as   Prior Function   Level of [place] Independent with ADLs and functional mobility   Lives With Spouse   Receives Help From Family   ADL Assistance Independent Falls in the last 6 months 1 to 4  (2 as per pt )   Comments Patient denies use of an assistive device for ambulation prior to admission   Restrictions/Precautions   Weight Bearing Precautions Per Order Yes   LUE Weight Bearing Per Order NWB  (In sling)   Braces or Orthoses Other (Comment)  (LUE sling)   Other Precautions WBS;Pain   General   Family/Caregiver Present No   Cognition   Overall Cognitive Status WFL   Arousal/Participation Alert   Orientation Level Oriented to person;Oriented to place;Oriented to time   Memory Within functional limits   Following Commands Follows one step commands without difficulty   RUE Assessment   RUE Assessment WFL   LUE Assessment   LUE Assessment X   RLE Assessment   RLE Assessment WFL   LLE Assessment   LLE Assessment WFL   Bed Mobility   Supine to Sit 4  Minimal assistance   Additional items Assist x 1; Increased time required;Verbal cues   Transfers   Sit to Stand 5  Supervision   Additional items Increased time required   Stand to Sit 5  Supervision   Additional items Increased time required   Ambulation/Elevation   Gait pattern Foward flexed   Gait Assistance 5  Supervision   Assistive Device None   Distance 75ft x 2    Balance   Static Sitting Fair +   Static Standing Fair   Ambulatory Fair -   Endurance Deficit   Endurance Deficit Yes   Endurance Deficit Description   (Pain)   Activity Tolerance   Activity Tolerance Patient limited by pain   Medical Staff Made Aware Chise, OT    Nurse Made Aware Patient appropriate to be seen and mobilized per nursing   Assessment   Prognosis Good   Problem List Decreased range of motion;Orthopedic restrictions;Pain;Decreased mobility   Assessment Pt is 77 y o  female seen for PT evaluation s/p admit to [facility] on 12/21/2020  Two pt identifiers were used to confirm  Pt presented s/p fall down stairs at home  Pt originally presented at [facility] and was transferred to [facility] for further medical management   Pt was admitted with a primary dx of:  Fracture of multiple ribs on left side, left scapular fracture, and other active problems including acute pain due to trauma, hyponatremia, chronic alcohol abuse, history of hypertension, history of depression  Ortho recommending nonweightbearing to left upper extremity and non op management for scapular fracture at this time  PT now consulted for assessment of mobility and d/c needs  Pt with Up in chair orders  Pts current co morbidities affecting treatment include: Anxiety state, benign essential HTN, hyponatremia, temporal arteritis, history of depression, and personal factors including steps to manage at home  Pts current clinical presentation is Evolving (medium complexity) due to Ongoing medical management for primary dx, Decreased activity tolerance compared to baseline, Increased assistance needed from caregiver at current time, Current WBS, Continuous pulse oximetry monitoring     Prior to admission, pt was independent with ambulation without the use of assistive device as per patient  Upon evaluation, pt currently is requiring Min A x1 for bed mobility; Supervision for transfers and Supervision for ambulation w/ no AD   Patient denies any lightheadedness or dizziness with ambulation  Pt presents at PT eval functioning below baseline and currently w/ overall mobility deficits 2* to: decreased ROM, pain, decreased activity tolerance compared to baseline, orthopedic restrictions  At conclusion of PT session pt returned back in chair with phone and call bell within reach  Pt denies any further questions at this time  PT is currently recommending Home with increased family support and Outpatient PT when appropriate  Pt agreeable to plan and goals as stated on evaluation  D/C acute care PT at this time due to pt being min A/S with all mobility and having supportive spouse who is able to assist a home  Pt denies any mobility or safety concerns about returning home at d/c   Recommend pt continues to mobilize with nsg and restorative techs during hospital stay  Barriers to Discharge None   Barriers to Discharge Comments Patient denies any mobility or safety concerns about returning home at time of discharge  Goals   Patient Goals " to go home"   Recommendation   PT Discharge Recommendation Return to previous environment with social support;Home with skilled therapy; Other (Comment)  (home with increased support, OPPT when appropriate )   Equipment Recommended   (None at this time)   PT - OK to Discharge Yes  (When medically cleared)   Modified Henderson Scale   Modified Henderson Scale 4   Barthel Index   Feeding 10   Bathing 0   Grooming Score 5   Dressing Score 5   Bladder Score 10   Bowels Score 10   Toilet Use Score 10   Transfers (Bed/Chair) Score 10   Mobility (Level Surface) Score 10   Stairs Score 5   Barthel Index Score 75   Portions of the documentation may have been created using voice recognition software  Occasional wrong word or sound alike substitutions may have occurred due to the inherent limitations of the voice recognition software  Read the chart carefully and recognize, using context, where substitutions have occurred      Tee Marie, PT, DPT

## 2020-12-22 NOTE — PROGRESS NOTES
Reviewed home med list with [facility] pharmacy in [place] [phone]  Albuterol 90mcg 2 puffs - not seen on list  Alendronate 70mg weekly  Amlodipine 5mg MWF, 2 5mg TThSaSu  Fluticasone 1 spray daily  Magnesium-Aluminum - not seen on list  Metoprolol succ 100mg daily  Olmesartan 40mg daily  Omega 3 - not seen on list  Prednisone 1mg as directed  Prednisone 5mg as directed  (Had a prednisone 20mg in October but not filled)  Sertraline 50mg daily  Spironolactone 50mg daily  Per pharm tech, all scripts on time

## 2020-12-22 NOTE — ASSESSMENT & PLAN NOTE
- left rib fractures 3-7  -On CT scan, no evidence of hemothorax or pneumothorax  -repeat chest x-ray is unremarkable   -Rib fracture protocol with multimodal analgesic regimen    -IS/airway clearance protocol

## 2020-12-22 NOTE — PROGRESS NOTES
Progress Note - Sachin Velasquez 1955, 77 y o  female MRN: [id number]    Unit/Bed#: [facility] 607-01 Encounter: [id number]    Primary Care Provider: Roselyn Mesa MD   Date and time admitted to hospital: 12/21/2020 11:53 AM        Fall  Assessment & Plan  -Staining below stated injuries  -patient became dizzy prior to falling which he thinks is related to a antihypertensive that she has been taking for the last several months before bed  She states that after he takes medications she often times feels dizzy and lightheaded   -geriatrics consult and appreciated  -PT and OT evaluated  Note appreciated  Approved for return to previous environment  Will plan for discharge home tomorrow  * Fracture of multiple ribs of left side  Assessment & Plan  - left rib fractures 3-7  -On CT scan, no evidence of hemothorax or pneumothorax  -repeat chest x-ray is unremarkable   -Rib fracture protocol with multimodal analgesic regimen  -IS/airway clearance protocol    Scapula fracture  Assessment & Plan  -nonweightbearing to the left upper extremity  -sling to Left upper extremity  Cannot sling for elbow and wrist range of motion  - there was concern on left humerus x-ray for a "Cortical irregularity in the region of the neck of the left humerus concerning for a nondisplaced fracture" a CT was recommended  Will discuss with Orthopedics  -pain control  -PT and OT    Hyponatremia  Assessment & Plan  -patient appears to have chronic hyponatremia likely related to alcohol use  -sodium run typically from 129-132  Sodium level improved today to 129    -will place continue on fluid restriction and started on IVF  -Recheck BMP in a m   - will need to follow up with PCP as an outpatient  Acute pain due to trauma  Assessment & Plan  -multimodal analgesic regimen including scheduled Tylenol, gabapentin, methocarbamol    Oxycodone and IV Dilaudid available for moderate to severe pain in breakthrough pain as well, respectively     -bowel regimen ordered as well    History of depression  Assessment & Plan  - resume home zoloft  - per patient, this is well controlled    Chronic alcohol abuse  Assessment & Plan  -patient states she drinks at least 1 alcohol beverage prior to bed every evening  -denies history of alcohol withdrawal  -will place on CIWA protocol  -will place on folic acid, thiamine, and multivitamin  -will monitor for signs and symptoms of alcohol withdrawal    History of hypertension  Assessment & Plan  -resume home antihypertensives including amlodipine 5 mg on MWF and 2 5 mg all other days, Toprol  mg daily, Spironlactone 50 mg qhs  -continue to hold olesartan 40 mg daily, due this is possibly leading to her dizziness secondary to a combination of Ace, spironolactone, and alcohol  Blood pressure has been normotensive  Will continue to monitor blood pressure off ACE   - f/u with PCP      TERTIARY TRAUMA SURVEY NOTE    Prophylaxis: Sequential compression device (Venodyne)  and Enoxaparin (Lovenox)    Disposition:  Discharged home in a m  if patient has hyponatremia improves  Code status:  Level 1 - Full Code    Consultants:  Geriatrics, orthopedics  Is the patient 72 years or older?: YES:    1  Before the illness or injury that brought you to the Emergency, did you need someone to help you on a regular basis? 0=No   2  Since the illness or injury that brought you to the Emergency, have you needed more help than usual to take care of yourself? 1=Yes   3  Have you been hospitalized for one or more nights during the past 6 months (excluding a stay in the Emergency Department)? 0=No   4  In general, do you see well? 0=Yes   5  In general, do you have serious problems with your memory? 0=No   6  Do you take more than three different medications everyday?  1=Yes   TOTAL   2     Did you order a geriatric consult if the score was 2 or greater?: yes    Identification of Seniors at Risk      Most Recent Value (ISAR) Identification of Seniors at Risk   Before the illness or injury that brought you to the Emergency, did you need someone to help you on a regular basis? 1 Filed at: 12/21/2020 1202   In the last 24 hours, have you needed more help than usual?  0 Filed at: 12/21/2020 1202   Have you been hospitalized for one or more nights during the past 6 months? 1 Filed at: 12/21/2020 1202   In general, do you see well?  0 Filed at: 12/21/2020 1202   In general, do you have serious problems with your memory? 0 Filed at: 12/21/2020 1202   Do you take more than three different medications every day? 1 Filed at: 12/21/2020 1202   ISAR Score  3 Filed at: 12/21/2020 1202            SUBJECTIVE:     Transfer from: N/A  Outside Films Received: N/A  Tertiary Exam Due on: 12/22  Mechanism of Injury:Fall    Details related to Injury:  Fall downstairs  Believed to be related to syncope/dizziness/antihypertensive medications  Chief Complaint:  Left-sided back pain  HPI/Last 24 hour events: This is a 61-year-old female that presented yesterday for evaluation post syncopal fall down multiple stairs  Patient suffered a left 3-7 rib fractures and a left scapular fracture  Orthopedics was consulted and she was deemed non op  Patient was placed in left shoulder sling  Repeat chest x-ray was unremarkable  Patient describes that she feels well overall, but gets left-sided back/rib pain on the left side with deep breathing and vigorous movement  She denies any shortness of breath or difficulty breathing  She states her pain has been under control with her current pain regimen  She is pulling 1 5 L on her incentive spirometer  She describes having some left shoulder pain but no left arm pain  Patient states that she feels ready to return home tomorrow  A 12 point review of systems was completed and is otherwise negative if not mentioned above      Active medications:           Current Facility-Administered Medications:     acetaminophen (TYLENOL) tablet 975 mg, 975 mg, Oral, Q8H [facility], 975 mg at 12/22/20 1340    albuterol (PROVENTIL HFA,VENTOLIN HFA) inhaler 2 puff, 2 puff, Inhalation, Q6H PRN    amLODIPine (NORVASC) tablet 2 5 mg, 2 5 mg, Oral, Once per day on Sun Tue Thu Sat, 2 5 mg at 12/22/20 0836    [START ON 12/23/2020] amLODIPine (NORVASC) tablet 5 mg, 5 mg, Oral, Once per day on Mon Wed Fri    enoxaparin (LOVENOX) subcutaneous injection 30 mg, 30 mg, Subcutaneous, Q12H [facility], 30 mg at 12/22/20 2923    fish oil capsule 1,000 mg, 1,000 mg, Oral, Daily    fluticasone (FLONASE) 50 mcg/act nasal spray 1 spray, 1 spray, Nasal, Daily, 1 spray at 64/77/25 9994    folic acid (FOLVITE) tablet 1 mg, 1 mg, Oral, Daily, 1 mg at 12/22/20 3291    gabapentin (NEURONTIN) capsule 300 mg, 300 mg, Oral, HS, 300 mg at 12/21/20 2218    HYDROmorphone (DILAUDID) injection 0 2 mg, 0 2 mg, Intravenous, Q4H PRN    methocarbamol (ROBAXIN) tablet 500 mg, 500 mg, Oral, Q6H [facility], 500 mg at 12/22/20 1340    metoprolol succinate (TOPROL-XL) 24 hr tablet 100 mg, 100 mg, Oral, Daily, 100 mg at 12/22/20 0836    multivitamin-minerals (CENTRUM) tablet 1 tablet, 1 tablet, Oral, Daily, 1 tablet at 12/22/20 0836    ondansetron (ZOFRAN) injection 4 mg, 4 mg, Intravenous, Q6H PRN    oxyCODONE (ROXICODONE) IR tablet 2 5 mg, 2 5 mg, Oral, Q4H PRN    oxyCODONE (ROXICODONE) IR tablet 5 mg, 5 mg, Oral, Q4H PRN, 5 mg at 12/22/20 0624    polyethylene glycol (MIRALAX) packet 17 g, 17 g, Oral, Daily PRN    predniSONE tablet 12 mg, 12 mg, Oral, Daily, 12 mg at 12/22/20 0837    senna-docusate sodium (SENOKOT S) 8 6-50 mg per tablet 1 tablet, 1 tablet, Oral, HS    sertraline (ZOLOFT) tablet 50 mg, 50 mg, Oral, Daily, 50 mg at 12/22/20 0836    sodium chloride 0 9 % infusion, 75 mL/hr, Intravenous, Continuous, 75 mL/hr at 12/22/20 1741    spironolactone (ALDACTONE) tablet 50 mg, 50 mg, Oral, Daily, 50 mg at 12/22/20 0836    thiamine (VITAMIN B1) tablet 100 mg, 100 mg, Oral, Daily, 100 mg at 12/22/20 0836      OBJECTIVE:     Vitals:   Vitals:    12/22/20 1604   BP: 147/79   Pulse: 84   Resp: 18   Temp: 99 8 °F (37 7 °C)   SpO2: 91%       Physical Exam:   GENERAL APPEARANCE:  No acute distress  NEURO:  GCS 15  Light touch sensation intact  HEENT:  Normocephalic  Atraumatic  Face nontender  PERRLA  Oropharynx clear and moist   Neck is supple  CV:  Regular rate and rhythm   +2 radial and dorsalis pedis pulses, bilaterally  LUNGS:  Clear to auscultation, bilaterally  No rales, no rhonchi, no wheezing  GI:  Abdomen is soft and nontender  Normal bowel sounds  :  No Miranda  MSK:  Left upper extremity in arm sling, but displays normal elbow and hand range of motion and strength  Left arm is nontender on palpation  Otherwise, patient moves all other extremities with 5/5 strength  SKIN:  Skin warm, dry, intact  Well perfused  I/O:   I/O       12/20 0701 - 12/21 0700 12/21 0701 - 12/22 0700 12/22 0701 - 12/23 0700    P  O   1200 720    Total Intake(mL/kg)  1200 (14) 720 (8 4)    Urine (mL/kg/hr)  800 400 (0 4)    Stool   0    Total Output  800 400    Net  +400 +320           Unmeasured Urine Occurrence  1 x 3 x    Unmeasured Stool Occurrence   0 x          Invasive Devices: Invasive Devices     Peripheral Intravenous Line            Peripheral IV 12/21/20 Left Hand 1 day                  Imaging:   Xr Chest 1 View Portable    Result Date: 12/21/2020  Impression: No acute cardiopulmonary disease  Question subtle deformities of several posterior left ribs which could be due to nondisplaced fractures  No pneumothorax or hemothorax  The study was marked in [facility] for immediate notification  Workstation performed: [id number]     Xr Chest Pa & Lateral    Result Date: 12/22/2020  Impression: No acute cardiopulmonary disease   Workstation performed: [id number]     Xr Shoulder 2+ Views Left    Result Date: 12/21/2020  Impression: Comminuted fracture of the left scapula  There are acute left rib fractures seen  Workstation performed: [id number]     Xr Humerus Left    Result Date: 12/21/2020  Impression: 1  Comminuted fracture of the left scapula inferior to the glenoid  2   There are several acute left rib fractures  3   Cortical irregularity in the region of the neck of the left humerus concerning for a nondisplaced fracture  CT scan of the shoulder would be helpful  The study was marked in EPIC for significant notification  Workstation performed: [id number]     Ct Head Without Contrast    Result Date: 12/21/2020  Impression: No acute intracranial abnormality  Left posterior scalp hematoma  Workstation performed: [id number]     Ct Spine Cervical Without Contrast    Result Date: 12/21/2020  Impression: No cervical spine fracture or traumatic malalignment  Workstation performed: [id number]     Ct Chest Abdomen Pelvis W Contrast    Result Date: 12/21/2020  Impression: 1  Acute fractures of the left lateral 3rd through 7th ribs  Comminuted fracture of the left scapula  2   No acute visceral injury within the chest, abdomen, or pelvis  Workstation performed: [id number]       Labs:   CBC:   Lab Results   Component Value Date    WBC 9 01 12/22/2020    HGB 12 4 12/22/2020    HCT 36 2 12/22/2020    MCV 97 12/22/2020     12/22/2020    MCH 33 2 12/22/2020    MCHC 34 3 12/22/2020    RDW 13 1 12/22/2020    MPV 8 6 (L) 12/22/2020     CMP:   Lab Results   Component Value Date    CL 96 (L) 12/22/2020    CO2 25 12/22/2020    BUN 12 12/22/2020    CREATININE 0 84 12/22/2020    CALCIUM 9 0 12/22/2020    EGFR 72 12/22/2020     Radiology review:  Chest x-ray stable

## 2020-12-22 NOTE — ED PROVIDER NOTES
History  Chief Complaint   Patient presents with    Fall     at 11pm patient feel down about 7 steps  LOC, hit head, unsure of down time, now c/o Left shoulder pain  No blood thinners   Syncope     Patient reports that for the last few months she has been having increased dizziness that she believes may be related to a change in her nightime BP medications     Robe Johnson is a 78 yo F, hx of chronic hyponatremia, presenting for evaluation of fall down approximately 7 stairs occurring yesterday evening  Reports she was ascending the stairs when she felt lightheaded "like I could pass out" before falling backwards down the stairs  Pt reports numerous episodes of similar lightheadedness following taking evening antihypertensive medications, which she states has been occurring for several months  Reports striking back of head during fall, no LOC  Reports she has had persistent L posterior rib/chest wall pain, L shoulder, and L scapular pain since injury  Reports minimal, posterior headache at this time  No neck pain/stiffness  No blurry/double vision  No numbness, tingling, or weakness in extremities  Denies anterior/substernal chest pain or shortness of breath  No blood thinners or aspirin use  History provided by:  Patient   used: No    Fall  Mechanism of injury: fall    Injury location:  Head/neck, torso and shoulder/arm  Shoulder/arm injury location:  L shoulder  Torso injury location:  Back and L chest  Time since incident:  12 hours  Arrived directly from scene: no    Fall:     Fall occurred:  Down stairs    Height of fall:  7 stairs    Impact surface:  Hard floor  Associated symptoms: back pain    Associated symptoms: no abdominal pain, no blindness, no hearing loss, no loss of consciousness and no neck pain    Risk factors: no anticoagulation therapy        Prior to Admission Medications   Prescriptions Last Dose Informant Patient Reported? Taking?    Aluminum & Magnesium Hydroxide (MAGNESIUM-ALUMINUM PO) 2020 at Unknown time Self Yes Yes   Sig: Take by mouth   Omega-3 1000 MG CAPS More than a month at Unknown time Self Yes No   Sig: Take by mouth   albuterol (PROVENTIL HFA,VENTOLIN HFA) 90 mcg/act inhaler More than a month at Unknown time  No No   Sig: Inhale 2 puffs every 6 (six) hours as needed for wheezing or shortness of breath   alendronate (Fosamax) 70 mg tablet 2020 at Unknown time Self No Yes   Sig: Take 1 tablet (70 mg total) by mouth every 7 days   amLODIPine (NORVASC) 5 mg tablet 2020 at Unknown time Self No Yes   Sig: One tablet MWF, 1/2 tablet other days   fluticasone (FLONASE) 50 mcg/act nasal spray 2020 at Unknown time  No Yes   Si spray into each nostril daily   metoprolol succinate (TOPROL-XL) 100 mg 24 hr tablet 2020 at Unknown time Self No Yes   Sig: Take 1 tablet (100 mg total) by mouth daily   olmesartan (BENICAR) 40 mg tablet 2020 at Unknown time  No Yes   Sig: Take 1 tablet (40 mg total) by mouth daily   predniSONE 1 mg tablet 2020 at Unknown time  No Yes   Sig: Use as directed   predniSONE 5 mg tablet 2020 at Unknown time Self No Yes   Sig: Use 1-3 tablets daily or as directed   sertraline (ZOLOFT) 50 mg tablet 2020 at Unknown time Self No Yes   Sig: Take 1 tablet (50 mg total) by mouth daily   spironolactone (ALDACTONE) 50 mg tablet 2020 at Unknown time Self No Yes   Sig: Take 1 tablet (50 mg total) by mouth daily      Facility-Administered Medications: None       Past Medical History:   Diagnosis Date    Anxiety state 2014    Benign essential hypertension 2010    Chronic rhinitis 2012    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 hair loss 2020    Hyponatremia 10/2/2020    Major depressive disorder with single episode, in full remission ([facility] [address] ) 10/10/2019    Mild persistent asthma without complication 2132    Right chronic serous otitis media 2017    Added automatically from request for surgery [id number]    Temporal arteritis ([facility] [address] ) 9/4/2020       Past Surgical History:   Procedure Laterality Date    LAPAROSCOPY      X2 for endometriosis    MT TEMPORAL ARTERY LIGATN OR BX Left 8/14/2020    Procedure: TEMPORAL ARTERY BIOPSY;  Surgeon: Jp Griffith MD;  Location: [address] [place] [address] OR;  Service: General       Family History   Problem Relation Age of Onset    Coronary artery disease Mother     Coronary artery disease Father     No Known Problems Maternal Grandmother     No Known Problems Maternal Grandfather     Leukemia Paternal Grandmother 71    No Known Problems Paternal Grandfather     No Known Problems Son     No Known Problems Son      I have reviewed and agree with the history as documented  E-Cigarette/Vaping    E-Cigarette Use Never User      E-Cigarette/Vaping Substances     Social History     Tobacco Use    Smoking status: Never Smoker    Smokeless tobacco: Never Used   Substance Use Topics    Alcohol use: Yes     Frequency: 4 or more times a week     Drinks per session: 1 or 2     Binge frequency: Never     Comment: socially    Drug use: Never       Review of Systems   Constitutional: Negative for chills  HENT: Negative for congestion, hearing loss, rhinorrhea and sore throat  Eyes: Negative for blindness, pain and visual disturbance  Respiratory: Negative for cough and wheezing  Gastrointestinal: Negative for abdominal pain  Genitourinary: Negative for dysuria, frequency and urgency  Musculoskeletal: Positive for arthralgias and back pain  Negative for joint swelling, neck pain and neck stiffness  Skin: Negative for rash and wound  Neurological: Negative for loss of consciousness, light-headedness and numbness  Physical Exam  Physical Exam  Constitutional:       General: She is not in acute distress  Appearance: She is well-developed  She is not diaphoretic  HENT:      Head:      Comments: Posterior scalp hematoma  No raccoon's eyes or Blake signs   No skull deformity appreciable  No rhinorrhea or otorrhea  No hemotympanum     Right Ear: External ear normal       Left Ear: External ear normal    Eyes:      Conjunctiva/sclera: Conjunctivae normal       Pupils: Pupils are equal, round, and reactive to light  Neck:      Musculoskeletal: Normal range of motion and neck supple  Cardiovascular:      Rate and Rhythm: Normal rate and regular rhythm  Heart sounds: Normal heart sounds  No murmur  No friction rub  No gallop  Pulmonary:      Effort: Pulmonary effort is normal  No respiratory distress  Breath sounds: Normal breath sounds  No wheezing  Abdominal:      General: There is no distension  Palpations: Abdomen is soft  Tenderness: There is no abdominal tenderness  Musculoskeletal:         General: Tenderness present  Comments: Tenderness over L posterior chest wall, scapula  Minimal ROM L shoulder in abduction/flexion due to pain  2+ distal pulses b/l UE and LE  Exam for strength to LUE limited due to pain  No midline C/T/L TTP  Pt ambulatory without difficulty  Lymphadenopathy:      Cervical: No cervical adenopathy  Skin:     General: Skin is warm and dry  Capillary Refill: Capillary refill takes less than 2 seconds  Findings: No erythema or rash  Neurological:      Mental Status: She is alert and oriented to person, place, and time  Motor: No abnormal muscle tone  Coordination: Coordination normal    Psychiatric:         Behavior: Behavior normal          Thought Content:  Thought content normal          Judgment: Judgment normal          Vital Signs  ED Triage Vitals   Temperature Pulse Respirations Blood Pressure SpO2   12/21/20 0554 12/21/20 0552 12/21/20 0552 12/21/20 0552 12/21/20 0552   97 5 °F (36 4 °C) 74 16 137/76 95 %      Temp Source Heart Rate Source Patient Position - Orthostatic VS BP Location FiO2 (%)   12/21/20 0554 12/21/20 0552 12/21/20 0552 12/21/20 0552 --   Temporal Monitor Lying Left arm       Pain Score       12/21/20 0552       5           Vitals:    12/21/20 0552 12/21/20 0657 12/21/20 0805 12/21/20 0930   BP: 137/76  (!) 182/77 (!) 176/79   Pulse: 74 78 81 91   Patient Position - Orthostatic VS: Lying Lying Lying Lying         Visual Acuity  Visual Acuity      Most Recent Value   L Pupil Size (mm)  2   R Pupil Size (mm)  2          ED Medications  Medications   fentanyl citrate (PF) (FOR EMS ONLY) 100 mcg/2 mL injection 100 mcg (0 mcg Does not apply Given to EMS 12/21/20 0645)   ondansetron (FOR EMS ONLY) (ZOFRAN) 4 mg/2 mL injection 4 mg (0 mg Does not apply Given to EMS 12/21/20 0645)   sodium chloride 0 9 % bolus 500 mL (0 mL Intravenous Stopped 12/21/20 0822)   iohexol (OMNIPAQUE) 350 MG/ML injection (SINGLE-DOSE) 100 mL (100 mL Intravenous Given 12/21/20 0754)   fentanyl citrate (PF) 100 MCG/2ML 25 mcg (25 mcg Intravenous Given 12/21/20 0801)       Diagnostic Studies  Results Reviewed     Procedure Component Value Units Date/Time    TSH, 3rd generation with Free T4 reflex [[id number]]  (Normal) Collected: 12/21/20 0649    Lab Status: Final result Specimen: Blood from Arm, Left Updated: 12/21/20 0746     TSH 3RD GENERATON 1 049 uIU/mL     Narrative:      Patients undergoing fluorescein dye angiography may retain small amounts of fluorescein in the body for 48-72 hours post procedure  Samples containing fluorescein can produce falsely depressed TSH values  If the patient had this procedure,a specimen should be resubmitted post fluorescein clearance        Magnesium [[id number]]  (Normal) Collected: 12/21/20 0649    Lab Status: Final result Specimen: Blood from Arm, Left Updated: 12/21/20 0746     Magnesium 1 8 mg/dL     APTT [[id number]]  (Normal) Collected: 12/21/20 0649    Lab Status: Final result Specimen: Blood from Arm, Left Updated: 12/21/20 0730     PTT 26 seconds     Protime-INR [[id number]]  (Normal) Collected: 12/21/20 0649    Lab Status: Final result Specimen: Blood from Arm, Left Updated: 12/21/20 0730     Protime 11 9 seconds      INR 0 89    Troponin I [[id number]]  (Normal) Collected: 12/21/20 0614    Lab Status: Final result Specimen: Blood from Arm, Left Updated: 12/21/20 0704     Troponin I <0 02 ng/mL     Comprehensive metabolic panel [[id number]]  (Abnormal) Collected: 12/21/20 0614    Lab Status: Final result Specimen: Blood from Arm, Left Updated: 12/21/20 0704     Sodium 127 mmol/L      Potassium 4 6 mmol/L      Chloride 91 mmol/L      CO2 20 mmol/L      ANION GAP 16 mmol/L      BUN 16 mg/dL      Creatinine 0 75 mg/dL      Glucose 143 mg/dL      Calcium 9 0 mg/dL      AST 36 U/L      ALT 34 U/L      Alkaline Phosphatase 44 U/L      Total Protein 7 3 g/dL      Albumin 3 6 g/dL      Total Bilirubin 0 35 mg/dL      eGFR 83 ml/min/1 73sq m     Narrative:      [place] guidelines for Chronic Kidney Disease (CKD):     Stage 1 with normal or high GFR (GFR > 90 mL/min/1 73 square meters)    Stage 2 Mild CKD (GFR = 60-89 mL/min/1 73 square meters)    Stage 3A Moderate CKD (GFR = 45-59 mL/min/1 73 square meters)    Stage 3B Moderate CKD (GFR = 30-44 mL/min/1 73 square meters)    Stage 4 Severe CKD (GFR = 15-29 mL/min/1 73 square meters)    Stage 5 End Stage CKD (GFR <15 mL/min/1 73 square meters)  Note: GFR calculation is accurate only with a steady state creatinine    CBC and differential [[id number]]  (Abnormal) Collected: 12/21/20 0614    Lab Status: Final result Specimen: Blood from Arm, Left Updated: 12/21/20 0621     WBC 19 75 Thousand/uL      RBC 3 96 Million/uL      Hemoglobin 13 3 g/dL      Hematocrit 39 6 %       fL      MCH 33 6 pg      MCHC 33 6 g/dL      RDW 12 4 %      MPV 8 6 fL      Platelets 159 Thousands/uL      nRBC 0 /100 WBCs      Neutrophils Relative 87 %      Immat GRANS % 1 %      Lymphocytes Relative 6 %      Monocytes Relative 6 %      Eosinophils Relative 0 %      Basophils Relative 0 %      Neutrophils Absolute 17 14 Thousands/µL      Immature Grans Absolute 0 27 Thousand/uL      Lymphocytes Absolute 1 09 Thousands/µL      Monocytes Absolute 1 11 Thousand/µL      Eosinophils Absolute 0 08 Thousand/µL      Basophils Absolute 0 06 Thousands/µL                  CT chest abdomen pelvis w contrast   Final Result by Curly Brandt MD (12/21 0830)         1  Acute fractures of the left lateral 3rd through 7th ribs  Comminuted fracture of the left scapula  2   No acute visceral injury within the chest, abdomen, or pelvis  Workstation performed: [id number]         CT head without contrast   Final Result by Curly Brandt MD (12/21 2078)      No acute intracranial abnormality  Left posterior scalp hematoma  Workstation performed: [id number]         CT spine cervical without contrast   Final Result by Curly Brandt MD (12/21 6728)      No cervical spine fracture or traumatic malalignment  Workstation performed: [id number]         XR shoulder 2+ views LEFT   Final Result by Elaine Munroe MD (12/21 7420)      Comminuted fracture of the left scapula  There are acute left rib fractures seen  Workstation performed: [id number]         XR humerus LEFT   Final Result by Elaine Munroe MD (12/21 0800)         1  Comminuted fracture of the left scapula inferior to the glenoid  2   There are several acute left rib fractures  3   Cortical irregularity in the region of the neck of the left humerus concerning for a nondisplaced fracture  CT scan of the shoulder would be helpful  The study was marked in EPIC for significant notification  Workstation performed: [id number]         XR chest 1 view portable   Final Result by Layo Dodd MD (12/21 2253)      No acute cardiopulmonary disease  Question subtle deformities of several posterior left ribs which could be due to nondisplaced fractures  No pneumothorax or hemothorax        The study was marked in EPIC for immediate notification  Workstation performed: [id number]                    Procedures  ECG 12 Lead Documentation Only    Date/Time: 12/21/2020 6:05 AM  Performed by: Ulices Lopez PA-C  Authorized by: Ulices Lopez PA-C     Indications / Diagnosis:  Lightheadedness  ECG reviewed by me, the ED Provider: yes    Patient location:  ED  Previous ECG:     Previous ECG:  Unavailable    Comparison to cardiac monitor: Yes    Interpretation:     Interpretation: normal    Rate:     ECG rate:  73    ECG rate assessment: normal    Rhythm:     Rhythm: sinus rhythm    Ectopy:     Ectopy: none    QRS:     QRS axis:  Normal    QRS intervals:  Normal  Conduction:     Conduction: normal    ST segments:     ST segments:  Normal  T waves:     T waves: normal               ED Course  ED Course as of Dec 22 0953   Mon Dec 21, 2020   0914 Case discussed with Dr Sal Mittal, on call trauma at [facility]  Recommends trauma transfer if severe pain  SBIRT 20yo+      Most Recent Value   SBIRT (22 yo +)   In order to provide better care to our patients, we are screening all of our patients for alcohol and drug use  Would it be okay to ask you these screening questions? No Filed at: 12/21/2020 0601                    MDM  Number of Diagnoses or Management Options  Closed head injury, initial encounter:   Fall, initial encounter:   Lightheadedness:   Rib fractures:   Scapular fracture:   Diagnosis management comments: Fall yesterday evening following episode of lightheadedness with head strike, L shoulder/posterior rib pain and significant mechanism  Leukocytosis and hyponatremia noted, however hyponatremia appears chronic in nature and similar to previous values  CT head/c-spine without acute intracranial process or fracture  Xrays of chest/l shoulder reveal numerous L sided rib fractures and scapular fracture, confirmed by CT  No hemothorax/pneumothorax   Pt is without respiratory distress or increased work of breathing here  However, significant pain only minimally relieved by fentanyl pre-hospital and in ED today  Following discussion with Dr Earnest Hoffman, on call trauma at [place] will transfer to [facility] for evaluation/pain control through trauma team        Amount and/or Complexity of Data Reviewed  Clinical lab tests: ordered  Tests in the radiology section of CPT®: ordered    Patient Progress  Patient progress: stable      Disposition  Final diagnoses:   Fall, initial encounter   Rib fractures   Scapular fracture   Closed head injury, initial encounter   Lightheadedness     Time reflects when diagnosis was documented in both MDM as applicable and the Disposition within this note     Time User Action Codes Description Comment    12/21/2020 10:35 AM Karrie [place] Add [L94  RQNV] Fall, initial encounter     12/21/2020 10:36 AM Karrie [place] Add [S22 39XA] Rib fracture     12/21/2020 10:36 AM Karrie [place] Remove [S22 39XA] Rib fracture     12/21/2020 10:36 AM Karrie [place] Add [S22 39XA] Rib fractures     12/21/2020 10:36 AM Karrie [place] Add [O01 423G] Scapular fracture     12/21/2020 10:36 AM Karrie [place] Add [S09 90XA] Closed head injury, initial encounter     12/21/2020 10:36 AM Karrie [place] Add [R42] Lightheadedness       ED Disposition     ED Disposition Condition Date/Time Comment    Transfer to Another Facility-In Network  Mon Dec 21, 2020 10:35 AM Hernan Velasquez should be transferred out to [address] [place] [address]          Follow-up Information    None         Discharge Medication List as of 12/21/2020 11:28 AM      CONTINUE these medications which have NOT CHANGED    Details   alendronate (Fosamax) 70 mg tablet Take 1 tablet (70 mg total) by mouth every 7 days, Starting Fri 9/4/2020, Normal      Aluminum & Magnesium Hydroxide (MAGNESIUM-ALUMINUM PO) Take by mouth, Historical Med      amLODIPine (NORVASC) 5 mg tablet One tablet MWF, 1/2 tablet other days, Normal      fluticasone (FLONASE) 50 mcg/act nasal spray 1 spray into each nostril daily, Starting Thu 12/3/2020, Normal      metoprolol succinate (TOPROL-XL) 100 mg 24 hr tablet Take 1 tablet (100 mg total) by mouth daily, Starting Tue 2/11/2020, Normal      olmesartan (BENICAR) 40 mg tablet Take 1 tablet (40 mg total) by mouth daily, Starting Thu 12/3/2020, Normal      !! predniSONE 1 mg tablet Use as directed, Normal      !! predniSONE 5 mg tablet Use 1-3 tablets daily or as directed, Normal      sertraline (ZOLOFT) 50 mg tablet Take 1 tablet (50 mg total) by mouth daily, Starting Tue 2/11/2020, Normal      spironolactone (ALDACTONE) 50 mg tablet Take 1 tablet (50 mg total) by mouth daily, Starting Tue 2/4/2020, Normal      albuterol (PROVENTIL HFA,VENTOLIN HFA) 90 mcg/act inhaler Inhale 2 puffs every 6 (six) hours as needed for wheezing or shortness of breath, Starting Thu 12/3/2020, No Print      Omega-3 1000 MG CAPS Take by mouth, Historical Med       !! - Potential duplicate medications found  Please discuss with provider  No discharge procedures on file      PDMP Review     None          ED Provider  Electronically Signed by           Enoch Waters PA-C  12/22/20 [address] [place], [place] [address]NICOLE  12/22/20 4436

## 2020-12-22 NOTE — PROGRESS NOTES
Subjective: No acute events overnight  No acute distress  Resting comfortably in bed    Objective:  A 10 point ROS was performed; negative except as noted above       Lab Results   Component Value Date/Time    WBC 19 75 (H) 12/21/2020 06:14 AM    HGB 13 3 12/21/2020 06:14 AM       Vitals:    12/22/20 0300   BP: 134/80   Pulse:    Resp:    Temp:    SpO2:      Left upper extremity  Sling in Place  TTP Posterior shoulder  Motor grossly intact to median, radial and ulnar nerve distributions  Sensation intact to light touch in m/r/u/mc/ax distributions  Digits warm and well perfused with brisk capillary refill     Assessment: 77 y o  female with left scapula fracture    Plan:  NWB LUE in sling  Will pursue non-operative treatment  Pain control  PT/OT  FU in outpatient office in 2 weeks  Dispo: Ortho signing off

## 2020-12-22 NOTE — ASSESSMENT & PLAN NOTE
-patient appears to have chronic hyponatremia likely related to alcohol use  -sodium run typically from 129-132  Sodium level improved today to 129    -will place continue on fluid restriction and started on IVF  -Recheck BMP in a m   - will need to follow up with PCP as an outpatient

## 2020-12-22 NOTE — CASE MANAGEMENT
CM facilitated SBIRT  Pt reports consuming ETOH every evening in the form of a "glass of wine or a high-ball"  Pt reports consuming 1-2 of these drinks before bed  Pt doesn't endorse blackouts or withdrawal symptoms  Pt's had no hx of IP/OP tx or DUI  CM offered HOST but pt declined  Pt is cleared for a home d/c when medically stable

## 2020-12-22 NOTE — PLAN OF CARE
Problem: PAIN - ADULT  Goal: Verbalizes/displays adequate comfort level or baseline comfort level  Description: Interventions:  - Encourage patient to monitor pain and request assistance  - Assess pain using appropriate pain scale  - Administer analgesics based on type and severity of pain and evaluate response  - Implement non-pharmacological measures as appropriate and evaluate response  - Consider cultural and social influences on pain and pain management  - Notify physician/advanced practitioner if interventions unsuccessful or patient reports new pain  Outcome: Progressing     Problem: INFECTION - ADULT  Goal: Absence or prevention of progression during hospitalization  Description: INTERVENTIONS:  - Assess and monitor for signs and symptoms of infection  - Monitor lab/diagnostic results  - Monitor all insertion sites, i e  indwelling lines, tubes, and drains  - Monitor endotracheal if appropriate and nasal secretions for changes in amount and color  - [place] appropriate cooling/warming therapies per order  - Administer medications as ordered  - Instruct and encourage patient and family to use good hand hygiene technique  - Identify and instruct in appropriate isolation precautions for identified infection/condition  Outcome: Progressing  Goal: Absence of fever/infection during neutropenic period  Description: INTERVENTIONS:  - Monitor WBC    Outcome: Progressing     Problem: SAFETY ADULT  Goal: Patient will remain free of falls  Description: INTERVENTIONS:  - Assess patient frequently for physical needs  -  Identify cognitive and physical deficits and behaviors that affect risk of falls    -  [place] fall precautions as indicated by assessment   - Educate patient/family on patient safety including physical limitations  - Instruct patient to call for assistance with activity based on assessment  - Modify environment to reduce risk of injury  - Consider OT/PT consult to assist with strengthening/mobility  Outcome: Progressing  Goal: Maintain or return to baseline ADL function  Description: INTERVENTIONS:  -  Assess patient's ability to carry out ADLs; assess patient's baseline for ADL function and identify physical deficits which impact ability to perform ADLs (bathing, care of mouth/teeth, toileting, grooming, dressing, etc )  - Assess/evaluate cause of self-care deficits   - Assess range of motion  - Assess patient's mobility; develop plan if impaired  - Assess patient's need for assistive devices and provide as appropriate  - Encourage maximum independence but intervene and supervise when necessary  - Involve family in performance of ADLs  - Assess for home care needs following discharge   - Consider OT consult to assist with ADL evaluation and planning for discharge  - Provide patient education as appropriate  Outcome: Progressing  Goal: Maintain or return mobility status to optimal level  Description: INTERVENTIONS:  - Assess patient's baseline mobility status (ambulation, transfers, stairs, etc )    - Identify cognitive and physical deficits and behaviors that affect mobility  - Identify mobility aids required to assist with transfers and/or ambulation (gait belt, sit-to-stand, lift, walker, cane, etc )  - [place] fall precautions as indicated by assessment  - Record patient progress and toleration of activity level on Mobility SBAR; progress patient to next Phase/Stage  - Instruct patient to call for assistance with activity based on assessment  - Consider rehabilitation consult to assist with strengthening/weightbearing, etc   Outcome: Progressing     Problem: DISCHARGE PLANNING  Goal: Discharge to home or other facility with appropriate resources  Description: INTERVENTIONS:  - Identify barriers to discharge w/patient and caregiver  - Arrange for needed discharge resources and transportation as appropriate  - Identify discharge learning needs (meds, wound care, etc )  - Arrange for interpretive services to assist at discharge as needed  - Refer to Case Management Department for coordinating discharge planning if the patient needs post-hospital services based on physician/advanced practitioner order or complex needs related to functional status, cognitive ability, or social support system  Outcome: Progressing     Problem: Knowledge Deficit  Goal: Patient/family/caregiver demonstrates understanding of disease process, treatment plan, medications, and discharge instructions  Description: Complete learning assessment and assess knowledge base  Interventions:  - Provide teaching at level of understanding  - Provide teaching via preferred learning methods  Outcome: Progressing     Problem: Potential for Falls  Goal: Patient will remain free of falls  Description: INTERVENTIONS:  - Assess patient frequently for physical needs  -  Identify cognitive and physical deficits and behaviors that affect risk of falls    -  [place] fall precautions as indicated by assessment   - Educate patient/family on patient safety including physical limitations  - Instruct patient to call for assistance with activity based on assessment  - Modify environment to reduce risk of injury  - Consider OT/PT consult to assist with strengthening/mobility  Outcome: Progressing     Problem: RESPIRATORY - ADULT  Goal: Achieves optimal ventilation and oxygenation  Description: INTERVENTIONS:  - Assess for changes in respiratory status  - Assess for changes in mentation and behavior  - Position to facilitate oxygenation and minimize respiratory effort  - Oxygen administered by appropriate delivery if ordered  - Initiate smoking cessation education as indicated  - Encourage broncho-pulmonary hygiene including cough, deep breathe, Incentive Spirometry  - Assess the need for suctioning and aspirate as needed  - Assess and instruct to report SOB or any respiratory difficulty  - Respiratory Therapy support as indicated  Outcome: Progressing     Problem: MUSCULOSKELETAL - ADULT  Goal: Maintain or return mobility to safest level of function  Description: INTERVENTIONS:  - Assess patient's ability to carry out ADLs; assess patient's baseline for ADL function and identify physical deficits which impact ability to perform ADLs (bathing, care of mouth/teeth, toileting, grooming, dressing, etc )  - Assess/evaluate cause of self-care deficits   - Assess range of motion  - Assess patient's mobility  - Assess patient's need for assistive devices and provide as appropriate  - Encourage maximum independence but intervene and supervise when necessary  - Involve family in performance of ADLs  - Assess for home care needs following discharge   - Consider OT consult to assist with ADL evaluation and planning for discharge  - Provide patient education as appropriate  Outcome: Progressing  Goal: Maintain proper alignment of affected body part  Description: INTERVENTIONS:  - Support, maintain and protect limb and body alignment  - Provide patient/ family with appropriate education  Outcome: Progressing

## 2020-12-22 NOTE — ASSESSMENT & PLAN NOTE
-resume home antihypertensives including amlodipine 5 mg on MWF and 2 5 mg all other days, Toprol  mg daily, Spironlactone 50 mg qhs  -continue to hold olesartan 40 mg daily, due this is possibly leading to her dizziness secondary to a combination of Ace, spironolactone, and alcohol  Blood pressure has been normotensive    Will continue to monitor blood pressure off ACE   - f/u with PCP

## 2020-12-22 NOTE — OCCUPATIONAL THERAPY NOTE
Occupational Therapy Evaluation     Patient Name: Mary Velasquez  EFIXC'F Date: 12/22/2020  Problem List  Principal Problem:    Fracture of multiple ribs of left side  Active Problems:    Hyponatremia    Fall    Scapula fracture    History of hypertension    Chronic alcohol abuse    History of depression    Acute pain due to trauma    Past Medical History  Past Medical History:   Diagnosis Date    Anxiety state 12/19/2014    Benign essential hypertension 1/14/2010    Chronic rhinitis 1/12/2012    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 hair loss 2/4/2020    Hyponatremia 10/2/2020    Major depressive disorder with single episode, in full remission ([facility] [address] ) 10/10/2019    Mild persistent asthma without complication 2/5/3708    Right chronic serous otitis media 11/7/2017    Added automatically from request for surgery [id number]    Temporal arteritis ([facility] [address] ) 9/4/2020     Past Surgical History  Past Surgical History:   Procedure Laterality Date    LAPAROSCOPY      X2 for endometriosis    SC TEMPORAL ARTERY LIGATN OR BX Left 8/14/2020    Procedure: TEMPORAL ARTERY BIOPSY;  Surgeon: Nicole Le MD;  Location: [address] [place] [address];  Service: General         12/22/20 1016   OT Last Visit   OT Visit Date 12/22/20   Note Type   Note type Evaluation   Restrictions/Precautions   Weight Bearing Precautions Per Order Yes   LUE Weight Bearing Per Order NWB  (IN SLING; OK FOR ELBOW/WRIST ROM )   Braces or Orthoses Sling   Other Precautions WBS;Pain   Pain Assessment   Pain Assessment Tool 0-10   Pain Score 5   Pain Location/Orientation Orientation: Left; Location: Rib Cage   Patient's Stated Pain Goal No pain   Hospital Pain Intervention(s) Repositioned; Ambulation/increased activity; Emotional support   Home Living   Type of [address] [place] [address] Two level;1/2 bath on main level;Stairs to enter with rails   Bathroom Shower/Tub Tub/shower unit   Bathroom Toilet Raised   Bathroom Accessibility Accessible   Home Equipment Crutches   Additional Comments NO USE OF DME AT BASELINE    Prior Function   Level of [place] Independent with ADLs and functional mobility   Lives With Spouse   Receives Help From Family   ADL Assistance Independent   IADLs Independent   Falls in the last 6 months 1 to 4  (2)   Vocational Retired   Lifestyle   Autonomy  [facility] ADLS/IADLS/DRIVING PTA    Reciprocal Relationships [facility]  Service to Others RETIRED;  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    Intrinsic Gratification ENJOYS SPENDING TIME WITH FAMILY    ADL   Eating Assistance 5  Supervision/Setup   Grooming Assistance 5  Supervision/Setup   UB Bathing Assistance 4  Minimal Assistance   LB Bathing Assistance 4  Minimal Assistance   UB Dressing Assistance 4  Minimal Assistance   LB Dressing Assistance 4  Minimal Assistance   Toileting Assistance  5  Supervision/Setup   Functional Assistance 5  Supervision/Setup   Bed Mobility   Supine to Sit 4  Minimal assistance   Additional items Assist x 1; Increased time required;Verbal cues   Sit to Supine Unable to assess  (PT LEFT OOB WITH ALL NEEDS IN REACH )   Transfers   Sit to Stand 5  Supervision   Additional items Assist x 1; Increased time required   Stand to Sit 5  Supervision   Additional items Assist x 1; Increased time required   Functional Mobility   Functional Mobility 5  Supervision   Balance   Static Sitting Fair +   Static Standing Fair   Ambulatory Fair -   Activity Tolerance   Activity Tolerance Patient limited by pain; Patient tolerated treatment well   Medical Staff Made Aware Hailey Yung PT  CM    Nurse Made Aware APPROPRIATE TO SEE PER RN    RUE Assessment   RUE Assessment WFL  (RHD )   LUE Assessment   LUE Assessment X  (NWB IN SLING )   Hand Function   Gross Motor Coordination Functional   Fine Motor Coordination Functional   Sensation   Light Touch No apparent deficits   Cognition   Overall Cognitive Status WFL   Arousal/Participation Alert; Cooperative   Attention Within functional limits Orientation Level Oriented X4   Memory Within functional limits   Following Commands Follows multistep commands without difficulty   Comments PT IS PLEASANT AND COOPERATIVE  G RECALL/CARRY OVER OF LEARNED PRECAUTIONS  Assessment   Assessment 78 YO Female SEEN FOR INITIAL OCCUPATIONAL THERAPY EVALUATION FOLLOWING ADMISSION TO [facility] S/P FALL RESULTING IN L SCAPULAR BODY FX AND L SIDED RIB FX  PT CURRENTLY HAS THE FOLLOWING RESTRICTIONS;LUE NWB STATUS   PROBLEMS LIST INCLUDES HTN, DEPRESSION, AMBULATORY DYSFUNCTION, ANXIETY, AND DEPRESSION  PT IS FROM HOME WITH SPOUSE WHERE SHE REPORTS BEING INDEPENDENT WITH ADLS/IADLS/DRIVING PTA  PT CURRENTLY REQUIRES OVERALL MIN A WITH ADLS, AND SUPERVISION WITH TRANSFERS /FUNCTIONAL MOBILITY WITHOUT USE OF AD  PT IS LIMITED 2' PAIN, FATIGUE, IMPAIRED BALANCE, WB RESTRICTIONS, ORTHOPEDIC RESTRICTIONS and OVERALL LIMITED ACTIVITY TOLERANCE  PT EDUCATED ON CARRY OVER OF WB STATUS, SLING MANAGEMENT, ONE HANDED DRESSING TECHNIQUES, DEEP BREATHING TECHNIQUES T/O ACTIVITY, SLOWING OF PACE, ENERGY CONSERVATION TECHNIQUES FOR CARRY OVER UPON D/C, INCREASED FAMILY SUPPORT and CONTINUE PARTICIPATION IN SELF-CARE/MOBILITY WITH STAFF WHILE IN THE HOSPITAL   FROM AN OCCUPATIONAL THERAPY PERSPECTIVE, PT CAN RETURN HOME WITH INCREASED FAMILY SUPPORT WHEN MEDICALLY CLEARED  ALL QUESTIONS/CONCERNS ADDRESSED  NO ADDITIONAL ACUTE CARE OT NEEDS  D/C OT      Goals   Patient Goals TO RETURN HOME    Recommendation   OT Discharge Recommendation Return to previous environment with social support   OT - OK to Discharge Yes   Modified David Scale   Modified David Scale 3       Documentation completed by LIZA Ponce, OTR/L

## 2020-12-22 NOTE — ASSESSMENT & PLAN NOTE
-Staining below stated injuries  -patient became dizzy prior to falling which he thinks is related to a antihypertensive that she has been taking for the last several months before bed  She states that after he takes medications she often times feels dizzy and lightheaded   -geriatrics consult and appreciated  -PT and OT evaluated  Note appreciated  Approved for return to previous environment  Will plan for discharge home tomorrow

## 2020-12-22 NOTE — CONSULTS
Consultation - Monster GUTIERREZMurielkrystin 77 y o  female MRN: [id number]  Unit/Bed#: [facility] 607-01 Encounter: [id number]      Assessment/Plan    Ambulatory dysfunction with fall  No previous falls  At rest secondary to polypharmacy, alcohol consumption  Recommend check orthostatic blood pressures  Fall precautions  PT/OT    Comminuted left scapular fracture  -s/p fall PTA  -confirmed on CT chest on admission  Ortho on consult  Sling   PT/OT  -continue acute pain control    Multiple rib fractures (L 3-7)  -confirmed on CTH chest on admission   -acute rib fracture protocol  -continue to encourage ISS and aggressive pulmonray toiled  -follow-up CXR completed, final read pending   Trauma following    Acute pain due to trauma  -continue pain control per Geriatric pain protocol:  Tylenol 975mg Q8H scheduled  Roxicodone 2 5mg Q4H PRN moderate pain  Roxicodone 5mg Q4H PRN severe pain  Dilaudid 0 2mg  PRN  -continue adjuncts such as Gabapentin 100mg HS and lidocaine patch topically  -encourage addition of non-pharmacologic pain treatment including ice and frequent repositioning  -recommend  bowel regimen to prevent and treat constipation due to increased risk with acute pain and opiate pain medications    Impaired Vision  -requires use of contacts, encourage use of contacts/corrective lenses at all appropriate times  -encourage adequate lighting and encourage use of assistance with ambulation  -keep personal belongings close to person to avoid reaching  -encourage appropriate footwear at all times    Daily alcohol use   No signs of withdrawal  -consider CIWA protocol    Deconditioning/debility/frailty  Mild  Risk factors: hospitalization, fall, polypharmacy  maintain protein in diet  Keep physically mentally and socially active  Maintain caregiver support    Delirium precautions  -alert and orient x3 at baseline  Patient is high risk of delirium due to age, fall with traumatic injury, acute pain and hospitalization  -Initiate delirium precautions  -maintain normal sleep/wake cycle  -minimize overnight interruptions, group overnight vitals/labs/nursing checks as possible  -dim lights, close blinds and turn off tv to minimize stimulation and encourage sleep environment in evenings  -ensure that pain is well controlled   -monitor for fecal and urinary retention which may precipitate delirium  -encourage early mobilization and ambulation once cleared by PT/OT and with assistance  -provide frequent reorientation and redirection as appropriate   -limit use of medications which may precipitate or worsen delirium such as tramadol, anticholinergics, and benadryl   -encourage hydration and nutrition   -redirect unwanted behaviors as first line, avoid physical restraints, use chemical restraint only if all other attempts have been unsuccessful, would consider Zyprexa 2 5mg IM Q, monitor for orthostatic hypotension and QTc prolongation with repeat dosing, recommend lowest dose possible for shortest duration possible, QTc 429 12/21/20    Home medication review  See separate progress note for list of medications from [facility]      History of Present Illness   Physician Requesting Consult: Jorge Sommer MD  Reason for Consult / Principal Problem: Fall  Hx and PE limited by: N/A    HPI: Ambrose Velasquez is a 77y o  year old female with HTN, depression, ambulatory dysfunction, anxiety, and mild persistent asthma who is admitted to the trauma service with ambulatory dysfunction and fall found to have left scapula and multiple left sided rib fractures  She is being seen in consultation by Geriatrics for high risk of developing delirium  Prior to admission she was residing home with her  and was independent with ADLs/iADLs  She wears contact lenses, does not use hearing aid or denture and does not use assist device for ambulation at baseline however has had at least three additional falls this past year    She denies issues with insomnia, urination or constipation at baseline  She likes to do crossword puzzles  Upon exam patient is out of bed in recliner chair  She is alert orient x3  With sling to left upper arm  She states that she has not had a bowel movement since admission  Inpatient consult to Gerontology  Consult performed by: ROBERT Heath  Consult ordered by: Jerica Hobbs PA-C          Review of Systems   Constitutional: Negative for unexpected weight change  HENT: Negative for hearing loss  Eyes: Negative for visual disturbance  Respiratory: Negative for cough  Cardiovascular: Negative for chest pain  Gastrointestinal: Positive for constipation  Genitourinary: Negative for difficulty urinating  Musculoskeletal: Positive for gait problem  Skin: Negative for color change  Neurological: Negative for dizziness  Psychiatric/Behavioral: Negative for sleep disturbance         Historical Information   Past Medical History:   Diagnosis Date    Anxiety state 12/19/2014    Benign essential hypertension 1/14/2010    Chronic rhinitis 1/12/2012    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 hair loss 2/4/2020    Hyponatremia 10/2/2020    Major depressive disorder with single episode, in full remission ([facility] [address] ) 10/10/2019    Mild persistent asthma without complication 3/2/0720    Right chronic serous otitis media 11/7/2017    Added automatically from request for surgery [id number]    Temporal arteritis ([facility] [address] ) 9/4/2020     Past Surgical History:   Procedure Laterality Date    LAPAROSCOPY      X2 for endometriosis    [place] TEMPORAL ARTERY LIGATN OR BX Left 8/14/2020    Procedure: TEMPORAL ARTERY BIOPSY;  Surgeon: Jonh Mari MD;  Location: [address] [place] [address] OR;  Service: General     Social History   Social History     Substance and Sexual Activity   Alcohol Use Yes    Frequency: 4 or more times a week    Drinks per session: 1 or 2    Binge frequency: Never    Comment: socially     Social History     Substance and Sexual Activity   Drug Use Never     Social History     Tobacco Use   Smoking Status Never Smoker   Smokeless Tobacco Never Used     Family History:   Family History   Problem Relation Age of Onset    Coronary artery disease Mother     Coronary artery disease Father     No Known Problems Maternal Grandmother     No Known Problems Maternal Grandfather     Leukemia Paternal Grandmother 71    No Known Problems Paternal Grandfather     No Known Problems Son     No Known Problems Son        Meds/Allergies   Current meds:   Current Facility-Administered Medications   Medication Dose Route Frequency    acetaminophen (TYLENOL) tablet 975 mg  975 mg Oral Q8H [facility]    albuterol (PROVENTIL HFA,VENTOLIN HFA) inhaler 2 puff  2 puff Inhalation Q6H PRN    amLODIPine (NORVASC) tablet 2 5 mg  2 5 mg Oral Once per day on Sun Tue Thu Sat    [START ON 12/23/2020] amLODIPine (NORVASC) tablet 5 mg  5 mg Oral Once per day on Mon Wed Fri    enoxaparin (LOVENOX) subcutaneous injection 30 mg  30 mg Subcutaneous Q12H [facility]    fish oil capsule 1,000 mg  1,000 mg Oral Daily    fluticasone (FLONASE) 50 mcg/act nasal spray 1 spray  1 spray Nasal Daily    folic acid (FOLVITE) tablet 1 mg  1 mg Oral Daily    gabapentin (NEURONTIN) capsule 300 mg  300 mg Oral HS    HYDROmorphone (DILAUDID) injection 0 2 mg  0 2 mg Intravenous Q4H PRN    methocarbamol (ROBAXIN) tablet 500 mg  500 mg Oral Q6H [facility]    metoprolol succinate (TOPROL-XL) 24 hr tablet 100 mg  100 mg Oral Daily    multivitamin-minerals (CENTRUM) tablet 1 tablet  1 tablet Oral Daily    ondansetron (ZOFRAN) injection 4 mg  4 mg Intravenous Q6H PRN    oxyCODONE (ROXICODONE) IR tablet 2 5 mg  2 5 mg Oral Q4H PRN    oxyCODONE (ROXICODONE) IR tablet 5 mg  5 mg Oral Q4H PRN    predniSONE tablet 12 mg  12 mg Oral Daily    sertraline (ZOLOFT) tablet 50 mg  50 mg Oral Daily    spironolactone (ALDACTONE) tablet 50 mg  50 mg Oral Daily    thiamine (VITAMIN B1) tablet 100 mg  100 mg Oral Daily      Current PTA meds:  Medications Prior to Admission   Medication    albuterol (PROVENTIL HFA,VENTOLIN HFA) 90 mcg/act inhaler    alendronate (Fosamax) 70 mg tablet    Aluminum & Magnesium Hydroxide (MAGNESIUM-ALUMINUM PO)    amLODIPine (NORVASC) 5 mg tablet    fluticasone (FLONASE) 50 mcg/act nasal spray    metoprolol succinate (TOPROL-XL) 100 mg 24 hr tablet    olmesartan (BENICAR) 40 mg tablet    Omega-3 1000 MG CAPS    predniSONE 1 mg tablet    predniSONE 5 mg tablet    sertraline (ZOLOFT) 50 mg tablet    spironolactone (ALDACTONE) 50 mg tablet      No Known Allergies    Objective   Vitals: Blood pressure 130/81, pulse 71, temperature 98 1 °F (36 7 °C), resp  rate 18, height 5' 3" (1 6 m), weight 85 7 kg (189 lb), SpO2 95 %, not currently breastfeeding  ,Body mass index is 33 48 kg/m²  Physical Exam  Vitals signs and nursing note reviewed  HENT:      Head: Normocephalic  Mouth/Throat:      Mouth: Mucous membranes are moist    Eyes:      General:         Right eye: No discharge  Left eye: No discharge  Neck:      Musculoskeletal: Normal range of motion  Cardiovascular:      Rate and Rhythm: Normal rate  Heart sounds: Normal heart sounds  Pulmonary:      Effort: No respiratory distress  Breath sounds: Normal breath sounds  Abdominal:      General: Bowel sounds are normal       Palpations: Abdomen is soft  Musculoskeletal: Normal range of motion  General: No swelling  Skin:     General: Skin is warm and dry  Neurological:      General: No focal deficit present  Mental Status: She is alert and oriented to person, place, and time     Psychiatric:         Mood and Affect: Mood normal          Lab Results:   Results from last 7 days   Lab Units 12/22/20  0445   WBC Thousand/uL 9 01   HEMOGLOBIN g/dL 12 4   HEMATOCRIT % 36 2   PLATELETS Thousands/uL 228        Results from last 7 days   Lab Units 12/21/20  1254   POTASSIUM mmol/L 4 2  4 2 CHLORIDE mmol/L 93*  95*   CO2 mmol/L 26  25   BUN mg/dL 13  13   CREATININE mg/dL 0 68  0 69   CALCIUM mg/dL 9 3  9 2   ALK PHOS U/L 49   ALT U/L 32   AST U/L 33     Imaging Studies: I have personally reviewed pertinent reports  EKG, Pathology, and Other Studies: I have personally reviewed pertinent reports      VTE Prophylaxis: Sequential compression device (Venodyne)     Code Status: Level 1 - Full Code

## 2020-12-23 VITALS
HEART RATE: 72 BPM | HEIGHT: 63 IN | RESPIRATION RATE: 19 BRPM | WEIGHT: 189 LBS | DIASTOLIC BLOOD PRESSURE: 76 MMHG | TEMPERATURE: 98.5 F | OXYGEN SATURATION: 93 % | SYSTOLIC BLOOD PRESSURE: 133 MMHG | BODY MASS INDEX: 33.49 KG/M2

## 2020-12-23 LAB
ANION GAP SERPL CALCULATED.3IONS-SCNC: 5 MMOL/L (ref 4–13)
BUN SERPL-MCNC: 12 MG/DL (ref 5–25)
CALCIUM SERPL-MCNC: 8.7 MG/DL (ref 8.3–10.1)
CHLORIDE SERPL-SCNC: 101 MMOL/L (ref 100–108)
CO2 SERPL-SCNC: 28 MMOL/L (ref 21–32)
CREAT SERPL-MCNC: 0.67 MG/DL (ref 0.6–1.3)
GFR SERPL CREATININE-BSD FRML MDRD: 91 ML/MIN/1.73SQ M
GLUCOSE SERPL-MCNC: 88 MG/DL (ref 65–140)
POTASSIUM SERPL-SCNC: 4.2 MMOL/L (ref 3.5–5.3)
SODIUM SERPL-SCNC: 134 MMOL/L (ref 136–145)

## 2020-12-23 PROCEDURE — 80048 BASIC METABOLIC PNL TOTAL CA: CPT | Performed by: NURSE PRACTITIONER

## 2020-12-23 PROCEDURE — 99238 HOSP IP/OBS DSCHRG MGMT 30/<: CPT | Performed by: NURSE PRACTITIONER

## 2020-12-23 RX ORDER — OLMESARTAN MEDOXOMIL 40 MG/1
20 TABLET ORAL DAILY
Qty: 90 TABLET | Refills: 0 | Status: SHIPPED | OUTPATIENT
Start: 2020-12-23 | End: 2021-01-29 | Stop reason: ALTCHOICE

## 2020-12-23 RX ORDER — OXYCODONE HYDROCHLORIDE 5 MG/1
2.5 TABLET ORAL EVERY 4 HOURS PRN
Qty: 15 TABLET | Refills: 0 | Status: SHIPPED | OUTPATIENT
Start: 2020-12-23 | End: 2020-12-30 | Stop reason: ALTCHOICE

## 2020-12-23 RX ADMIN — THIAMINE HCL TAB 100 MG 100 MG: 100 TAB at 09:18

## 2020-12-23 RX ADMIN — AMLODIPINE BESYLATE 5 MG: 5 TABLET ORAL at 09:17

## 2020-12-23 RX ADMIN — METOPROLOL SUCCINATE 100 MG: 100 TABLET, EXTENDED RELEASE ORAL at 09:18

## 2020-12-23 RX ADMIN — METHOCARBAMOL 500 MG: 500 TABLET ORAL at 13:01

## 2020-12-23 RX ADMIN — OXYCODONE HYDROCHLORIDE 5 MG: 5 TABLET ORAL at 16:14

## 2020-12-23 RX ADMIN — OXYCODONE HYDROCHLORIDE 5 MG: 5 TABLET ORAL at 05:00

## 2020-12-23 RX ADMIN — Medication 1 TABLET: at 09:18

## 2020-12-23 RX ADMIN — SODIUM CHLORIDE 75 ML/HR: 0.9 INJECTION, SOLUTION INTRAVENOUS at 05:04

## 2020-12-23 RX ADMIN — SERTRALINE HYDROCHLORIDE 50 MG: 50 TABLET ORAL at 09:17

## 2020-12-23 RX ADMIN — METHOCARBAMOL 500 MG: 500 TABLET ORAL at 06:14

## 2020-12-23 RX ADMIN — ACETAMINOPHEN 975 MG: 325 TABLET, FILM COATED ORAL at 13:01

## 2020-12-23 RX ADMIN — FLUTICASONE PROPIONATE 1 SPRAY: 50 SPRAY, METERED NASAL at 09:18

## 2020-12-23 RX ADMIN — FOLIC ACID 1 MG: 1 TABLET ORAL at 09:18

## 2020-12-23 RX ADMIN — SPIRONOLACTONE 50 MG: 50 TABLET, FILM COATED ORAL at 09:17

## 2020-12-23 RX ADMIN — PREDNISONE 12 MG: 1 TABLET ORAL at 09:18

## 2020-12-23 RX ADMIN — Medication 1000 MG: at 09:17

## 2020-12-23 RX ADMIN — METHOCARBAMOL 500 MG: 500 TABLET ORAL at 00:56

## 2020-12-23 RX ADMIN — ACETAMINOPHEN 975 MG: 325 TABLET, FILM COATED ORAL at 05:00

## 2020-12-23 NOTE — ASSESSMENT & PLAN NOTE
-patient appears to have chronic hyponatremia likely related to alcohol use  -sodium run typically from 129-132  Sodium level improved today to 134  -will place continue on fluid restriction and started on IVF  -will need to follow up with PCP as an outpatient

## 2020-12-23 NOTE — ASSESSMENT & PLAN NOTE
-Staining below stated injuries  -patient became dizzy prior to falling which he thinks is related to a antihypertensive that she has been taking for the last several months before bed  She states that after he takes medications she often times feels dizzy and lightheaded   -geriatrics consult and appreciated  -PT and OT evaluated  Note appreciated  Approved for return to previous environment    Will plan for discharge home today

## 2020-12-23 NOTE — DISCHARGE SUMMARY
Discharge- Dino Velasquez 1955, 77 y o  female MRN: [id number]    Unit/Bed#: [facility] 607-01 Encounter: [id number]    Primary Care Provider: Fara Gray MD   Date and time admitted to hospital: 12/21/2020 11:53 AM        Acute pain due to trauma  Assessment & Plan  -multimodal analgesic regimen including scheduled Tylenol, gabapentin, methocarbamol  Oxycodone and IV Dilaudid available for moderate to severe pain in breakthrough pain as well, respectively     -bowel regimen ordered as well    History of depression  Assessment & Plan  - resume home zoloft  - per patient, this is well controlled    Chronic alcohol abuse  Assessment & Plan  -patient states she drinks at least 1 alcohol beverage prior to bed every evening  -denies history of alcohol withdrawal  -will place on CIWA protocol  -will place on folic acid, thiamine, and multivitamin  -will monitor for signs and symptoms of alcohol withdrawal    History of hypertension  Assessment & Plan  -resume home antihypertensives including amlodipine 5 mg on MWF and 2 5 mg all other days, Toprol  mg daily, Spironlactone 50 mg qhs  -continue to hold olesartan 40 mg daily, due this is possibly leading to her dizziness secondary to a combination of Ace, spironolactone, and alcohol  Blood pressure has been normotensive  Will continue to monitor blood pressure off ACE   - f/u with PCP    Scapula fracture  Assessment & Plan  -nonweightbearing to the left upper extremity  -sling to Left upper extremity  Cannot sling for elbow and wrist range of motion  - there was concern on left humerus x-ray for a "Cortical irregularity in the region of the neck of the left humerus concerning for a nondisplaced fracture" a CT was recommended  Will discuss with Orthopedics    -pain control  -PT and OT    Fall  Assessment & Plan  -Staining below stated injuries  -patient became dizzy prior to falling which he thinks is related to a antihypertensive that she has been taking for the last several months before bed  She states that after he takes medications she often times feels dizzy and lightheaded   -geriatrics consult and appreciated  -PT and OT evaluated  Note appreciated  Approved for return to previous environment  Will plan for discharge home today    Hyponatremia  Assessment & Plan  -patient appears to have chronic hyponatremia likely related to alcohol use  -sodium run typically from 129-132  Sodium level improved today to 134  -will place continue on fluid restriction and started on IVF  -will need to follow up with PCP as an outpatient  * Fracture of multiple ribs of left side  Assessment & Plan  - left rib fractures 3-7  -On CT scan, no evidence of hemothorax or pneumothorax  -repeat chest x-ray is unremarkable   -Rib fracture protocol with multimodal analgesic regimen  -IS/airway clearance protocol    DVT prophylaxis: SCDs and Lovenox  PT and OT: eval and treat    Disposition:  Discharge home today  Outpatient follow-up with PCP and trauma  Spoke with primary care provider prior to discharge  At this time with the Benicar we will half the dose  Patient follow-up with the primary care provider next week to discuss hospital stay  Subjective:  No acute complaints    Objective:  General:  No acute distress  HEENT:  Normocephalic  Cardiac:  Regular rate and rhythm  Lungs:  CTA bilaterally  Abdomen:  Nontender, nondistended  Neuro: GCS 15  Extremities:  Moving all extremities  Skin:  Warm, dry, intact      Resolved Problems  Date Reviewed: 12/23/2020    None          Admission Date:   Admission Orders (From admission, onward)     Ordered        12/21/20 1226  Inpatient Admission  Once                     Admitting Diagnosis: Scapula fracture [S42 109A]  Injury, unspecified, initial encounter [T14 90XA]  Unspecified multiple injuries, initial encounter [T07  XXXA]    HPI: Per Jonetta Buerger, "Pearl Velasquez is a 77 y o  female who presents following fall down stairs sustaining left 3rd through 7th rib fractures a left scapular fracture  She was initially seen at [facility] and transferred the [facility] after finding the injuries  She states she will walking upstairs and everything went black causing her to fall  She attributes this to an antihypertensive, olmesartan, which she has been taking for the last several months in the evening  She also admits to drinking alcohol on a nightly basis and wonders if this may be contributing as well  She is having pain in her left chest wall posteriorly as well as in her left shoulder blade  She did strike her head  She does not take any blood thinners or anti-platelet agent  Her  is present with her at bedside  Mechanism:Fall"    Procedures Performed: No orders of the defined types were placed in this encounter  Summary of Hospital Course:  Patient is a 26-year-old female comes in for evaluation status post fall  She was noted have multiple rib fractures  She was also noted to have hyponatremia upon admission  Chronically the patient remained between 129-132  She would have outpatient follow-up that would be recommended with her primary care provider  Would hold her ACE-inhibitor at this time and continue her spironolactone and other antihypertensives  Patient was discharged in stable condition with outpatient follow-up with the trauma team as well  Significant Findings, Care, Treatment and Services Provided: Xr Chest 1 View Portable    Result Date: 12/21/2020  Impression: No acute cardiopulmonary disease  Question subtle deformities of several posterior left ribs which could be due to nondisplaced fractures  No pneumothorax or hemothorax  The study was marked in [facility] for immediate notification  Workstation performed: [id number]     Xr Chest Pa & Lateral    Result Date: 12/22/2020  Impression: No acute cardiopulmonary disease   Workstation performed: [id number]     Xr Shoulder 2+ Views Left    Result Date: 12/21/2020  Impression: Comminuted fracture of the left scapula  There are acute left rib fractures seen  Workstation performed: [id number]     Xr Humerus Left    Result Date: 12/21/2020  Impression: 1  Comminuted fracture of the left scapula inferior to the glenoid  2   There are several acute left rib fractures  3   Cortical irregularity in the region of the neck of the left humerus concerning for a nondisplaced fracture  CT scan of the shoulder would be helpful  The study was marked in EPIC for significant notification  Workstation performed: [id number]     Ct Head Without Contrast    Result Date: 12/21/2020  Impression: No acute intracranial abnormality  Left posterior scalp hematoma  Workstation performed: [id number]     Ct Spine Cervical Without Contrast    Result Date: 12/21/2020  Impression: No cervical spine fracture or traumatic malalignment  Workstation performed: [id number]     Ct Chest Abdomen Pelvis W Contrast    Result Date: 12/21/2020  Impression: 1  Acute fractures of the left lateral 3rd through 7th ribs  Comminuted fracture of the left scapula  2   No acute visceral injury within the chest, abdomen, or pelvis  Workstation performed: [id number]       Complications: no complications    Condition at Discharge: good         Discharge instructions/Information to patient and family:   See after visit summary for information provided to patient and family  Provisions for Follow-Up Care:  See after visit summary for information related to follow-up care and any pertinent home health orders  PCP: Steff Foster MD    Disposition: Home    Planned Readmission: No    Discharge Statement   I spent 23 minutes discharging the patient  This time was spent on the day of discharge  I had direct contact with the patient on the day of discharge  Additional documentation is required if more than 30 minutes were spent on discharge       Discharge Medications:  See after visit summary for reconciled discharge medications provided to patient and family

## 2020-12-23 NOTE — PLAN OF CARE
Problem: PAIN - ADULT  Goal: Verbalizes/displays adequate comfort level or baseline comfort level  Description: Interventions:  - Encourage patient to monitor pain and request assistance  - Assess pain using appropriate pain scale  - Administer analgesics based on type and severity of pain and evaluate response  - Implement non-pharmacological measures as appropriate and evaluate response  - Consider cultural and social influences on pain and pain management  - Notify physician/advanced practitioner if interventions unsuccessful or patient reports new pain  Outcome: Completed     Problem: INFECTION - ADULT  Goal: Absence or prevention of progression during hospitalization  Description: INTERVENTIONS:  - Assess and monitor for signs and symptoms of infection  - Monitor lab/diagnostic results  - Monitor all insertion sites, i e  indwelling lines, tubes, and drains  - Monitor endotracheal if appropriate and nasal secretions for changes in amount and color  - [place] appropriate cooling/warming therapies per order  - Administer medications as ordered  - Instruct and encourage patient and family to use good hand hygiene technique  - Identify and instruct in appropriate isolation precautions for identified infection/condition  Outcome: Completed  Goal: Absence of fever/infection during neutropenic period  Description: INTERVENTIONS:  - Monitor WBC    Outcome: Completed     Problem: SAFETY ADULT  Goal: Patient will remain free of falls  Description: INTERVENTIONS:  - Assess patient frequently for physical needs  -  Identify cognitive and physical deficits and behaviors that affect risk of falls    -  [place] fall precautions as indicated by assessment   - Educate patient/family on patient safety including physical limitations  - Instruct patient to call for assistance with activity based on assessment  - Modify environment to reduce risk of injury  - Consider OT/PT consult to assist with strengthening/mobility  Outcome: Completed  Goal: Maintain or return to baseline ADL function  Description: INTERVENTIONS:  -  Assess patient's ability to carry out ADLs; assess patient's baseline for ADL function and identify physical deficits which impact ability to perform ADLs (bathing, care of mouth/teeth, toileting, grooming, dressing, etc )  - Assess/evaluate cause of self-care deficits   - Assess range of motion  - Assess patient's mobility; develop plan if impaired  - Assess patient's need for assistive devices and provide as appropriate  - Encourage maximum independence but intervene and supervise when necessary  - Involve family in performance of ADLs  - Assess for home care needs following discharge   - Consider OT consult to assist with ADL evaluation and planning for discharge  - Provide patient education as appropriate  Outcome: Completed  Goal: Maintain or return mobility status to optimal level  Description: INTERVENTIONS:  - Assess patient's baseline mobility status (ambulation, transfers, stairs, etc )    - Identify cognitive and physical deficits and behaviors that affect mobility  - Identify mobility aids required to assist with transfers and/or ambulation (gait belt, sit-to-stand, lift, walker, cane, etc )  - [place] fall precautions as indicated by assessment  - Record patient progress and toleration of activity level on Mobility SBAR; progress patient to next Phase/Stage  - Instruct patient to call for assistance with activity based on assessment  - Consider rehabilitation consult to assist with strengthening/weightbearing, etc   Outcome: Completed     Problem: DISCHARGE PLANNING  Goal: Discharge to home or other facility with appropriate resources  Description: INTERVENTIONS:  - Identify barriers to discharge w/patient and caregiver  - Arrange for needed discharge resources and transportation as appropriate  - Identify discharge learning needs (meds, wound care, etc )  - Arrange for interpretive services to assist at discharge as needed  - Refer to Case Management Department for coordinating discharge planning if the patient needs post-hospital services based on physician/advanced practitioner order or complex needs related to functional status, cognitive ability, or social support system  Outcome: Completed     Problem: Knowledge Deficit  Goal: Patient/family/caregiver demonstrates understanding of disease process, treatment plan, medications, and discharge instructions  Description: Complete learning assessment and assess knowledge base  Interventions:  - Provide teaching at level of understanding  - Provide teaching via preferred learning methods  Outcome: Completed     Problem: Potential for Falls  Goal: Patient will remain free of falls  Description: INTERVENTIONS:  - Assess patient frequently for physical needs  -  Identify cognitive and physical deficits and behaviors that affect risk of falls    -  [place] fall precautions as indicated by assessment   - Educate patient/family on patient safety including physical limitations  - Instruct patient to call for assistance with activity based on assessment  - Modify environment to reduce risk of injury  - Consider OT/PT consult to assist with strengthening/mobility  Outcome: Completed     Problem: RESPIRATORY - ADULT  Goal: Achieves optimal ventilation and oxygenation  Description: INTERVENTIONS:  - Assess for changes in respiratory status  - Assess for changes in mentation and behavior  - Position to facilitate oxygenation and minimize respiratory effort  - Oxygen administered by appropriate delivery if ordered  - Initiate smoking cessation education as indicated  - Encourage broncho-pulmonary hygiene including cough, deep breathe, Incentive Spirometry  - Assess the need for suctioning and aspirate as needed  - Assess and instruct to report SOB or any respiratory difficulty  - Respiratory Therapy support as indicated  Outcome: Completed Problem: MUSCULOSKELETAL - ADULT  Goal: Maintain or return mobility to safest level of function  Description: INTERVENTIONS:  - Assess patient's ability to carry out ADLs; assess patient's baseline for ADL function and identify physical deficits which impact ability to perform ADLs (bathing, care of mouth/teeth, toileting, grooming, dressing, etc )  - Assess/evaluate cause of self-care deficits   - Assess range of motion  - Assess patient's mobility  - Assess patient's need for assistive devices and provide as appropriate  - Encourage maximum independence but intervene and supervise when necessary  - Involve family in performance of ADLs  - Assess for home care needs following discharge   - Consider OT consult to assist with ADL evaluation and planning for discharge  - Provide patient education as appropriate  Outcome: Completed  Goal: Maintain proper alignment of affected body part  Description: INTERVENTIONS:  - Support, maintain and protect limb and body alignment  - Provide patient/ family with appropriate education  Outcome: Completed

## 2020-12-23 NOTE — DISCHARGE INSTRUCTIONS
Discharge Instructions - Orthopedics  Amajeremias Velasquez 77 y o  female MRN: [id number]  Unit/Bed#: [facility] 607-01    Weight Bearing Status:                                           Non weight bearing left upper extremity in sling  Can come out of sling for gentle wrist and elbow range of motion  Hold off on shoulder range of motion until cleared by orthopedic surgery  Pain:  Continue analgesics as directed    Dressing Instructions:   Please keep clean, dry and intact until follow up     Appt Instructions: If you do not have your appointment, please call the clinic at [phone] t  Otherwise followup as scheduled with Dr Yoselin Tobar in 2 weeks  Contact the office sooner if you experience any increased numbness/tingling in the extremities  Miscellaneous:        Rib Fractures:  Seek medical attention if you develop worsening chest pain or shortness of breath, dizziness/lightheadness, fevers/chills or sweats  No heavy lifting, pushing or pulling >10 pounds and no strenuous physical activity until cleared by trauma  No work or driving while taking narcotic pain medications and until cleared by trauma  Use your incentive spirometer at least hourly while awake       Primary Care Provider Instructions:   - your family doctor will call you on 12/24/2020 regarding your follow-up appointment  - please for your blood pressure medications take half of your Benicar at 20 mg daily  - please call our office with any further questions or concerns regarding home medication regimen

## 2020-12-23 NOTE — PROGRESS NOTES
Pastoral Care Progress Note    2020  Patient: Quin Velasquez : 1955  Admission Date & Time: 2020 1153  MRN: [id number] [facility]: [id number]                     Chaplaincy Interventions Utilized:   Empowerment: Encouraged focus on present, Encouraged self-care, Normalized experience of patient/family and Provided chaplaincy education    Exploration: Explored hope, Explored emotional needs & resources and Explored spiritual needs & resources    Relationship Building: Cultivated a relationship of care and support and Listened empathically    Chaplaincy Outcomes Achieved:  Expressed gratitude    Spiritual Coping Strategies Utilized:   Connectedness       20 1100   Clinical Encounter Type   Visited With Patient   Routine Visit Introduction

## 2020-12-24 ENCOUNTER — TELEMEDICINE (OUTPATIENT)
Dept: FAMILY MEDICINE CLINIC | Facility: CLINIC | Age: 65
End: 2020-12-24
Payer: MEDICARE

## 2020-12-24 ENCOUNTER — TRANSITIONAL CARE MANAGEMENT (OUTPATIENT)
Dept: FAMILY MEDICINE CLINIC | Facility: CLINIC | Age: 65
End: 2020-12-24

## 2020-12-24 DIAGNOSIS — Z79.52 CURRENT CHRONIC USE OF SYSTEMIC STEROIDS: ICD-10-CM

## 2020-12-24 DIAGNOSIS — R55 SYNCOPE, UNSPECIFIED SYNCOPE TYPE: Primary | ICD-10-CM

## 2020-12-24 DIAGNOSIS — S42.102D CLOSED FRACTURE OF LEFT SCAPULA WITH ROUTINE HEALING, UNSPECIFIED PART OF SCAPULA, SUBSEQUENT ENCOUNTER: ICD-10-CM

## 2020-12-24 DIAGNOSIS — E87.1 HYPONATREMIA: ICD-10-CM

## 2020-12-24 DIAGNOSIS — I10 BENIGN ESSENTIAL HYPERTENSION: ICD-10-CM

## 2020-12-24 DIAGNOSIS — S22.42XD CLOSED FRACTURE OF MULTIPLE RIBS OF LEFT SIDE WITH ROUTINE HEALING, SUBSEQUENT ENCOUNTER: ICD-10-CM

## 2020-12-24 DIAGNOSIS — R42 INTERMITTENT LIGHTHEADEDNESS: ICD-10-CM

## 2020-12-24 DIAGNOSIS — G89.11 ACUTE PAIN DUE TO TRAUMA: ICD-10-CM

## 2020-12-24 PROCEDURE — 99496 TRANSJ CARE MGMT HIGH F2F 7D: CPT | Performed by: FAMILY MEDICINE

## 2020-12-30 ENCOUNTER — OFFICE VISIT (OUTPATIENT)
Dept: FAMILY MEDICINE CLINIC | Facility: CLINIC | Age: 65
End: 2020-12-30
Payer: MEDICARE

## 2020-12-30 VITALS
OXYGEN SATURATION: 94 % | SYSTOLIC BLOOD PRESSURE: 128 MMHG | HEIGHT: 63 IN | WEIGHT: 187.2 LBS | BODY MASS INDEX: 33.17 KG/M2 | HEART RATE: 72 BPM | DIASTOLIC BLOOD PRESSURE: 74 MMHG | TEMPERATURE: 96 F

## 2020-12-30 DIAGNOSIS — S42.102D CLOSED FRACTURE OF LEFT SCAPULA WITH ROUTINE HEALING, UNSPECIFIED PART OF SCAPULA, SUBSEQUENT ENCOUNTER: ICD-10-CM

## 2020-12-30 DIAGNOSIS — E87.1 HYPONATREMIA: ICD-10-CM

## 2020-12-30 DIAGNOSIS — S22.42XD CLOSED FRACTURE OF MULTIPLE RIBS OF LEFT SIDE WITH ROUTINE HEALING, SUBSEQUENT ENCOUNTER: Primary | ICD-10-CM

## 2020-12-30 DIAGNOSIS — I10 BENIGN ESSENTIAL HYPERTENSION: ICD-10-CM

## 2020-12-30 DIAGNOSIS — R42 LIGHTHEADEDNESS: ICD-10-CM

## 2020-12-30 PROCEDURE — 99214 OFFICE O/P EST MOD 30 MIN: CPT | Performed by: FAMILY MEDICINE

## 2020-12-30 RX ORDER — OLMESARTAN MEDOXOMIL 20 MG/1
20 TABLET ORAL DAILY
Qty: 30 TABLET | Refills: 3 | Status: SHIPPED | OUTPATIENT
Start: 2020-12-30 | End: 2021-03-24

## 2021-01-04 ENCOUNTER — TELEPHONE (OUTPATIENT)
Dept: FAMILY MEDICINE CLINIC | Facility: CLINIC | Age: 66
End: 2021-01-04

## 2021-01-04 NOTE — TELEPHONE ENCOUNTER
Patient is still getting a little dizzy but she was supposed to be taking half of the 20 mg  Olmesartan and was taking the whole tablet  Advised to cut the 20 mg tablet in half and check her blood pressures

## 2021-01-05 ENCOUNTER — TELEPHONE (OUTPATIENT)
Dept: FAMILY MEDICINE CLINIC | Facility: CLINIC | Age: 66
End: 2021-01-05

## 2021-01-05 DIAGNOSIS — S22.42XD CLOSED FRACTURE OF MULTIPLE RIBS OF LEFT SIDE WITH ROUTINE HEALING, SUBSEQUENT ENCOUNTER: ICD-10-CM

## 2021-01-05 DIAGNOSIS — S42.102D CLOSED FRACTURE OF LEFT SCAPULA WITH ROUTINE HEALING, UNSPECIFIED PART OF SCAPULA, SUBSEQUENT ENCOUNTER: Primary | ICD-10-CM

## 2021-01-05 NOTE — TELEPHONE ENCOUNTER
Referral given to see Dr Nicko Gilliland for rehab of her left shoulder status post rib and scapular fractures

## 2021-01-05 NOTE — TELEPHONE ENCOUNTER
Patient called requesting a phone call from you  Patient states that she has some questions for you regarding rehab for her broken ribs

## 2021-01-06 ENCOUNTER — OFFICE VISIT (OUTPATIENT)
Dept: OBGYN CLINIC | Facility: HOSPITAL | Age: 66
End: 2021-01-06

## 2021-01-06 ENCOUNTER — HOSPITAL ENCOUNTER (OUTPATIENT)
Dept: RADIOLOGY | Facility: HOSPITAL | Age: 66
Discharge: HOME | End: 2021-01-06
Attending: ORTHOPAEDIC SURGERY
Payer: MEDICARE

## 2021-01-06 VITALS
WEIGHT: 189 LBS | HEIGHT: 63 IN | DIASTOLIC BLOOD PRESSURE: 76 MMHG | BODY MASS INDEX: 33.49 KG/M2 | HEART RATE: 66 BPM | SYSTOLIC BLOOD PRESSURE: 118 MMHG

## 2021-01-06 DIAGNOSIS — S42.192D CLOSED FRACTURE OF OTHER PART OF LEFT SCAPULA WITH ROUTINE HEALING, SUBSEQUENT ENCOUNTER: ICD-10-CM

## 2021-01-06 DIAGNOSIS — S42.192D CLOSED FRACTURE OF OTHER PART OF LEFT SCAPULA WITH ROUTINE HEALING, SUBSEQUENT ENCOUNTER: Primary | ICD-10-CM

## 2021-01-06 PROCEDURE — 99024 POSTOP FOLLOW-UP VISIT: CPT | Performed by: ORTHOPAEDIC SURGERY

## 2021-01-06 PROCEDURE — 73030 X-RAY EXAM OF SHOULDER: CPT

## 2021-01-06 NOTE — PROGRESS NOTES
Orthopaedics Office Visit - New Patient Visit    ASSESSMENT/PLAN:    Assessment:   Left scapular fracture, healing, satisfactory alignment    Plan:     Plan for closed treatment of left scapula fracture without manipulation  Per most recent version of [facility]ractures in Adults, plan for the following:  -Begin PT for passive range of motion at 2 wks, aim to regain full PROM by 4 wks after injury  -Starting at 4 wks post injury, progress to active assist and full AROM exercises with goal to restore full AROM by 8 wks post injury  -Starting at 12 wks post injury, may begin rotator cuff and parascapular muscle strengthening  -Lift all restrictions at 16 wks  Follow-up in 4 weeks    To Do Next Visit:   x-ray imaging    _____________________________________________________  CHIEF COMPLAINT:  No chief complaint on file  SUBJECTIVE:  Earle Velasquez is a 77 y o  female who presents to the office for evaluation of left scapular fracture from a fall down 7 steps on 12/21/20  Patient states that her pain is getting much better this time  She has been compliant with sling restrictions at this time, only taking it off to sleep  She has been moving her wrist and elbow without limitations  She denies any repeat injuries  She denies any numbness or tingling in the upper extremity  She denies any other acute complaints      PAST MEDICAL HISTORY:  Past Medical History:   Diagnosis Date    Anxiety state 12/19/2014    Benign essential hypertension 1/14/2010    Chronic rhinitis 1/12/2012    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 hair loss 2/4/2020    Fracture of multiple ribs of left side 12/21/2020    Hyponatremia 10/2/2020    Major depressive disorder with single episode, in full remission ([address] ) 10/10/2019    Mild persistent asthma without complication 0/9/3551    Right chronic serous otitis media 11/7/2017    Added automatically from request for surgery [id number]    Scapula fracture 12/21/2020    Temporal arteritis ([facility] [address] ) 9/4/2020       PAST SURGICAL HISTORY:  Past Surgical History:   Procedure Laterality Date    LAPAROSCOPY      X2 for endometriosis    [place] TEMPORAL ARTERY LIGATN OR BX Left 8/14/2020    Procedure: TEMPORAL ARTERY BIOPSY;  Surgeon: Soo Irwin MD;  Location: [facility] MAIN OR;  Service: General       FAMILY HISTORY:  Family History   Problem Relation Age of Onset    Coronary artery disease Mother     Coronary artery disease Father     No Known Problems Maternal Grandmother     No Known Problems Maternal Grandfather     Leukemia Paternal Grandmother 71    No Known Problems Paternal Grandfather     No Known Problems Son     No Known Problems Son        SOCIAL HISTORY:  Social History     Tobacco Use    Smoking status: Never Smoker    Smokeless tobacco: Never Used   Substance Use Topics    Alcohol use: Yes     Frequency: 4 or more times a week     Drinks per session: 1 or 2     Binge frequency: Never     Comment: socially    Drug use: Never       MEDICATIONS:    Current Outpatient Medications:     albuterol (PROVENTIL HFA,VENTOLIN HFA) 90 mcg/act inhaler, Inhale 2 puffs every 6 (six) hours as needed for wheezing or shortness of breath, Disp: 1 Inhaler, Rfl: 5    alendronate (Fosamax) 70 mg tablet, Take 1 tablet (70 mg total) by mouth every 7 days, Disp: 12 tablet, Rfl: 3    Aluminum & Magnesium Hydroxide (MAGNESIUM-ALUMINUM PO), Take by mouth, Disp: , Rfl:     amLODIPine (NORVASC) 5 mg tablet, One tablet MWF, 1/2 tablet other days, Disp: 90 tablet, Rfl: 3    fluticasone (FLONASE) 50 mcg/act nasal spray, 1 spray into each nostril daily, Disp: 3 Bottle, Rfl: 3    metoprolol succinate (TOPROL-XL) 100 mg 24 hr tablet, Take 1 tablet (100 mg total) by mouth daily, Disp: 90 tablet, Rfl: 3    olmesartan (Benicar) 20 mg tablet, Take 1 tablet (20 mg total) by mouth daily (Patient taking differently: Take 20 mg by mouth daily tyake 1/2 tablet oral rout daily), Disp: 30 tablet, Rfl: 3    Omega-3 1000 MG CAPS, Take by mouth, Disp: , Rfl:     predniSONE 1 mg tablet, Use as directed, Disp: 200 tablet, Rfl: 5    predniSONE 5 mg tablet, Use 1-3 tablets daily or as directed (Patient taking differently: 20 mg Use 1/2 tablet daily), Disp: 100 tablet, Rfl: 5    sertraline (ZOLOFT) 50 mg tablet, Take 1 tablet (50 mg total) by mouth daily, Disp: 90 tablet, Rfl: 3    spironolactone (ALDACTONE) 50 mg tablet, Take 1 tablet (50 mg total) by mouth daily, Disp: 90 tablet, Rfl: 5    olmesartan (BENICAR) 40 mg tablet, Take 0 5 tablets (20 mg total) by mouth daily (Patient not taking: Reported on 1/6/2021), Disp: 90 tablet, Rfl: 0    ALLERGIES:  No Known Allergies    REVIEW OF SYSTEMS:  MSK:  Left shoulder pain  Neuro:  No numbness or tingling  Pertinent items are otherwise noted in HPI  A comprehensive review of systems was otherwise negative  LABS:  HgA1c: No results found for: HGBA1C  BMP:   Lab Results   Component Value Date    CALCIUM 8 7 12/23/2020    K 4 2 12/23/2020    CO2 28 12/23/2020     12/23/2020    BUN 12 12/23/2020    CREATININE 0 67 12/23/2020     CBC: No components found for: CBC    _____________________________________________________  PHYSICAL EXAMINATION:  Vital signs: /76   Pulse 66   Ht 5' 3" (1 6 m)   Wt 85 7 kg (189 lb)   BMI 33 48 kg/m²   General: No acute distress, awake and alert  Psychiatric: Mood and affect appear appropriate  HEENT: Trachea Midline, No torticollis, no apparent facial trauma  Cardiovascular: No audible murmurs;  Extremities appear perfused  Pulmonary: No audible wheezing or stridor  Skin: No open lesions; see further details (if any) below    MUSCULOSKELETAL EXAMINATION:  Extremities:  Left upper extremity  No visible deformity  Tenderness over the scapula  Full flexion at the elbow, extension to 20°  Full pronation and supination  Full range of motion of the wrist  Motor and sensation intact along median, ulnar, and radial nerve distribution  2+ radial pulse  _____________________________________________________  STUDIES REVIEWED:  I personally reviewed the images and interpretation is as follows:      PROCEDURES PERFORMED:  Fracture / Dislocation Treatment    Date/Time: 1/6/2021 10:34 AM  Performed by: Solo Garcia MD  Authorized by: Solo Garcia MD     Patient Location:  Clinic  Injury location:  Shoulder  Location details:  Left shoulder  Injury type:  Fracture  Fracture type: scapular    Neurovascular status: Neurovascularly intact    Manipulation performed?: No          NICOLE Chong MD

## 2021-01-22 ENCOUNTER — LAB (OUTPATIENT)
Dept: LAB | Age: 66
End: 2021-01-22
Payer: MEDICARE

## 2021-01-22 DIAGNOSIS — I10 BENIGN ESSENTIAL HYPERTENSION: ICD-10-CM

## 2021-01-22 LAB
ANION GAP SERPL CALCULATED.3IONS-SCNC: 7 MMOL/L (ref 4–13)
BUN SERPL-MCNC: 10 MG/DL (ref 5–25)
CALCIUM SERPL-MCNC: 10.4 MG/DL (ref 8.3–10.1)
CHLORIDE SERPL-SCNC: 99 MMOL/L (ref 100–108)
CO2 SERPL-SCNC: 28 MMOL/L (ref 21–32)
CREAT SERPL-MCNC: 0.78 MG/DL (ref 0.6–1.3)
GFR SERPL CREATININE-BSD FRML MDRD: 79 ML/MIN/1.73SQ M
GLUCOSE SERPL-MCNC: 89 MG/DL (ref 65–140)
POTASSIUM SERPL-SCNC: 4.5 MMOL/L (ref 3.5–5.3)
SODIUM SERPL-SCNC: 134 MMOL/L (ref 136–145)

## 2021-01-22 PROCEDURE — 36415 COLL VENOUS BLD VENIPUNCTURE: CPT

## 2021-01-22 PROCEDURE — 80048 BASIC METABOLIC PNL TOTAL CA: CPT

## 2021-01-26 PROBLEM — F10.20 ALCOHOL DEPENDENCE (HCC): Status: ACTIVE | Noted: 2020-12-21

## 2021-01-29 ENCOUNTER — OFFICE VISIT (OUTPATIENT)
Dept: FAMILY MEDICINE CLINIC | Facility: CLINIC | Age: 66
End: 2021-01-29
Payer: MEDICARE

## 2021-01-29 VITALS
DIASTOLIC BLOOD PRESSURE: 80 MMHG | WEIGHT: 187.4 LBS | BODY MASS INDEX: 33.2 KG/M2 | SYSTOLIC BLOOD PRESSURE: 120 MMHG | OXYGEN SATURATION: 97 % | RESPIRATION RATE: 18 BRPM | HEART RATE: 56 BPM | TEMPERATURE: 97.6 F | HEIGHT: 63 IN

## 2021-01-29 DIAGNOSIS — S42.102D CLOSED FRACTURE OF LEFT SCAPULA WITH ROUTINE HEALING, UNSPECIFIED PART OF SCAPULA, SUBSEQUENT ENCOUNTER: ICD-10-CM

## 2021-01-29 DIAGNOSIS — I10 BENIGN ESSENTIAL HYPERTENSION: Primary | ICD-10-CM

## 2021-01-29 DIAGNOSIS — M31.6 TEMPORAL ARTERITIS (HCC): ICD-10-CM

## 2021-01-29 DIAGNOSIS — S22.42XD CLOSED FRACTURE OF MULTIPLE RIBS OF LEFT SIDE WITH ROUTINE HEALING, SUBSEQUENT ENCOUNTER: ICD-10-CM

## 2021-01-29 PROCEDURE — 99214 OFFICE O/P EST MOD 30 MIN: CPT | Performed by: FAMILY MEDICINE

## 2021-01-29 NOTE — PROGRESS NOTES
Chief Complaint   Patient presents with    Follow-up     left arm pain     Temp     96 7        HPI   HPI    here for follow-up of multiple rib fractures, left scapular fracture, and head contusion, hypertension and hyponatremia  Continues to go to therapy and feels that she is 85% better  Still gets some discomfort in the left neck and left scapular area  Present dose of olmesartan is 1/2 of the 20 mg tablet  Not lightheaded but occasionally feels wonky  Current dose of prednisone is 9 mg  No headaches  Got her 1st COVID-19 vaccine  Past Medical History:   Diagnosis Date    Anxiety state 12/19/2014    Benign essential hypertension 1/14/2010    Chronic rhinitis 1/12/2012    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 hair loss 2/4/2020    Fracture of multiple ribs of left side 12/21/2020    Hyponatremia 10/2/2020    Major depressive disorder with single episode, in full remission ([facility] [address] ) 10/10/2019    Mild persistent asthma without complication 5/3/7999    Right chronic serous otitis media 11/7/2017    Added automatically from request for surgery [id number]    Scapula fracture 12/21/2020    Temporal arteritis ([facility] [address] ) 9/4/2020        Past Surgical History:   Procedure Laterality Date    LAPAROSCOPY      X2 for endometriosis    [place] TEMPORAL ARTERY LIGATN OR BX Left 8/14/2020    Procedure: TEMPORAL ARTERY BIOPSY;  Surgeon: Nahed Kellogg MD;  Location[facility] MAIN OR;  Service: General       Social History     Tobacco Use    Smoking status: Never Smoker    Smokeless tobacco: Never Used   Substance Use Topics    Alcohol use: Yes     Frequency: 4 or more times a week     Drinks per session: 1 or 2     Binge frequency: Never     Comment: socially       Social History     Social History Narrative     since 1981  Two sons  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, gardening, walking, needle work, painting, writing books          The following portions of the patient's history were reviewed and updated as appropriate: allergies, current medications, past family history, past medical history, past social history, past surgical history and problem list       Review of Systems   Constitutional: Negative for activity change and appetite change  HENT: Negative for ear pain and hearing loss  Eyes: Negative for visual disturbance  Respiratory: Negative for shortness of breath and wheezing  Cardiovascular: Negative for chest pain and leg swelling  Gastrointestinal: Negative for abdominal pain, constipation and diarrhea  Genitourinary: Negative for difficulty urinating  Musculoskeletal: Negative for arthralgias and back pain  Skin: Negative for rash  Neurological: Negative for headaches  Psychiatric/Behavioral: Negative for dysphoric mood  The patient is not nervous/anxious  /80 (BP Location: Left arm, Patient Position: Sitting, Cuff Size: Adult)   Pulse 56   Temp 97 6 °F (36 4 °C) (Temporal)   Resp 18   Ht 5' 3" (1 6 m)   Wt 85 kg (187 lb 6 4 oz)   SpO2 97%   BMI 33 20 kg/m²      Physical Exam     Repeat blood pressure 138/80  Somewhat cushingoid  There is also prominence of fat in the   Right trapezius area below her neck more so than on the left  lungs are clear  Heart regular  There is no palpable tenderness over the left scapula  She is able to abduct the left shoulder without difficulty  Mood is okay  Sodium 134              Current Outpatient Medications:     alendronate (Fosamax) 70 mg tablet, Take 1 tablet (70 mg total) by mouth every 7 days, Disp: 12 tablet, Rfl: 3    Aluminum & Magnesium Hydroxide (MAGNESIUM-ALUMINUM PO), Take by mouth, Disp: , Rfl:     amLODIPine (NORVASC) 5 mg tablet, One tablet MWF, 1/2 tablet other days, Disp: 90 tablet, Rfl: 3    fluticasone (FLONASE) 50 mcg/act nasal spray, 1 spray into each nostril daily, Disp: 3 Bottle, Rfl: 3    metoprolol succinate (TOPROL-XL) 100 mg 24 hr tablet, Take 1 tablet (100 mg total) by mouth daily, Disp: 90 tablet, Rfl: 3    olmesartan (Benicar) 20 mg tablet, Take 1 tablet (20 mg total) by mouth daily (Patient taking differently: Take 20 mg by mouth daily tyake 1/2 tablet oral rout daily), Disp: 30 tablet, Rfl: 3    Omega-3 1000 MG CAPS, Take by mouth, Disp: , Rfl:     predniSONE 1 mg tablet, Use as directed, Disp: 200 tablet, Rfl: 5    predniSONE 5 mg tablet, Use 1-3 tablets daily or as directed (Patient taking differently: 20 mg Use 1/2 tablet daily), Disp: 100 tablet, Rfl: 5    sertraline (ZOLOFT) 50 mg tablet, Take 1 tablet (50 mg total) by mouth daily, Disp: 90 tablet, Rfl: 3    spironolactone (ALDACTONE) 50 mg tablet, Take 1 tablet (50 mg total) by mouth daily, Disp: 90 tablet, Rfl: 5    albuterol (PROVENTIL HFA,VENTOLIN HFA) 90 mcg/act inhaler, Inhale 2 puffs every 6 (six) hours as needed for wheezing or shortness of breath (Patient not taking: Reported on 1/29/2021), Disp: 1 Inhaler, Rfl: 5     No problem-specific Assessment & Plan notes found for this encounter  Diagnoses and all orders for this visit:    Benign essential hypertension    Temporal arteritis (HCC)    Closed fracture of left scapula with routine healing, unspecified part of scapula, subsequent encounter    Closed fracture of multiple ribs of left side with routine healing, subsequent encounter        Patient Instructions   Blood pressure is well controlled and sodium is just about normal     Blood pressure medications remain the same  Making progress as far as her scapula and rib fractures  Continue with therapy  Decreased prednisone to 7 mg daily for 2 weeks, then 6 mg for 2 weeks, then 5 mg and decrease by 1 mg every month  Suspect the fat pad near the left shoulder will gradually resolve as the prednisone is tapered  Recheck in 2 months

## 2021-01-31 DIAGNOSIS — S42.192D CLOSED FRACTURE OF OTHER PART OF LEFT SCAPULA WITH ROUTINE HEALING, SUBSEQUENT ENCOUNTER: Primary | ICD-10-CM

## 2021-02-01 DIAGNOSIS — I10 UNCONTROLLED HYPERTENSION: ICD-10-CM

## 2021-02-01 DIAGNOSIS — F32.5 MAJOR DEPRESSIVE DISORDER WITH SINGLE EPISODE, IN FULL REMISSION (HCC): ICD-10-CM

## 2021-02-01 RX ORDER — METOPROLOL SUCCINATE 100 MG/1
TABLET, EXTENDED RELEASE ORAL
Qty: 90 TABLET | Refills: 3 | Status: SHIPPED | OUTPATIENT
Start: 2021-02-01 | End: 2022-01-31

## 2021-02-10 ENCOUNTER — OFFICE VISIT (OUTPATIENT)
Dept: OBGYN CLINIC | Facility: HOSPITAL | Age: 66
End: 2021-02-10

## 2021-02-10 ENCOUNTER — HOSPITAL ENCOUNTER (OUTPATIENT)
Dept: RADIOLOGY | Facility: HOSPITAL | Age: 66
Discharge: HOME | End: 2021-02-10
Attending: ORTHOPAEDIC SURGERY
Payer: MEDICARE

## 2021-02-10 VITALS
BODY MASS INDEX: 32.78 KG/M2 | HEIGHT: 63 IN | SYSTOLIC BLOOD PRESSURE: 160 MMHG | WEIGHT: 185 LBS | HEART RATE: 69 BPM | DIASTOLIC BLOOD PRESSURE: 83 MMHG

## 2021-02-10 DIAGNOSIS — S42.192D CLOSED FRACTURE OF OTHER PART OF LEFT SCAPULA WITH ROUTINE HEALING, SUBSEQUENT ENCOUNTER: ICD-10-CM

## 2021-02-10 DIAGNOSIS — S42.192D CLOSED FRACTURE OF OTHER PART OF LEFT SCAPULA WITH ROUTINE HEALING, SUBSEQUENT ENCOUNTER: Primary | ICD-10-CM

## 2021-02-10 PROCEDURE — 99024 POSTOP FOLLOW-UP VISIT: CPT | Performed by: ORTHOPAEDIC SURGERY

## 2021-02-10 PROCEDURE — 73030 X-RAY EXAM OF SHOULDER: CPT

## 2021-02-10 NOTE — PROGRESS NOTES
Assessment:   Diagnosis ICD-10-CM Associated Orders   1  Closed fracture of other part of left scapula with routine healing, subsequent encounter  S42 [id number] Ambulatory referral to Physical Therapy       Plan:  X-rays taken reviewed, physical exam performed  Radiographically her scapular fracture is healing however clinically she is doing very well  At this point recommend initiating physical therapy with range of motion as tolerated  In the presence of her great motion and absence of any pain will initiate gradual strengthening exercises to her left upper extremity  Weightbearing activities as tolerated her left upper extremity  To do next visit:  Return in about 6 weeks (around 3/24/2021) for re-check with x-rays two views left shoulder  The above stated was discussed in layman's terms and the patient expressed understanding  All questions were answered to the patient's satisfaction  Scribe Attestation    I,:  Marcelo Borja am acting as a scribe while in the presence of the attending physician :       I,:  Jade Long MD personally performed the services described in this documentation    as scribed in my presence :             Subjective:   Ladan Velasquez is a 74 y o  female who presents repeat evaluation of her left scapula fracture due to a fall she sustained 12/21/2020  She returns today without any pain and with great shoulder motion  She is somewhat concerned that her left shoulder upon appearance when looking in a mirror is lower than her right side  She does state that her bra strap does fall off her left shoulder  She has no nighttime pain        Review of systems negative unless otherwise specified in HPI  Review of Systems    Past Medical History:   Diagnosis Date    Anxiety state 12/19/2014    Benign essential hypertension 1/14/2010    Chronic rhinitis 1/12/2012    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 hair loss 2/4/2020    Fracture of multiple ribs of left side 12/21/2020    Hyponatremia 10/2/2020  Major depressive disorder with single episode, in full remission ([facility] [address] ) 10/10/2019    Mild persistent asthma without complication 6/5/3742    Right chronic serous otitis media 11/7/2017    Added automatically from request for surgery [id number]    Scapula fracture 12/21/2020    Temporal arteritis ([facility] [address] ) 9/4/2020       Past Surgical History:   Procedure Laterality Date    LAPAROSCOPY      X2 for endometriosis    [place] TEMPORAL ARTERY LIGATN OR BX Left 8/14/2020    Procedure: TEMPORAL ARTERY BIOPSY;  Surgeon: Trace Velarde MD;  Location: [facility] MAIN OR;  Service: General       Family History   Problem Relation Age of Onset    Coronary artery disease Mother     Coronary artery disease Father     No Known Problems Maternal Grandmother     No Known Problems Maternal Grandfather     Leukemia Paternal Grandmother 71    No Known Problems Paternal Grandfather     No Known Problems Son     No Known Problems Son        Social History     Occupational History    Not on file   Tobacco Use    Smoking status: Never Smoker    Smokeless tobacco: Never Used   Substance and Sexual Activity    Alcohol use: Yes     Frequency: 4 or more times a week     Drinks per session: 1 or 2     Binge frequency: Never     Comment: socially    Drug use: Never    Sexual activity: Not Currently         Current Outpatient Medications:     albuterol (PROVENTIL HFA,VENTOLIN HFA) 90 mcg/act inhaler, Inhale 2 puffs every 6 (six) hours as needed for wheezing or shortness of breath, Disp: 1 Inhaler, Rfl: 5    alendronate (Fosamax) 70 mg tablet, Take 1 tablet (70 mg total) by mouth every 7 days, Disp: 12 tablet, Rfl: 3    Aluminum & Magnesium Hydroxide (MAGNESIUM-ALUMINUM PO), Take by mouth, Disp: , Rfl:     amLODIPine (NORVASC) 5 mg tablet, One tablet MWF, 1/2 tablet other days, Disp: 90 tablet, Rfl: 3    fluticasone (FLONASE) 50 mcg/act nasal spray, 1 spray into each nostril daily, Disp: 3 Bottle, Rfl: 3    metoprolol succinate (TOPROL-XL) 100 mg 24 hr tablet, TAKE 1 TABLET BY MOUTH EVERY DAY, Disp: 90 tablet, Rfl: 3    olmesartan (Benicar) 20 mg tablet, Take 1 tablet (20 mg total) by mouth daily (Patient taking differently: Take 20 mg by mouth daily tyake 1/2 tablet oral rout daily), Disp: 30 tablet, Rfl: 3    Omega-3 1000 MG CAPS, Take by mouth, Disp: , Rfl:     predniSONE 1 mg tablet, Use as directed, Disp: 200 tablet, Rfl: 5    predniSONE 5 mg tablet, Use 1-3 tablets daily or as directed (Patient taking differently: 20 mg Use 1/2 tablet daily), Disp: 100 tablet, Rfl: 5    sertraline (ZOLOFT) 50 mg tablet, TAKE 1 TABLET BY MOUTH EVERY DAY, Disp: 90 tablet, Rfl: 3    spironolactone (ALDACTONE) 50 mg tablet, Take 1 tablet (50 mg total) by mouth daily, Disp: 90 tablet, Rfl: 5    No Known Allergies         Vitals:    02/10/21 1004   BP: 160/83   Pulse: 69       Objective:                    Left Shoulder Exam     Tenderness   The patient is experiencing no tenderness  Range of Motion   The patient has normal left shoulder ROM  Other   Erythema: absent  Sensation: normal     Comments:    Mild rotator cuff weakness  There is some atrophy of her left deltoid and trapezius musculature with mild depression upon appearance of her left shoulder compared to right however the left shoulder is clinically as well as radiographically located            Diagnostics, reviewed and taken today if performed as documented: The attending physician has personally reviewed the pertinent films in PACS and interpretation is as follows:    Left shoulder x-rays taken and reviewed in the office today and show:  Healing scapular fracture in acceptable position alignment, callus formation present  Procedures, if performed today:    Procedures    None performed      Portions of the record may have been created with voice recognition software    Occasional wrong word or "sound a like" substitutions may have occurred due to the inherent limitations of voice recognition software  Read the chart carefully and recognize, using context, where substitutions have occurred

## 2021-03-21 DIAGNOSIS — S42.192D CLOSED FRACTURE OF OTHER PART OF LEFT SCAPULA WITH ROUTINE HEALING, SUBSEQUENT ENCOUNTER: Primary | ICD-10-CM

## 2021-03-24 DIAGNOSIS — I10 BENIGN ESSENTIAL HYPERTENSION: ICD-10-CM

## 2021-03-24 RX ORDER — OLMESARTAN MEDOXOMIL 20 MG/1
TABLET ORAL
Qty: 90 TABLET | Refills: 3 | Status: SHIPPED | OUTPATIENT
Start: 2021-03-24 | End: 2021-04-08 | Stop reason: SDUPTHER

## 2021-03-25 ENCOUNTER — HOSPITAL ENCOUNTER (OUTPATIENT)
Dept: RADIOLOGY | Facility: HOSPITAL | Age: 66
Discharge: HOME | End: 2021-03-25
Attending: ORTHOPAEDIC SURGERY
Payer: MEDICARE

## 2021-03-25 ENCOUNTER — OFFICE VISIT (OUTPATIENT)
Dept: OBGYN CLINIC | Facility: HOSPITAL | Age: 66
End: 2021-03-25
Payer: MEDICARE

## 2021-03-25 VITALS
BODY MASS INDEX: 32.53 KG/M2 | HEART RATE: 54 BPM | WEIGHT: 183.6 LBS | DIASTOLIC BLOOD PRESSURE: 83 MMHG | HEIGHT: 63 IN | SYSTOLIC BLOOD PRESSURE: 145 MMHG

## 2021-03-25 DIAGNOSIS — S42.192D CLOSED FRACTURE OF OTHER PART OF LEFT SCAPULA WITH ROUTINE HEALING, SUBSEQUENT ENCOUNTER: ICD-10-CM

## 2021-03-25 DIAGNOSIS — S42.192D CLOSED FRACTURE OF OTHER PART OF LEFT SCAPULA WITH ROUTINE HEALING, SUBSEQUENT ENCOUNTER: Primary | ICD-10-CM

## 2021-03-25 PROCEDURE — 73030 X-RAY EXAM OF SHOULDER: CPT

## 2021-03-25 PROCEDURE — 99213 OFFICE O/P EST LOW 20 MIN: CPT | Performed by: ORTHOPAEDIC SURGERY

## 2021-03-25 NOTE — PROGRESS NOTES
Orthopaedics Office Visit - Follow up Patient Visit    ASSESSMENT/PLAN:    Assessment:   3 months s/p left scapula fracture due to a fall she sustained 12/21/2020  Plan:   - Patient will continue with ROM exercises of the shoulder  - Ok to begin strengthening of the shoulder  - Motrin / tylenol as needed  - Ice / heat as directed   - Follow up PRN       To Do Next Visit:  Prn     _____________________________________________________  CHIEF COMPLAINT:  Chief Complaint   Patient presents with    Left Shoulder - Fracture, Follow-up         SUBJECTIVE:  Kaveh Velasquez is a 74 y o  female who presents 3 months s/p left scapula fracture due to a fall she sustained 12/21/2020  Patient states that her scapula is doing well overall  Patient states that she has return to normal activities with minimal pain  Patient states that she has been attending gym sessions for rehab with no additional pain associated with the exercise  The patient states she has not been taking a type pain medication  Patient has not been icing or heating the extremity  Patient denies any numbness or tingling in the hand  Patient denies any shortness of breath chest tightness chest pain  Patient offers no other complaints at this time       PAST MEDICAL HISTORY:  Past Medical History:   Diagnosis Date    Anxiety state 12/19/2014    Benign essential hypertension 1/14/2010    Chronic rhinitis 1/12/2012    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 hair loss 2/4/2020    Fracture of multiple ribs of left side 12/21/2020    Hyponatremia 10/2/2020    Major depressive disorder with single episode, in full remission ([address] ) 10/10/2019    Mild persistent asthma without complication 5/8/5778    Right chronic serous otitis media 11/7/2017    Added automatically from request for surgery [id number]    Scapula fracture 12/21/2020    Temporal arteritis ([address] ) 9/4/2020       PAST SURGICAL HISTORY:  Past Surgical History:   Procedure Laterality Date    LAPAROSCOPY      X2 for endometriosis    [place] TEMPORAL ARTERY LIGATN OR BX Left 8/14/2020    Procedure: TEMPORAL ARTERY BIOPSY;  Surgeon: Vi Griggs MD;  Location: [address] [place] [address] OR;  Service: General       FAMILY HISTORY:  Family History   Problem Relation Age of Onset    Coronary artery disease Mother     Coronary artery disease Father     No Known Problems Maternal Grandmother     No Known Problems Maternal Grandfather     Leukemia Paternal Grandmother 71    No Known Problems Paternal Grandfather     No Known Problems Son     No Known Problems Son        SOCIAL HISTORY:  Social History     Tobacco Use    Smoking status: Never Smoker    Smokeless tobacco: Never Used   Substance Use Topics    Alcohol use: Yes     Frequency: 4 or more times a week     Drinks per session: 1 or 2     Binge frequency: Never     Comment: socially    Drug use: Never       MEDICATIONS:    Current Outpatient Medications:     albuterol (PROVENTIL HFA,VENTOLIN HFA) 90 mcg/act inhaler, Inhale 2 puffs every 6 (six) hours as needed for wheezing or shortness of breath, Disp: 1 Inhaler, Rfl: 5    alendronate (Fosamax) 70 mg tablet, Take 1 tablet (70 mg total) by mouth every 7 days, Disp: 12 tablet, Rfl: 3    Aluminum & Magnesium Hydroxide (MAGNESIUM-ALUMINUM PO), Take by mouth, Disp: , Rfl:     amLODIPine (NORVASC) 5 mg tablet, One tablet MWF, 1/2 tablet other days, Disp: 90 tablet, Rfl: 3    fluticasone (FLONASE) 50 mcg/act nasal spray, 1 spray into each nostril daily, Disp: 3 Bottle, Rfl: 3    metoprolol succinate (TOPROL-XL) 100 mg 24 hr tablet, TAKE 1 TABLET BY MOUTH EVERY DAY, Disp: 90 tablet, Rfl: 3    olmesartan (BENICAR) 20 mg tablet, TAKE 1 TABLET BY MOUTH EVERY DAY, Disp: 90 tablet, Rfl: 3    Omega-3 1000 MG CAPS, Take by mouth, Disp: , Rfl:     predniSONE 1 mg tablet, Use as directed, Disp: 200 tablet, Rfl: 5    predniSONE 5 mg tablet, Use 1-3 tablets daily or as directed (Patient taking differently: 20 mg Use 1/2 tablet daily), Disp: 100 tablet, Rfl: 5    sertraline (ZOLOFT) 50 mg tablet, TAKE 1 TABLET BY MOUTH EVERY DAY, Disp: 90 tablet, Rfl: 3    spironolactone (ALDACTONE) 50 mg tablet, Take 1 tablet (50 mg total) by mouth daily, Disp: 90 tablet, Rfl: 5    ALLERGIES:  No Known Allergies    REVIEW OF SYSTEMS:  MSK: no pain today  Neuro: WNL   Pertinent items are otherwise noted in HPI  A comprehensive review of systems was otherwise negative  LABS:  HgA1c: No results found for: HGBA1C  BMP:   Lab Results   Component Value Date    CALCIUM 10 4 (H) 01/22/2021    K 4 5 01/22/2021    CO2 28 01/22/2021    CL 99 (L) 01/22/2021    BUN 10 01/22/2021    CREATININE 0 78 01/22/2021     CBC: No components found for: CBC    _____________________________________________________  PHYSICAL EXAMINATION:  Vital signs: /83   Pulse (!) 54   Ht 5' 3" (1 6 m)   Wt 83 3 kg (183 lb 9 6 oz)   BMI 32 52 kg/m²   General: No acute distress, awake and alert  Psychiatric: Mood and affect appear appropriate  HEENT: Trachea Midline, No torticollis, no apparent facial trauma  Cardiovascular: No audible murmurs; Extremities appear perfused  Pulmonary: No audible wheezing or stridor  Skin: No open lesions; see further details (if any) below      MUSCULOSKELETAL EXAMINATION:  Left upper extremity examination  - no acute visible abnormalities present in the left upper extremity  Extremity appears to be well perfused overall  - mild tenderness palpation noted over the lateral scapular border with no other bony or soft tissue tenderness palpation noted  - full range of motion of the shoulder noted with no pain  - 5 out of 5 strength in all planes of motion with no pain  - NV intact      _____________________________________________________  STUDIES REVIEWED:  I personally reviewed the images and interpretation is as follows:  Left shoulder x-ray three views:  Healed scapular fracture in acceptable anatomic alignment    No acute osseous abnormalities present  PROCEDURES PERFORMED:  No procedures were performed at this time           Quynh Leigh PA-C - assisting    Wilmer Willis MD

## 2021-04-08 ENCOUNTER — OFFICE VISIT (OUTPATIENT)
Dept: FAMILY MEDICINE CLINIC | Facility: CLINIC | Age: 66
End: 2021-04-08
Payer: MEDICARE

## 2021-04-08 VITALS
OXYGEN SATURATION: 98 % | HEIGHT: 63 IN | BODY MASS INDEX: 32.18 KG/M2 | HEART RATE: 60 BPM | TEMPERATURE: 97 F | DIASTOLIC BLOOD PRESSURE: 70 MMHG | WEIGHT: 181.6 LBS | SYSTOLIC BLOOD PRESSURE: 124 MMHG

## 2021-04-08 DIAGNOSIS — M31.6 TEMPORAL ARTERITIS (HCC): ICD-10-CM

## 2021-04-08 DIAGNOSIS — M25.561 CHRONIC PAIN OF BOTH KNEES: ICD-10-CM

## 2021-04-08 DIAGNOSIS — F32.5 MAJOR DEPRESSIVE DISORDER WITH SINGLE EPISODE, IN FULL REMISSION (HCC): ICD-10-CM

## 2021-04-08 DIAGNOSIS — I10 BENIGN ESSENTIAL HYPERTENSION: Primary | ICD-10-CM

## 2021-04-08 DIAGNOSIS — M25.562 CHRONIC PAIN OF BOTH KNEES: ICD-10-CM

## 2021-04-08 DIAGNOSIS — G89.29 CHRONIC PAIN OF BOTH KNEES: ICD-10-CM

## 2021-04-08 PROBLEM — S22.42XA FRACTURE OF MULTIPLE RIBS OF LEFT SIDE: Status: RESOLVED | Noted: 2020-12-21 | Resolved: 2021-04-08

## 2021-04-08 PROBLEM — Z86.59 HISTORY OF DEPRESSION: Status: RESOLVED | Noted: 2020-12-21 | Resolved: 2021-04-08

## 2021-04-08 PROBLEM — Z86.79 HISTORY OF HYPERTENSION: Status: RESOLVED | Noted: 2020-12-21 | Resolved: 2021-04-08

## 2021-04-08 PROBLEM — S42.109A SCAPULA FRACTURE: Status: RESOLVED | Noted: 2020-12-21 | Resolved: 2021-04-08

## 2021-04-08 PROCEDURE — 99214 OFFICE O/P EST MOD 30 MIN: CPT | Performed by: FAMILY MEDICINE

## 2021-04-08 RX ORDER — OLMESARTAN MEDOXOMIL 40 MG/1
20 TABLET ORAL DAILY
COMMUNITY
Start: 2021-02-09 | End: 2021-04-08

## 2021-04-08 RX ORDER — PREDNISONE 1 MG/1
TABLET ORAL
Qty: 200 TABLET | Refills: 5
Start: 2021-04-08 | End: 2021-05-21 | Stop reason: SDUPTHER

## 2021-04-08 RX ORDER — MELOXICAM 7.5 MG/1
7.5 TABLET ORAL DAILY
Qty: 90 TABLET | Refills: 3 | Status: SHIPPED | OUTPATIENT
Start: 2021-04-08 | End: 2021-11-15 | Stop reason: ALTCHOICE

## 2021-04-08 RX ORDER — OLMESARTAN MEDOXOMIL 20 MG/1
TABLET ORAL
Qty: 90 TABLET | Refills: 3
Start: 2021-04-08 | End: 2022-05-05

## 2021-04-08 NOTE — PATIENT INSTRUCTIONS
Temporal arteritis under good control  Continue slow taper of prednisone  As she is on a low dose, will try meloxicam 7 5 mg once daily for her knee pain along with 2 extra-strength Tylenol 3 times a day  Blood pressure is well controlled on current regimen which is continued  Not sure that an MRI would change anything  Suggest that she run it by Dr Darrion Gutierrez  [facility] in 3 months

## 2021-04-08 NOTE — PROGRESS NOTES
Chief Complaint   Patient presents with    Follow-up        HPI   Here for follow-up of hypertension, hyponatremia, temporal arteritis, depression, and recent scapular fracture  She is all better from her fall  No further falls  No headaches  Present dose of prednisone is 4 mg  Tapering by 1 mg every month  Last sodium was 134 on January 22nd  Continues to go to the gym  Mood is okay  Having pain in both knees right greater than left  Inquires about having an MRI  It was injected with cortisone by Dr Naila Starks last week  He did aspirate some fluid  Past Medical History:   Diagnosis Date    Anxiety state 12/19/2014    Benign essential hypertension 1/14/2010    Chronic rhinitis 1/12/2012    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 hair loss 2/4/2020    Fracture of multiple ribs of left side 12/21/2020    Hyponatremia 10/2/2020    Major depressive disorder with single episode, in full remission ([facility] ) 10/10/2019    Mild persistent asthma without complication 9/4/8782    Right chronic serous otitis media 11/7/2017    Added automatically from request for surgery [id number]    Scapula fracture 12/21/2020    Temporal arteritis ([facility] ) 9/4/2020        Past Surgical History:   Procedure Laterality Date    LAPAROSCOPY      X2 for endometriosis    [place] TEMPORAL ARTERY LIGATN OR BX Left 8/14/2020    Procedure: TEMPORAL ARTERY BIOPSY;  Surgeon: Yanira Beavers MD;  Location: [address] [place] [address];  Service: General       Social History     Tobacco Use    Smoking status: Never Smoker    Smokeless tobacco: Never Used   Substance Use Topics    Alcohol use: Yes     Frequency: 4 or more times a week     Drinks per session: 1 or 2     Binge frequency: Never     Comment: socially       Social History     Social History Narrative     since 1981  Two sons  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, gardening, walking, needle work, painting, writing books          The following portions of the patient's history were reviewed and updated as appropriate: allergies, current medications, past family history, past medical history, past social history, past surgical history and problem list       Review of Systems   Constitutional: Negative for activity change and appetite change  HENT: Negative for ear pain and hearing loss  Eyes: Negative for visual disturbance  Respiratory: Negative for shortness of breath and wheezing  Cardiovascular: Negative for chest pain and leg swelling  Gastrointestinal: Negative for abdominal pain, constipation and diarrhea  Genitourinary: Negative for difficulty urinating  Musculoskeletal: Negative for arthralgias and back pain  Bothered by pain in her knees right greater than left  Skin: Negative for rash  Neurological: Positive for headaches  Psychiatric/Behavioral: Negative for dysphoric mood  The patient is not nervous/anxious  /70 (BP Location: Left arm, Patient Position: Sitting, Cuff Size: Adult)   Pulse 60   Temp (!) 97 °F (36 1 °C) (Temporal)   Ht 5' 3" (1 6 m)   Wt 82 4 kg (181 lb 9 6 oz)   SpO2 98%   BMI 32 17 kg/m²      Physical Exam    Appears well  6 lb weight loss noted in the last 2 months  Vital signs noted above  Lungs are clear  Heart regular  Mild tenderness around the right knee without notable effusion  Good range of motion  Mood is upbeat  Affect appropriate                Current Outpatient Medications:     albuterol (PROVENTIL HFA,VENTOLIN HFA) 90 mcg/act inhaler, Inhale 2 puffs every 6 (six) hours as needed for wheezing or shortness of breath, Disp: 1 Inhaler, Rfl: 5    alendronate (Fosamax) 70 mg tablet, Take 1 tablet (70 mg total) by mouth every 7 days, Disp: 12 tablet, Rfl: 3    Aluminum & Magnesium Hydroxide (MAGNESIUM-ALUMINUM PO), Take by mouth, Disp: , Rfl:     amLODIPine (NORVASC) 5 mg tablet, One tablet MWF, 1/2 tablet other days, Disp: 90 tablet, Rfl: 3   fluticasone (FLONASE) 50 mcg/act nasal spray, 1 spray into each nostril daily, Disp: 3 Bottle, Rfl: 3    metoprolol succinate (TOPROL-XL) 100 mg 24 hr tablet, TAKE 1 TABLET BY MOUTH EVERY DAY, Disp: 90 tablet, Rfl: 3    olmesartan (BENICAR) 20 mg tablet, 1/2 tablet daily or as ordered, Disp: 90 tablet, Rfl: 3    Omega-3 1000 MG CAPS, Take by mouth, Disp: , Rfl:     predniSONE 1 mg tablet, 4 mg daily and taper as directed, Disp: 200 tablet, Rfl: 5    sertraline (ZOLOFT) 50 mg tablet, TAKE 1 TABLET BY MOUTH EVERY DAY, Disp: 90 tablet, Rfl: 3    spironolactone (ALDACTONE) 50 mg tablet, Take 1 tablet (50 mg total) by mouth daily, Disp: 90 tablet, Rfl: 5     No problem-specific Assessment & Plan notes found for this encounter  Diagnoses and all orders for this visit:    Benign essential hypertension  -     olmesartan (BENICAR) 20 mg tablet; 1/2 tablet daily or as ordered    Temporal arteritis (HCC)  -     predniSONE 1 mg tablet; 4 mg daily and taper as directed    Major depressive disorder with single episode, in full remission ([facility] )    Chronic pain of both knees        Patient Instructions    Temporal arteritis under good control  Continue slow taper of prednisone  As she is on a low dose, will try meloxicam 7 5 mg once daily for her knee pain along with 2 extra-strength Tylenol 3 times a day  Blood pressure is well controlled on current regimen which is continued  Not sure that an MRI would change anything  Suggest that she run it by Dr Adri Saenz  [place] in 3 months

## 2021-05-05 ENCOUNTER — OFFICE VISIT (OUTPATIENT)
Dept: FAMILY MEDICINE CLINIC | Facility: CLINIC | Age: 66
End: 2021-05-05
Payer: MEDICARE

## 2021-05-05 VITALS
HEIGHT: 63 IN | BODY MASS INDEX: 31.89 KG/M2 | TEMPERATURE: 97.6 F | DIASTOLIC BLOOD PRESSURE: 82 MMHG | WEIGHT: 180 LBS | OXYGEN SATURATION: 99 % | SYSTOLIC BLOOD PRESSURE: 138 MMHG | HEART RATE: 62 BPM | RESPIRATION RATE: 18 BRPM

## 2021-05-05 DIAGNOSIS — M70.61 GREATER TROCHANTERIC BURSITIS OF RIGHT HIP: Primary | ICD-10-CM

## 2021-05-05 DIAGNOSIS — L03.032 CELLULITIS OF SECOND TOE OF LEFT FOOT: ICD-10-CM

## 2021-05-05 PROCEDURE — 20610 DRAIN/INJ JOINT/BURSA W/O US: CPT | Performed by: FAMILY MEDICINE

## 2021-05-05 PROCEDURE — 99214 OFFICE O/P EST MOD 30 MIN: CPT | Performed by: FAMILY MEDICINE

## 2021-05-05 RX ORDER — CEPHALEXIN 500 MG/1
500 CAPSULE ORAL EVERY 6 HOURS SCHEDULED
Qty: 40 CAPSULE | Refills: 0 | Status: SHIPPED | OUTPATIENT
Start: 2021-05-05 | End: 2021-05-15

## 2021-05-05 RX ORDER — BUPIVACAINE HYDROCHLORIDE 2.5 MG/ML
4 INJECTION, SOLUTION INFILTRATION; PERINEURAL
Status: COMPLETED | OUTPATIENT
Start: 2021-05-05 | End: 2021-05-05

## 2021-05-05 RX ORDER — TRIAMCINOLONE ACETONIDE 40 MG/ML
40 INJECTION, SUSPENSION INTRA-ARTICULAR; INTRAMUSCULAR
Status: COMPLETED | OUTPATIENT
Start: 2021-05-05 | End: 2021-05-05

## 2021-05-05 RX ADMIN — TRIAMCINOLONE ACETONIDE 40 MG: 40 INJECTION, SUSPENSION INTRA-ARTICULAR; INTRAMUSCULAR at 11:58

## 2021-05-05 RX ADMIN — BUPIVACAINE HYDROCHLORIDE 4 ML: 2.5 INJECTION, SOLUTION INFILTRATION; PERINEURAL at 11:58

## 2021-05-05 NOTE — PATIENT INSTRUCTIONS
Knee pain could be coming from the back  Observe for now on meloxicam 15 mg daily  Two extra-strength Tylenol 3 times a day  For discomfort at the greater trochanter, it was injected with cortisone with numbing medicine  She has cellulitis of the 2nd toe  Treat with cephalexin 500 mg 4 times a day for 7-10 days  Return as scheduled or needed

## 2021-05-05 NOTE — PROGRESS NOTES
Chief Complaint   Patient presents with    Knee Pain     right knee pain     Foot Pain     left toe, x 3 weeks        HPI   With pain in the right knee  Went to [employer] 1 week ago  Pain started the next day as she was trying to walk around the were leans  She was limited in what she could do  Took Tylenol and meloxicam   Just returned last night  Having some pain also in her posterior thigh  Hurts most when she walks  Having some discomfort in her left 2nd toe  Started about 3 weeks ago and progressively getting worse  Past Medical History:   Diagnosis Date    Anxiety state 12/19/2014    Benign essential hypertension 1/14/2010    Chronic rhinitis 1/12/2012    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 hair loss 2/4/2020    Fracture of multiple ribs of left side 12/21/2020    Hyponatremia 10/2/2020    Major depressive disorder with single episode, in full remission ([facility] ) 10/10/2019    Mild persistent asthma without complication 8/9/4559    Right chronic serous otitis media 11/7/2017    Added automatically from request for surgery [id number]    Scapula fracture 12/21/2020    Temporal arteritis ([facility] ) 9/4/2020        Past Surgical History:   Procedure Laterality Date    LAPAROSCOPY      X2 for endometriosis    [place] TEMPORAL ARTERY LIGATN OR BX Left 8/14/2020    Procedure: TEMPORAL ARTERY BIOPSY;  Surgeon: Sesar Schultz MD;  Location: [facility] OR;  Service: General       Social History     Tobacco Use    Smoking status: Never Smoker    Smokeless tobacco: Never Used   Substance Use Topics    Alcohol use: Yes     Frequency: 4 or more times a week     Drinks per session: 1 or 2     Binge frequency: Never     Comment: socially       Social History     Social History Narrative     since 1981  Two sons  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, gardening, walking, needle work, painting, writing books          The following portions of the patient's history were reviewed and updated as appropriate: allergies, current medications, past family history, past medical history, past social history, past surgical history and problem list       Review of Systems   Constitutional: Negative for activity change and appetite change  HENT: Negative for ear pain and hearing loss  Eyes: Negative for visual disturbance  Respiratory: Negative for shortness of breath and wheezing  Cardiovascular: Negative for chest pain and leg swelling  Gastrointestinal: Negative for abdominal pain, constipation and diarrhea  Genitourinary: Negative for difficulty urinating  Musculoskeletal: Positive for joint swelling (L knee)  Negative for arthralgias and back pain  Skin: Negative for rash  Neurological: Negative for headaches  Psychiatric/Behavioral: Negative for dysphoric mood  The patient is not nervous/anxious  /82   Pulse 62   Temp 97 6 °F (36 4 °C) (Temporal)   Resp 18   Ht 5' 3" (1 6 m)   Wt 81 6 kg (180 lb)   SpO2 99%   BMI 31 89 kg/m²      Physical Exam     Initially, walks with a limp  Exam of the right knee shows no joint line tenderness  No effusion present  Good range of motion without discomfort  No palpable low back tenderness  Straight leg raising is negative bilaterally but does reproduce some pain in the posterior thigh and on the medial aspect of the right knee  There is tenderness over the right greater trochanter  Exam of the left 2nd toe reveals apparent ecchymosis below the toenail with erythema and tenderness just proximal to the nail  As noted below          Large joint arthrocentesis: L greater trochanteric bursa  Universal Protocol:  Patient understanding: patient states understanding of the procedure being performed    Procedure Details  Location: hip - L greater trochanteric bursa  Needle size: 25 G  Medications administered: 4 mL bupivacaine 0 25 %; 40 mg triamcinolone acetonide 40 mg/mL       After alcohol prep and  Topical anesthetic, the right greater trochanter was injected with 1 cc of 40 mg Kenalog and 4 cc of 0 25% Marcaine  Current Outpatient Medications:     albuterol (PROVENTIL HFA,VENTOLIN HFA) 90 mcg/act inhaler, Inhale 2 puffs every 6 (six) hours as needed for wheezing or shortness of breath, Disp: 1 Inhaler, Rfl: 5    alendronate (Fosamax) 70 mg tablet, Take 1 tablet (70 mg total) by mouth every 7 days, Disp: 12 tablet, Rfl: 3    Aluminum & Magnesium Hydroxide (MAGNESIUM-ALUMINUM PO), Take by mouth, Disp: , Rfl:     amLODIPine (NORVASC) 5 mg tablet, One tablet MWF, 1/2 tablet other days, Disp: 90 tablet, Rfl: 3    fluticasone (FLONASE) 50 mcg/act nasal spray, 1 spray into each nostril daily, Disp: 3 Bottle, Rfl: 3    meloxicam (MOBIC) 7 5 mg tablet, Take 1 tablet (7 5 mg total) by mouth daily, Disp: 90 tablet, Rfl: 3    metoprolol succinate (TOPROL-XL) 100 mg 24 hr tablet, TAKE 1 TABLET BY MOUTH EVERY DAY, Disp: 90 tablet, Rfl: 3    olmesartan (BENICAR) 20 mg tablet, 1/2 tablet daily or as ordered, Disp: 90 tablet, Rfl: 3    Omega-3 1000 MG CAPS, Take by mouth, Disp: , Rfl:     predniSONE 1 mg tablet, 4 mg daily and taper as directed, Disp: 200 tablet, Rfl: 5    sertraline (ZOLOFT) 50 mg tablet, TAKE 1 TABLET BY MOUTH EVERY DAY, Disp: 90 tablet, Rfl: 3    spironolactone (ALDACTONE) 50 mg tablet, Take 1 tablet (50 mg total) by mouth daily, Disp: 90 tablet, Rfl: 5    cephalexin (KEFLEX) 500 mg capsule, Take 1 capsule (500 mg total) by mouth every 6 (six) hours for 10 days, Disp: 40 capsule, Rfl: 0     No problem-specific Assessment & Plan notes found for this encounter  Diagnoses and all orders for this visit:    Greater trochanteric bursitis of right hip    Cellulitis of second toe of left foot  -     cephalexin (KEFLEX) 500 mg capsule;  Take 1 capsule (500 mg total) by mouth every 6 (six) hours for 10 days    Other orders  -     Large joint arthrocentesis        Patient Instructions     Knee pain could be coming from the back  Observe for now on meloxicam 15 mg daily  Two extra-strength Tylenol 3 times a day  For discomfort at the greater trochanter, it was injected with cortisone with numbing medicine  She has cellulitis of the 2nd toe  Treat with cephalexin 500 mg 4 times a day for 7-10 days  Return as scheduled or needed

## 2021-05-07 DIAGNOSIS — I10 BENIGN ESSENTIAL HYPERTENSION: ICD-10-CM

## 2021-05-07 RX ORDER — SPIRONOLACTONE 50 MG/1
TABLET, FILM COATED ORAL
Qty: 90 TABLET | Refills: 5 | Status: SHIPPED | OUTPATIENT
Start: 2021-05-07 | End: 2022-05-31

## 2021-05-17 DIAGNOSIS — Z79.52 CURRENT CHRONIC USE OF SYSTEMIC STEROIDS: ICD-10-CM

## 2021-05-17 RX ORDER — ALENDRONATE SODIUM 70 MG/1
TABLET ORAL
Qty: 12 TABLET | Refills: 3 | Status: SHIPPED | OUTPATIENT
Start: 2021-05-17 | End: 2022-05-05

## 2021-05-21 DIAGNOSIS — M31.6 TEMPORAL ARTERITIS (HCC): ICD-10-CM

## 2021-05-24 RX ORDER — PREDNISONE 1 MG/1
TABLET ORAL
Qty: 200 TABLET | Refills: 2
Start: 2021-05-24 | End: 2021-08-19 | Stop reason: ALTCHOICE

## 2021-06-21 NOTE — LETTER
June 21, 2021    [address] [place] [address]      Dear Ms  Ron: We are contacting you due to your past due colon cancer screening  After reviewing your chart we have noticed a referral for colonoscopy has been ordered in the past  Please call the office to have an updated order placed for colonoscopy referral or if you would like to discuss other colon cancer screening options such as the Fit Kit and Cologuard at home testing  If you have any questions or concerns, please don't hesitate to call      Sincerely,      [facility]

## 2021-06-25 ENCOUNTER — APPOINTMENT (OUTPATIENT)
Dept: RADIOLOGY | Age: 66
End: 2021-06-25
Payer: MEDICARE

## 2021-06-25 ENCOUNTER — OFFICE VISIT (OUTPATIENT)
Dept: URGENT CARE | Age: 66
End: 2021-06-25
Payer: MEDICARE

## 2021-06-25 VITALS
DIASTOLIC BLOOD PRESSURE: 85 MMHG | BODY MASS INDEX: 31.54 KG/M2 | SYSTOLIC BLOOD PRESSURE: 173 MMHG | HEART RATE: 70 BPM | TEMPERATURE: 97.4 F | HEIGHT: 63 IN | WEIGHT: 178 LBS | OXYGEN SATURATION: 100 % | RESPIRATION RATE: 17 BRPM

## 2021-06-25 DIAGNOSIS — M79.642 HAND PAIN, LEFT: ICD-10-CM

## 2021-06-25 DIAGNOSIS — M79.642 HAND PAIN, LEFT: Primary | ICD-10-CM

## 2021-06-25 PROCEDURE — 99213 OFFICE O/P EST LOW 20 MIN: CPT | Performed by: PHYSICIAN ASSISTANT

## 2021-06-25 PROCEDURE — 73130 X-RAY EXAM OF HAND: CPT

## 2021-06-25 PROCEDURE — G0463 HOSPITAL OUTPT CLINIC VISIT: HCPCS | Performed by: PHYSICIAN ASSISTANT

## 2021-06-25 NOTE — PATIENT INSTRUCTIONS
Rest, Ice, and Elevate limb  Continue to monitor symptoms  If symptoms do not improve in one week, follow up with orthopedics  Call Frida Bundy [phone] to schedule and appointment  If new or worsening symptoms occur, go immediately to the ER  Hand Sprain, Ambulatory Care   GENERAL INFORMATION:   A hand sprain  is when a ligament in your hand is stretched or torn  Ligaments are the strong tissues that connect bones  A hand sprain is usually caused by a fall onto your outstretched arm  You may have bruising, pain, and swelling of your injured hand  Seek immediate care for the following symptoms:   · Cold or numbness below the injury, such as in your fingers    · Increased pain, even after taking pain medicine    · New or increased trouble moving and using your hand, fingers, or wrist  Treatment for a hand sprain  may include a support device, such as a brace or splint  These devices limit movement and protect your joint  Treatment may also include pain medicine  Care for a hand sprain:   · Rest  your hand for 1 to 2 days after your injury  This will help decrease the risk of more damage to your hand  Avoid activities that cause pain  Return to normal activities as directed  · Apply ice  on your hand for 15 to 20 minutes every hour or as directed  Use an ice pack, or put crushed ice in a plastic bag  Cover it with a towel  Ice helps prevent tissue damage and decreases swelling and pain  · Use an elastic bandage as directed  An elastic bandage supports your hand and decreases swelling so it can heal  The elastic bandage should be snug but not tight  · Elevate  your hand above the level of your heart as often as you can  This will help decrease swelling and pain  Prop your hand on pillows or blankets to keep it elevated comfortably  · Exercise  your hand as directed to decrease stiffness and improve strength   You may be directed to exercise once you are able to move your hand without pain   Follow up with your healthcare provider as directed:  Write down your questions so you remember to ask them during your visits  CARE AGREEMENT:   You have the right to help plan your care  Learn about your health condition and how it may be treated  Discuss treatment options with your caregivers to decide what care you want to receive  You always have the right to refuse treatment  The above information is an  only  It is not intended as medical advice for individual conditions or treatments  Talk to your doctor, nurse or pharmacist before following any medical regimen to see if it is safe and effective for you  © 2014 [address] is for End User's use only and may not be sold, redistributed or otherwise used for commercial purposes  All illustrations and images included in CareNotes® are the copyrighted property of A [employer] M [employer] Inc  or [employer]

## 2021-06-25 NOTE — PROGRESS NOTES
[facility]        NAME: Coral Velasquez is a 74 y o  female  : 1955    MRN: [id number]  DATE: 2021  TIME: 2:34 PM    Assessment and Plan   Hand pain, left [M79 [id number]]  1  Hand pain, left  XR hand 3+ vw left    Ambulatory referral to Orthopedic Surgery         Patient Instructions       Rest, Ice, and Elevate limb  Continue to monitor symptoms  If symptoms do not improve in one week, follow up with orthopedics  Call Rita Odilon [phone] to schedule and appointment  If new or worsening symptoms occur, go immediately to the ER  Chief Complaint     Chief Complaint   Patient presents with    Hand Pain     Slipped on floor landing mostly on L hand/palm  +swelling, throbbing pain  +ROM         History of Present Illness       Hand Pain   Incident onset: Last night  The incident occurred at home  Injury mechanism: Pt was pumping up a pool float with a foot pump  The pump slipped forward and she fell forward to the L landing on outstretched L hand  Pain location: L hand  The quality of the pain is described as aching  The pain does not radiate  The pain is at a severity of 9/10  The pain has been constant since the incident  Pertinent negatives include no chest pain, numbness or tingling  Nothing aggravates the symptoms  She has tried ice for the symptoms  The treatment provided no relief  Review of Systems   Review of Systems   Constitutional: Negative  Negative for chills, fatigue and fever  HENT: Negative  Eyes: Negative  Respiratory: Negative  Negative for chest tightness, shortness of breath and wheezing  Cardiovascular: Negative  Negative for chest pain and palpitations  Gastrointestinal: Negative for abdominal pain, constipation, diarrhea, nausea and vomiting  Endocrine: Negative  Genitourinary: Negative  Negative for vaginal discharge  Musculoskeletal: Negative for back pain, gait problem, neck pain and neck stiffness     Skin: Negative  Negative for pallor and rash  Allergic/Immunologic: Negative  Neurological: Negative  Negative for tingling, weakness and numbness  Hematological: Negative  Psychiatric/Behavioral: Negative            Current Medications       Current Outpatient Medications:     albuterol (PROVENTIL HFA,VENTOLIN HFA) 90 mcg/act inhaler, Inhale 2 puffs every 6 (six) hours as needed for wheezing or shortness of breath, Disp: 1 Inhaler, Rfl: 5    alendronate (FOSAMAX) 70 mg tablet, TAKE 1 TABLET BY MOUTH ONE TIME PER WEEK, Disp: 12 tablet, Rfl: 3    Aluminum & Magnesium Hydroxide (MAGNESIUM-ALUMINUM PO), Take by mouth, Disp: , Rfl:     amLODIPine (NORVASC) 5 mg tablet, One tablet MWF, 1/2 tablet other days, Disp: 90 tablet, Rfl: 3    fluticasone (FLONASE) 50 mcg/act nasal spray, 1 spray into each nostril daily, Disp: 3 Bottle, Rfl: 3    meloxicam (MOBIC) 7 5 mg tablet, Take 1 tablet (7 5 mg total) by mouth daily, Disp: 90 tablet, Rfl: 3    metoprolol succinate (TOPROL-XL) 100 mg 24 hr tablet, TAKE 1 TABLET BY MOUTH EVERY DAY, Disp: 90 tablet, Rfl: 3    olmesartan (BENICAR) 20 mg tablet, 1/2 tablet daily or as ordered, Disp: 90 tablet, Rfl: 3    Omega-3 1000 MG CAPS, Take by mouth, Disp: , Rfl:     predniSONE 1 mg tablet, 4 mg daily and taper as directed, Disp: 200 tablet, Rfl: 2    sertraline (ZOLOFT) 50 mg tablet, TAKE 1 TABLET BY MOUTH EVERY DAY, Disp: 90 tablet, Rfl: 3    spironolactone (ALDACTONE) 50 mg tablet, TAKE 1 TABLET BY MOUTH EVERY DAY, Disp: 90 tablet, Rfl: 5    Current Allergies     Allergies as of 06/25/2021    (No Known Allergies)            The following portions of the patient's history were reviewed and updated as appropriate: allergies, current medications, past family history, past medical history, past social history, past surgical history and problem list      Past Medical History:   Diagnosis Date    Anxiety state 12/19/2014    Benign essential hypertension 1/14/2010    Chronic rhinitis 1/12/2012    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 hair loss 2/4/2020    Fracture of multiple ribs of left side 12/21/2020    Hyponatremia 10/2/2020    Major depressive disorder with single episode, in full remission ([facility] ) 10/10/2019    Mild persistent asthma without complication 3/2/4485    Right chronic serous otitis media 11/7/2017    Added automatically from request for surgery [id number]    Scapula fracture 12/21/2020    Temporal arteritis ([facility] ) 9/4/2020       Past Surgical History:   Procedure Laterality Date    LAPAROSCOPY      X2 for endometriosis    [facility] TEMPORAL ARTERY LIGATN OR BX Left 8/14/2020    Procedure: TEMPORAL ARTERY BIOPSY;  Surgeon: Maribel Mayberry MD;  Location: [address] [place] [address];  Service: General       Family History   Problem Relation Age of Onset    Coronary artery disease Mother     Coronary artery disease Father     No Known Problems Maternal Grandmother     No Known Problems Maternal Grandfather     Leukemia Paternal Grandmother 71    No Known Problems Paternal Grandfather     No Known Problems Son     No Known Problems Son          Medications have been verified  Objective   BP (!) 173/85   Pulse 70   Temp (!) 97 4 °F (36 3 °C)   Resp 17   Ht 5' 3" (1 6 m)   Wt 80 7 kg (178 lb)   SpO2 100%   BMI 31 53 kg/m²        Physical Exam     Physical Exam  Vitals and nursing note reviewed  Constitutional:       General: She is not in acute distress  Appearance: Normal appearance  She is well-developed  She is not ill-appearing or diaphoretic  HENT:      Head: Normocephalic and atraumatic  Eyes:      General:         Right eye: No discharge  Left eye: No discharge  Conjunctiva/sclera: Conjunctivae normal    Cardiovascular:      Rate and Rhythm: Normal rate and regular rhythm  Heart sounds: Normal heart sounds  Pulmonary:      Effort: Pulmonary effort is normal  No respiratory distress  Breath sounds: Normal breath sounds  No wheezing or rales  Musculoskeletal:      Left wrist: Tenderness and snuff box tenderness present  No swelling, deformity, effusion, lacerations or crepitus  Normal range of motion  Normal pulse  Left hand: Tenderness (Palm of hand hear base of first digit as well as snoffbox  SOme bruising in this area as well ) present  No swelling or bony tenderness  Normal range of motion  Normal sensation  Normal capillary refill  Normal pulse  Cervical back: Normal range of motion and neck supple  Lymphadenopathy:      Cervical: No cervical adenopathy  Skin:     General: Skin is warm  Capillary Refill: Capillary refill takes less than 2 seconds  Coloration: Skin is not pale  Findings: No rash  Neurological:      Mental Status: She is alert

## 2021-06-28 ENCOUNTER — HOSPITAL ENCOUNTER (OUTPATIENT)
Dept: RADIOLOGY | Facility: HOSPITAL | Age: 66
Discharge: HOME | End: 2021-06-28
Payer: MEDICARE

## 2021-06-28 ENCOUNTER — OFFICE VISIT (OUTPATIENT)
Dept: OBGYN CLINIC | Facility: HOSPITAL | Age: 66
End: 2021-06-28
Payer: MEDICARE

## 2021-06-28 VITALS
HEART RATE: 59 BPM | SYSTOLIC BLOOD PRESSURE: 148 MMHG | WEIGHT: 178 LBS | HEIGHT: 63 IN | DIASTOLIC BLOOD PRESSURE: 82 MMHG | BODY MASS INDEX: 31.54 KG/M2

## 2021-06-28 DIAGNOSIS — M25.532 PAIN IN LEFT WRIST: Primary | ICD-10-CM

## 2021-06-28 DIAGNOSIS — M25.532 PAIN IN LEFT WRIST: ICD-10-CM

## 2021-06-28 DIAGNOSIS — S62.002A CLOSED NONDISPLACED FRACTURE OF SCAPHOID OF LEFT WRIST, UNSPECIFIED PORTION OF SCAPHOID, INITIAL ENCOUNTER: ICD-10-CM

## 2021-06-28 PROCEDURE — 99213 OFFICE O/P EST LOW 20 MIN: CPT | Performed by: PHYSICIAN ASSISTANT

## 2021-06-28 PROCEDURE — 73110 X-RAY EXAM OF WRIST: CPT

## 2021-06-28 PROCEDURE — 29075 APPL CST ELBW FNGR SHORT ARM: CPT | Performed by: PHYSICIAN ASSISTANT

## 2021-06-28 NOTE — PATIENT INSTRUCTIONS
Cast Care   WHAT YOU NEED TO KNOW:   Cast care will help the cast dry and harden correctly, and then protect it until it comes off  Your cast may need up to 48 hours to dry and harden completely  Even after your cast hardens, it can be damaged  DISCHARGE INSTRUCTIONS:   Return to the emergency department if:   · Your cast breaks or gets damaged  · You see drainage, or your cast is stained or smells bad  · Your skin turns blue or pale  · Your skin tingles, burns, or is cold or numb  · You have severe pain that is getting worse and does not go away after you take pain medicine  · Your limb swells, or your cast looks or feels tighter than it was before  Contact your healthcare provider if:   · Something falls into your cast and gets stuck  · You have itching, pain, burning, or weakness where you have the cast      · You have a fever  · You have sores, blisters, or breaks on the skin around the edges of the cast     · You have questions or concerns about your condition or care  Follow up with your healthcare provider as directed: You will need to return to have your cast removed and your bones checked  Write down your questions so you remember to ask them during your visits  Care for your cast while it hardens:   · Protect the cast   Do not put weight on the cast  Do not bend, lean on, or hit the cast with anything  Use the palms of your hands when you move the cast  Do not use your fingers  Your fingers may leave marks on the cast as it dries  · Change positions often  Change your position every 2 hours to help the cast dry faster  Prop your cast on something soft, such as a pillow, to prevent a flat area on your cast      · Keep the cast dry  Tie plastic trash bags around your cast to keep it dry while you bathe  You may use a blow dryer on cool or the lowest heat setting to dry your cast if it gets wet  Do not use a high heat setting, because you may burn your skin   Certain casts can get wet  Ask if you have a waterproof cast     Care for your cast after it hardens:   · Check your cast every day  Contact your healthcare provider if you notice any cracks, dents, holes, or flaking on your cast      · Keep your cast clean and dry  Cover your cast with a towel when you eat  You may have a small piece of cast that can be removed to check on incisions under your cast  Make sure the small piece of cast is kept tightly closed  If your cast gets dirty, use a mild detergent and a damp washcloth to wipe off the outside of your cast  Continue to cover your cast with trash bags to keep it dry while you bathe  · Care for the edges of your cast   Cover the cast edges to keep them smooth  Use 4 inch pieces of waterproof tape  Place one end of the tape under the inside edge of your cast and fold it over to the outside surface  Overlap tape strips until the edges are completely covered  Change the tape as directed  Do not pull or repair any of the padding from inside the cast  This could cause blisters and sores on the skin under your cast      · Keep weight off your cast   Do not let anyone push down or lean on your cast  This may cause it to break  · Do not use sharp objects  Do not use a sharp or pointed object to scratch under your cast  This may cause wounds that can get infected, or you may lose the item inside the cast  If your skin itches, blow cool air under the cast  You may also gently scratch your skin outside the cast with a cloth  © Copyright [address] Information is for End User's use only and may not be sold, redistributed or otherwise used for commercial purposes  All illustrations and images included in CareNotes® are the copyrighted property of [employer]  or [facility] Dianne Garcia   The above information is an  only  It is not intended as medical advice for individual conditions or treatments   Talk to your doctor, nurse or pharmacist before following any medical regimen to see if it is safe and effective for you

## 2021-06-28 NOTE — PROGRESS NOTES
Assessment/Plan   Diagnoses and all orders for this visit:    Pain in left wrist    Closed nondisplaced fracture of scaphoid of left wrist, unspecified portion of scaphoid, initial encounter    - Short arm cast with thumb spica  - Follow up with hand in 1-2 weeks        Subjective   Patient ID: Eyal Velasquez is a 74 y o  female  Vitals:    06/28/21 1551   BP: 148/82   Pulse: 61     65yo female comes in for an evaluation of her left wrist   She was injured on 6-24-21 when she slipped on her pool's foot pump and fell onto her outstretched hand  Xrays in urgent care were normal, but there was clinical concern for an occult scaphoid fracture  She was treated in a splint  Her pain completely resolved in the splint, but when she took it off for the exam today, she c/o some return of radial wrist pain  The pain is dull in character, mild in severity, pain does not radiate and is not associated with numbness          The following portions of the patient's history were reviewed and updated as appropriate: allergies, current medications, past family history, past medical history, past social history, past surgical history and problem list     Review of Systems  Ortho Exam  Past Medical History:   Diagnosis Date    Anxiety state 12/19/2014    Benign essential hypertension 1/14/2010    Chronic rhinitis 1/12/2012    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 hair loss 2/4/2020    Fracture of multiple ribs of left side 12/21/2020    Hyponatremia 10/2/2020    Major depressive disorder with single episode, in full remission ([address] ) 10/10/2019    Mild persistent asthma without complication 8/1/4320    Right chronic serous otitis media 11/7/2017    Added automatically from request for surgery [id number]    Scapula fracture 12/21/2020    Temporal arteritis ([address] ) 9/4/2020     Past Surgical History:   Procedure Laterality Date    LAPAROSCOPY      X2 for endometriosis    [place] TEMPORAL ARTERY LIGATN OR BX Left 8/14/2020    Procedure: TEMPORAL ARTERY BIOPSY;  Surgeon: Leesa Castañeda MD;  Location: [address] [place] [address] OR;  Service: General     Family History   Problem Relation Age of Onset    Coronary artery disease Mother     Coronary artery disease Father     No Known Problems Maternal Grandmother     No Known Problems Maternal Grandfather     Leukemia Paternal Grandmother 71    No Known Problems Paternal Grandfather     No Known Problems Son     No Known Problems Son      Social History     Occupational History    Not on file   Tobacco Use    Smoking status: Never Smoker    Smokeless tobacco: Never Used   Vaping Use    Vaping Use: Never used   Substance and Sexual Activity    Alcohol use: Yes     Comment: socially    Drug use: Never    Sexual activity: Not Currently       Review of Systems   Constitutional: Negative  HENT: Negative  Eyes: Negative  Respiratory: Negative  Cardiovascular: Negative  Gastrointestinal: Negative  Endocrine: Negative  Genitourinary: Negative  Musculoskeletal: As below      Allergic/Immunologic: Negative  Neurological: Negative  Hematological: Negative  Psychiatric/Behavioral: Negative  Objective   Physical Exam    · Constitutional: Awake, Alert, Oriented  · Eyes: EOMI  · Psych: Mood and affect appropriate  · Heart: regular rate and rhythm  · Lungs: No audible wheezing  · Abdomen: soft  · Lymph: no lymphedema   left wrist:  - Appearance   Swelling: mild and ecchymosis: of the thenar eminence  - Palpation  o + tenderness snuffbox and thenar eminence  - ROM  o not examined due to fracture status  - Motor  o Not tested due to fracture status  - Special Tests  o normal sensation of hand and arm  - NVI distally    I have personally reviewed pertinent films in PACS and my interpretation is non-displaced scaphoid fracture       Cast application    Date/Time: 6/28/2021 4:24 PM  Performed by: Briana Snowden PA-C  Authorized by: Briana Snowden PA-C   Universal Protocol:  Consent: Verbal consent obtained  Risks and benefits: risks, benefits and alternatives were discussed  Consent given by: patient  Timeout called at: 6/28/2021 4:24 PM   Patient understanding: patient states understanding of the procedure being performed  Radiology Images displayed and confirmed  If images not available, report reviewed: imaging studies available  Patient identity confirmed: verbally with patient      Pre-procedure details:     Sensation:  Normal  Procedure details:     Laterality:  Left    Location:  Wrist    Wrist:  L wristCast type:  Short arm (with thumb spica)      Supplies:  Cotton padding and fiberglass  Post-procedure details:     Pain:  Improved    Sensation:  Normal    Patient tolerance of procedure:   Tolerated well, no immediate complications

## 2021-07-08 ENCOUNTER — OFFICE VISIT (OUTPATIENT)
Dept: FAMILY MEDICINE CLINIC | Facility: CLINIC | Age: 66
End: 2021-07-08
Payer: MEDICARE

## 2021-07-08 ENCOUNTER — LAB (OUTPATIENT)
Dept: LAB | Facility: CLINIC | Age: 66
End: 2021-07-08
Payer: MEDICARE

## 2021-07-08 VITALS
DIASTOLIC BLOOD PRESSURE: 80 MMHG | WEIGHT: 178 LBS | OXYGEN SATURATION: 96 % | HEART RATE: 58 BPM | SYSTOLIC BLOOD PRESSURE: 122 MMHG | BODY MASS INDEX: 31.54 KG/M2 | TEMPERATURE: 96.6 F | HEIGHT: 63 IN

## 2021-07-08 DIAGNOSIS — Z79.52 CURRENT CHRONIC USE OF SYSTEMIC STEROIDS: ICD-10-CM

## 2021-07-08 DIAGNOSIS — I10 BENIGN ESSENTIAL HYPERTENSION: Primary | ICD-10-CM

## 2021-07-08 DIAGNOSIS — M17.11 PRIMARY OSTEOARTHRITIS OF RIGHT KNEE: ICD-10-CM

## 2021-07-08 DIAGNOSIS — F32.5 MAJOR DEPRESSIVE DISORDER WITH SINGLE EPISODE, IN FULL REMISSION (HCC): ICD-10-CM

## 2021-07-08 DIAGNOSIS — E87.1 HYPONATREMIA: ICD-10-CM

## 2021-07-08 DIAGNOSIS — M31.6 TEMPORAL ARTERITIS (HCC): ICD-10-CM

## 2021-07-08 DIAGNOSIS — I10 BENIGN ESSENTIAL HYPERTENSION: ICD-10-CM

## 2021-07-08 DIAGNOSIS — Z12.31 SCREENING MAMMOGRAM, ENCOUNTER FOR: ICD-10-CM

## 2021-07-08 DIAGNOSIS — F10.10 ALCOHOL USE DISORDER, MILD, ABUSE: ICD-10-CM

## 2021-07-08 PROBLEM — G89.11 ACUTE PAIN DUE TO TRAUMA: Status: RESOLVED | Noted: 2020-12-21 | Resolved: 2021-07-08
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
## 2021-07-08 PROBLEM — J45.20 MILD INTERMITTENT ASTHMA WITHOUT COMPLICATION: Status: ACTIVE | Noted: 2017-09-07

## 2021-07-08 LAB
ALBUMIN SERPL BCP-MCNC: 4 G/DL (ref 3.5–5)
ALP SERPL-CCNC: 54 U/L (ref 46–116)
ALT SERPL W P-5'-P-CCNC: 25 U/L (ref 12–78)
ANION GAP SERPL CALCULATED.3IONS-SCNC: 6 MMOL/L (ref 4–13)
AST SERPL W P-5'-P-CCNC: 14 U/L (ref 5–45)
BILIRUB SERPL-MCNC: 0.55 MG/DL (ref 0.2–1)
BUN SERPL-MCNC: 13 MG/DL (ref 5–25)
CALCIUM SERPL-MCNC: 9.8 MG/DL (ref 8.3–10.1)
CHLORIDE SERPL-SCNC: 95 MMOL/L (ref 100–108)
CO2 SERPL-SCNC: 27 MMOL/L (ref 21–32)
CREAT SERPL-MCNC: 0.68 MG/DL (ref 0.6–1.3)
GFR SERPL CREATININE-BSD FRML MDRD: 90 ML/MIN/1.73SQ M
GLUCOSE P FAST SERPL-MCNC: 81 MG/DL (ref 65–99)
POTASSIUM SERPL-SCNC: 4.5 MMOL/L (ref 3.5–5.3)
PROT SERPL-MCNC: 8 G/DL (ref 6.4–8.2)
SODIUM SERPL-SCNC: 128 MMOL/L (ref 136–145)

## 2021-07-08 PROCEDURE — 86618 LYME DISEASE ANTIBODY: CPT

## 2021-07-08 PROCEDURE — 80053 COMPREHEN METABOLIC PANEL: CPT

## 2021-07-08 PROCEDURE — 99214 OFFICE O/P EST MOD 30 MIN: CPT | Performed by: FAMILY MEDICINE

## 2021-07-08 PROCEDURE — 36415 COLL VENOUS BLD VENIPUNCTURE: CPT

## 2021-07-08 NOTE — PROGRESS NOTES
Chief Complaint   Patient presents with    Follow-up        HPI   Here for follow-up of hypertension, hyponatremia, temporal arteritis, and depression  Wearing a short arms left wrist cast from a navicular fracture after she tripped on a pump in her garage  Also recently had Synvisc injections in her right knee from Dr Miguel Huff  Current dose of prednisone is 1 mg daily  Has been on that for the last 2 weeks  No headaches  Mood is good  Has 2 drinks a day although  is concerned about it  Past Medical History:   Diagnosis Date    Anxiety state 12/19/2014    Benign essential hypertension 1/14/2010    Chronic rhinitis 1/12/2012    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 hair loss 2/4/2020    Fracture of multiple ribs of left side 12/21/2020    Hyponatremia 10/2/2020    Major depressive disorder with single episode, in full remission ([facility] ) 10/10/2019    Mild persistent asthma without complication 5/2/0185    Right chronic serous otitis media 11/7/2017    Added automatically from request for surgery [id number]    Scapula fracture 12/21/2020    Temporal arteritis ([facility] ) 9/4/2020        Past Surgical History:   Procedure Laterality Date    LAPAROSCOPY      X2 for endometriosis    [place] TEMPORAL ARTERY LIGATN OR BX Left 8/14/2020    Procedure: TEMPORAL ARTERY BIOPSY;  Surgeon: Mariela Mccarty MD;  Location: [address] [place] [address];  Service: General       Social History     Tobacco Use    Smoking status: Never Smoker    Smokeless tobacco: Never Used   Substance Use Topics    Alcohol use: Yes     Comment: socially       Social History     Social History Narrative     since 1981  Two sons  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, gardening, walking, needle work, painting, writing books          The following portions of the patient's history were reviewed and updated as appropriate: allergies, current medications, past family history, past medical history, past social history, past surgical history and problem list       Review of Systems   Constitutional: Negative for activity change and appetite change  HENT: Negative for ear pain and hearing loss  Eyes: Negative for visual disturbance  Respiratory: Negative for shortness of breath and wheezing  Cardiovascular: Negative for chest pain and leg swelling  Gastrointestinal: Negative for abdominal pain, constipation and diarrhea  Genitourinary: Negative for difficulty urinating  Musculoskeletal: Negative for arthralgias and back pain  Skin: Negative for rash  Neurological: Negative for headaches  Psychiatric/Behavioral: Negative for dysphoric mood  The patient is not nervous/anxious  /80 (BP Location: Right arm, Patient Position: Sitting, Cuff Size: Adult)   Pulse 58   Temp (!) 96 6 °F (35 9 °C) (Temporal)   Ht 5' 3" (1 6 m)   Wt 80 7 kg (178 lb)   SpO2 96%   BMI 31 53 kg/m²      Physical Exam   Repeat blood pressure 150/90  Appears well  Lungs are clear  Heart regular    Cast on left wrist               Current Outpatient Medications:     alendronate (FOSAMAX) 70 mg tablet, TAKE 1 TABLET BY MOUTH ONE TIME PER WEEK, Disp: 12 tablet, Rfl: 3    Aluminum & Magnesium Hydroxide (MAGNESIUM-ALUMINUM PO), Take by mouth, Disp: , Rfl:     amLODIPine (NORVASC) 5 mg tablet, One tablet MWF, 1/2 tablet other days, Disp: 90 tablet, Rfl: 3    fluticasone (FLONASE) 50 mcg/act nasal spray, 1 spray into each nostril daily, Disp: 3 Bottle, Rfl: 3    metoprolol succinate (TOPROL-XL) 100 mg 24 hr tablet, TAKE 1 TABLET BY MOUTH EVERY DAY, Disp: 90 tablet, Rfl: 3    olmesartan (BENICAR) 20 mg tablet, 1/2 tablet daily or as ordered, Disp: 90 tablet, Rfl: 3    Omega-3 1000 MG CAPS, Take by mouth, Disp: , Rfl:     predniSONE 1 mg tablet, 4 mg daily and taper as directed, Disp: 200 tablet, Rfl: 2    sertraline (ZOLOFT) 50 mg tablet, TAKE 1 TABLET BY MOUTH EVERY DAY, Disp: 90 tablet, Rfl: 3   spironolactone (ALDACTONE) 50 mg tablet, TAKE 1 TABLET BY MOUTH EVERY DAY, Disp: 90 tablet, Rfl: 5    albuterol (PROVENTIL HFA,VENTOLIN HFA) 90 mcg/act inhaler, Inhale 2 puffs every 6 (six) hours as needed for wheezing or shortness of breath (Patient not taking: Reported on 7/8/2021), Disp: 1 Inhaler, Rfl: 5    meloxicam (MOBIC) 7 5 mg tablet, Take 1 tablet (7 5 mg total) by mouth daily, Disp: 90 tablet, Rfl: 3     No problem-specific Assessment & Plan notes found for this encounter  Diagnoses and all orders for this visit:    Benign essential hypertension  -     Comprehensive metabolic panel; Future    Temporal arteritis (HCC)    Major depressive disorder with single episode, in full remission ([facility] )    Current chronic use of systemic steroids    Primary osteoarthritis of right knee  -     Lyme Total Antibody Profile with reflex to WB; Future    Hyponatremia    Screening mammogram, encounter for  -     Mammo screening bilateral w 3d & cad; Future    Alcohol use disorder, mild, abuse    Other orders  -     Cancel: Ambulatory referral to Gastroenterology; Future        Patient Instructions   Blood pressure appears to be okay  Same medication  Recheck sodium along with liver function test   Some discussion about her use of alcohol which she feels is under control, usually about 2 drinks a day  Continue prednisone 1 mg daily for 2 more weeks and then stop  Will continue Fosamax once weekly since it is well tolerated and she does have osteopenia  Recheck bone density in about a year  Instead of colonoscopy, she agrees to the Cologuard which she has at home  Mood is well controlled with sertraline  Will check Lyme titer because of her arthritis which seems to be isolated to her right knee  Meloxicam can be up to 15 mg daily along with 2 extra-strength Tylenol 3 times a day  Recheck in 4 months

## 2021-07-08 NOTE — PATIENT INSTRUCTIONS
Blood pressure appears to be okay  Same medication  Recheck sodium along with liver function test   Some discussion about her use of alcohol which she feels is under control, usually about 2 drinks a day  Continue prednisone 1 mg daily for 2 more weeks and then stop  Will continue Fosamax once weekly since it is well tolerated and she does have osteopenia  Recheck bone density in about a year  Instead of colonoscopy, she agrees to the Cologuard which she has at home  Mood is well controlled with sertraline  Will check Lyme titer because of her arthritis which seems to be isolated to her right knee  Meloxicam can be up to 15 mg daily along with 2 extra-strength Tylenol 3 times a day  Recheck in 4 months

## 2021-07-09 ENCOUNTER — HOSPITAL ENCOUNTER (OUTPATIENT)
Dept: RADIOLOGY | Facility: IMAGING CENTER | Age: 66
Discharge: HOME | End: 2021-07-09
Payer: MEDICARE

## 2021-07-09 VITALS — BODY MASS INDEX: 30.39 KG/M2 | WEIGHT: 178 LBS | HEIGHT: 64 IN

## 2021-07-09 DIAGNOSIS — Z12.31 SCREENING MAMMOGRAM, ENCOUNTER FOR: ICD-10-CM

## 2021-07-09 LAB — B BURGDOR IGG+IGM SER-ACNC: 15

## 2021-07-09 PROCEDURE — 77067 SCR MAMMO BI INCL CAD: CPT

## 2021-07-09 PROCEDURE — 77063 BREAST TOMOSYNTHESIS BI: CPT

## 2021-07-13 ENCOUNTER — OFFICE VISIT (OUTPATIENT)
Dept: OBGYN CLINIC | Facility: MEDICAL CENTER | Age: 66
End: 2021-07-13
Payer: MEDICARE

## 2021-07-13 ENCOUNTER — APPOINTMENT (OUTPATIENT)
Dept: RADIOLOGY | Facility: MEDICAL CENTER | Age: 66
End: 2021-07-13
Payer: MEDICARE

## 2021-07-13 VITALS
WEIGHT: 171 LBS | DIASTOLIC BLOOD PRESSURE: 80 MMHG | SYSTOLIC BLOOD PRESSURE: 157 MMHG | BODY MASS INDEX: 29.19 KG/M2 | RESPIRATION RATE: 16 BRPM | HEART RATE: 56 BPM | HEIGHT: 64 IN

## 2021-07-13 DIAGNOSIS — S62.002A CLOSED NONDISPLACED FRACTURE OF SCAPHOID OF LEFT WRIST, UNSPECIFIED PORTION OF SCAPHOID, INITIAL ENCOUNTER: ICD-10-CM

## 2021-07-13 DIAGNOSIS — S62.002A CLOSED NONDISPLACED FRACTURE OF SCAPHOID OF LEFT WRIST, UNSPECIFIED PORTION OF SCAPHOID, INITIAL ENCOUNTER: Primary | ICD-10-CM

## 2021-07-13 PROCEDURE — 73110 X-RAY EXAM OF WRIST: CPT

## 2021-07-13 PROCEDURE — 99214 OFFICE O/P EST MOD 30 MIN: CPT | Performed by: ORTHOPAEDIC SURGERY

## 2021-07-13 NOTE — PROGRESS NOTES
Chief Complaint     Left wrist fracture      History of Present Illness     Steve Velasquez is a 74 y o  female who presents today for evaluation of her left wrist scaphoid fracture  Patient was injured on 6/24/21 when she slipped on her pool's foot pump and fell on an outstretched hand  Patient went to urgent care where they were concerned for an occult scaphoid fracture  She then followed up with our acute care PA on 06/28/21 where xrays were repeated which did indeed show a nondisplaced scaphoid fracture  She was placed in thumb spica cast at that time  States she has never injured this wrist before  States the cast is fitting well and now that she isn't moving, she doesn't have pain  No pain in the elbow  No numbness and tingling    Not taking narcotic pain medicine        Past Medical History:   Diagnosis Date    Anxiety state 12/19/2014    Benign essential hypertension 1/14/2010    Chronic rhinitis 1/12/2012    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 hair loss 2/4/2020    Fracture of multiple ribs of left side 12/21/2020    Hyponatremia 10/2/2020    Major depressive disorder with single episode, in full remission ([address] ) 10/10/2019    Mild persistent asthma without complication 8/9/2729    Right chronic serous otitis media 11/7/2017    Added automatically from request for surgery [id number]    Scapula fracture 12/21/2020    Temporal arteritis ([address] ) 9/4/2020       Past Surgical History:   Procedure Laterality Date    LAPAROSCOPY      X2 for endometriosis    VT TEMPORAL ARTERY LIGATN OR BX Left 8/14/2020    Procedure: TEMPORAL ARTERY BIOPSY;  Surgeon: Yogi Saha MD;  Location: [facility] MAIN OR;  Service: General       No Known Allergies    Current Outpatient Medications on File Prior to Visit   Medication Sig Dispense Refill    alendronate (FOSAMAX) 70 mg tablet TAKE 1 TABLET BY MOUTH ONE TIME PER WEEK 12 tablet 3    Aluminum & Magnesium Hydroxide (MAGNESIUM-ALUMINUM PO) Take by mouth      amLODIPine (NORVASC) 5 mg tablet One tablet MWF, 1/2 tablet other days 90 tablet 3    fluticasone (FLONASE) 50 mcg/act nasal spray 1 spray into each nostril daily 3 Bottle 3    meloxicam (MOBIC) 7 5 mg tablet Take 1 tablet (7 5 mg total) by mouth daily 90 tablet 3    metoprolol succinate (TOPROL-XL) 100 mg 24 hr tablet TAKE 1 TABLET BY MOUTH EVERY DAY 90 tablet 3    olmesartan (BENICAR) 20 mg tablet 1/2 tablet daily or as ordered 90 tablet 3    Omega-3 1000 MG CAPS Take by mouth      predniSONE 1 mg tablet 4 mg daily and taper as directed 200 tablet 2    sertraline (ZOLOFT) 50 mg tablet TAKE 1 TABLET BY MOUTH EVERY DAY 90 tablet 3    spironolactone (ALDACTONE) 50 mg tablet TAKE 1 TABLET BY MOUTH EVERY DAY 90 tablet 5    albuterol (PROVENTIL HFA,VENTOLIN HFA) 90 mcg/act inhaler Inhale 2 puffs every 6 (six) hours as needed for wheezing or shortness of breath (Patient not taking: Reported on 7/8/2021) 1 Inhaler 5     No current facility-administered medications on file prior to visit  Social History     Tobacco Use    Smoking status: Never Smoker    Smokeless tobacco: Never Used   Vaping Use    Vaping Use: Never used   Substance Use Topics    Alcohol use: Yes     Comment: socially    Drug use: Never       Family History   Problem Relation Age of Onset    Coronary artery disease Mother     Coronary artery disease Father     No Known Problems Maternal Grandmother     No Known Problems Maternal Grandfather     Leukemia Paternal Grandmother 71    No Known Problems Paternal Grandfather     No Known Problems Son     No Known Problems Son        Review of Systems     As stated in the HPI  All other systems were reviewed and are negative  Physical Exam     /80   Pulse 56   Resp 16   Ht 5' 3 5" (1 613 m)   Wt 77 6 kg (171 lb)   BMI 29 82 kg/m²     GENERAL: This is a well-developed, well-nourished, age-appropriate patient in no acute distress  The patient is alert and oriented x3  Pleasant and cooperative    Eyes: Anicteric sclerae  Extraocular movements appear intact  HENT: Nares are patent with no drainage  Lungs: There is equal chest rise on inspection  Breathing is non-labored with no audible wheezing  Cardiovascular: No cyanosis  No upper extremity lymphadema  Skin: Skin is warm to touch  No obvious skin lesions or rashes other than described below  Neurologic: No ataxia  Psychiatric: Mood and affect are appropriate  Left Upper Extremity:  Patient in a well-fitted thumb spica cast    Nontender to elbow  Patient able to make a full fist with her other fingers in regards to what the cast will allow  Sensation intact to light touch throughout the exposed fingers  Brisk capillary refill  Data Review     Labs:  None    Electrodiagnostic Testing:  None    Imaging:  Xrays taken today of the left wrist were personally reviewed by me and demonstrate maintained alignment compared to previous xrays of scaphoid fracture with no displacement seen  Assessment and Plan      Diagnoses and all orders for this visit:    Closed nondisplaced fracture of scaphoid of left wrist, unspecified portion of scaphoid, initial encounter  -     XR wrist 3+ vw left; Future         74year old female with left wrist scaphoid fracture from 06/24/21  I discussed patient's xrays with her today  I explained the anatomy of scaphoid fractures with her today and explained that because this is such an important bone of the wrist and because it can take more time to heal, we like to err on the side of caution and have her casted for a total of 6 weeks  Operative treatment would allow her to remove the cast earlier but comes with the risk of complications associated with operative treatment and that nonoperative rates of healing are upwards of 95%  Patient agrees to proceed with continued nonoperative care    Patient instructed to call if any questions/concerns with the cast  Patient encouraged to work on finger and elbow range of motion in the meantime and elevate as needed for swelling         Follow Up: 4 weeks    To Do Next Visit: Cast off, repeat left wrist xrays with scaphoid view    PROCEDURES PERFORMED:  Procedures  No Procedures performed today

## 2021-08-09 DIAGNOSIS — I10 BENIGN ESSENTIAL HYPERTENSION: ICD-10-CM

## 2021-08-09 RX ORDER — AMLODIPINE BESYLATE 5 MG/1
TABLET ORAL
Qty: 64 TABLET | Refills: 5 | Status: SHIPPED | OUTPATIENT
Start: 2021-08-09

## 2021-08-10 ENCOUNTER — APPOINTMENT (OUTPATIENT)
Dept: RADIOLOGY | Facility: MEDICAL CENTER | Age: 66
End: 2021-08-10
Payer: MEDICARE

## 2021-08-10 ENCOUNTER — OFFICE VISIT (OUTPATIENT)
Dept: OBGYN CLINIC | Facility: MEDICAL CENTER | Age: 66
End: 2021-08-10
Payer: MEDICARE

## 2021-08-10 VITALS
BODY MASS INDEX: 29.19 KG/M2 | HEIGHT: 64 IN | DIASTOLIC BLOOD PRESSURE: 79 MMHG | WEIGHT: 171 LBS | SYSTOLIC BLOOD PRESSURE: 133 MMHG | HEART RATE: 62 BPM

## 2021-08-10 DIAGNOSIS — M18.12 ARTHRITIS OF CARPOMETACARPAL (CMC) JOINT OF LEFT THUMB: ICD-10-CM

## 2021-08-10 DIAGNOSIS — S62.002D CLOSED NONDISPLACED FRACTURE OF SCAPHOID OF LEFT WRIST WITH ROUTINE HEALING, UNSPECIFIED PORTION OF SCAPHOID, SUBSEQUENT ENCOUNTER: ICD-10-CM

## 2021-08-10 DIAGNOSIS — S62.002D CLOSED NONDISPLACED FRACTURE OF SCAPHOID OF LEFT WRIST WITH ROUTINE HEALING, UNSPECIFIED PORTION OF SCAPHOID, SUBSEQUENT ENCOUNTER: Primary | ICD-10-CM

## 2021-08-10 PROCEDURE — 73110 X-RAY EXAM OF WRIST: CPT

## 2021-08-10 PROCEDURE — 99213 OFFICE O/P EST LOW 20 MIN: CPT | Performed by: ORTHOPAEDIC SURGERY

## 2021-08-10 NOTE — PROGRESS NOTES
Chief Complaint     Left wrist fracture       History of Present Illness     Robin Velasquez is a 74 y o   female who presents to the office for a follow up regarding a left wrist scaphoid fracture, DOI 6/24/21  Overall Paulo Melgar is doing well  She has been immobilized in a thumb spica cast  She has tolerated casting well  Paulo Melgar notes mild wrist/thumb pain  Past Medical History:   Diagnosis Date    Anxiety state 12/19/2014    Benign essential hypertension 1/14/2010    Chronic rhinitis 1/12/2012    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 hair loss 2/4/2020    Fracture of multiple ribs of left side 12/21/2020    Hyponatremia 10/2/2020    Major depressive disorder with single episode, in full remission ([address] ) 10/10/2019    Mild persistent asthma without complication 7/7/7665    Right chronic serous otitis media 11/7/2017    Added automatically from request for surgery [id number]    Scapula fracture 12/21/2020    Temporal arteritis ([address] ) 9/4/2020       Past Surgical History:   Procedure Laterality Date    LAPAROSCOPY      X2 for endometriosis    NM TEMPORAL ARTERY LIGATN OR BX Left 8/14/2020    Procedure: TEMPORAL ARTERY BIOPSY;  Surgeon: Bailee Mariee MD;  Location: [facility] MAIN OR;  Service: General       No Known Allergies    Current Outpatient Medications on File Prior to Visit   Medication Sig Dispense Refill    albuterol (PROVENTIL HFA,VENTOLIN HFA) 90 mcg/act inhaler Inhale 2 puffs every 6 (six) hours as needed for wheezing or shortness of breath 1 Inhaler 5    alendronate (FOSAMAX) 70 mg tablet TAKE 1 TABLET BY MOUTH ONE TIME PER WEEK 12 tablet 3    Aluminum & Magnesium Hydroxide (MAGNESIUM-ALUMINUM PO) Take by mouth      amLODIPine (NORVASC) 5 mg tablet TAKE 1 TABLET BY MOUTH DAILY ON MONDAYS, WEDNESDAYS, AND FRIDAYS AND 1/2 TABLET ALL OTHER DAYS   64 tablet 5    fluticasone (FLONASE) 50 mcg/act nasal spray 1 spray into each nostril daily 3 Bottle 3    meloxicam (MOBIC) 7 5 mg tablet Take 1 tablet (7 5 mg total) by mouth daily 90 tablet 3    metoprolol succinate (TOPROL-XL) 100 mg 24 hr tablet TAKE 1 TABLET BY MOUTH EVERY DAY 90 tablet 3    olmesartan (BENICAR) 20 mg tablet 1/2 tablet daily or as ordered 90 tablet 3    Omega-3 1000 MG CAPS Take by mouth      sertraline (ZOLOFT) 50 mg tablet TAKE 1 TABLET BY MOUTH EVERY DAY 90 tablet 3    spironolactone (ALDACTONE) 50 mg tablet TAKE 1 TABLET BY MOUTH EVERY DAY 90 tablet 5    predniSONE 1 mg tablet 4 mg daily and taper as directed (Patient not taking: Reported on 8/10/2021) 200 tablet 2     No current facility-administered medications on file prior to visit  Social History     Tobacco Use    Smoking status: Never Smoker    Smokeless tobacco: Never Used   Vaping Use    Vaping Use: Never used   Substance Use Topics    Alcohol use: Yes     Comment: socially    Drug use: Never       Family History   Problem Relation Age of Onset    Coronary artery disease Mother     Coronary artery disease Father     No Known Problems Maternal Grandmother     No Known Problems Maternal Grandfather     Leukemia Paternal Grandmother 71    No Known Problems Paternal Grandfather     No Known Problems Son     No Known Problems Son        Review of Systems     As stated in the HPI  All other systems were reviewed and are negative  Physical Exam     /79   Pulse 62   Ht 5' 3 5" (1 613 m)   Wt 77 6 kg (171 lb)   BMI 29 82 kg/m²     GENERAL: This is a well-developed, well-nourished, age-appropriate patient in no acute distress  The patient is alert and oriented x3  Pleasant and cooperative  Eyes: Anicteric sclerae  Extraocular movements appear intact  HENT: Nares are patent with no drainage  Lungs: There is equal chest rise on inspection  Breathing is non-labored with no audible wheezing  Cardiovascular: No cyanosis  No upper extremity lymphadema  Skin: Skin is warm to touch  No obvious skin lesions or rashes other than described below    Neurologic: No ataxia  Psychiatric: Mood and affect are appropriate  Left wrist:   No erythema, ecchymosis or edema  Full supination/pronation   DPC 0   Full flexion and extension   Mild 1st dorsal compartment tenderness   Thumb CMC tender to palpation   Mild scaphoid tubercle tenderness   Mild snuff box tenderness  5/5 Motor to the APB, FDI, FDP2, FDP5, EDC  Sensation intact to light touch in the median, radial, and ulnar nerve distribution  - finkelstein test    + thumb abduction test        Data Review     Labs:  None    Electrodiagnostic Testing:  None    Imaging:  X-ray of the left wrist obtained in the office today were personally reviewed by myself and demonstrates healed scaphoid fracture  Very mild CMC arthrosis of the thumb    Assessment and Plan      Diagnoses and all orders for this visit:    Closed nondisplaced fracture of scaphoid of left wrist with routine healing, unspecified portion of scaphoid, subsequent encounter  -     XR wrist 3+ vw left; Future  -     Thumb Cude comf/Cool    Arthritis of carpometacarpal (CMC) joint of left thumb  -     Thumb Cude comf/Cool           74 y o  female with a left wrist scaphoid fracture, DOI 6/24/21  Cast was removed today and repeat x-rays were obtained  Scaphoid fracture is healed at this time  She was placed into a comfort cool brace, to be worn during the day with activities  [employer] may swim at this time, advised against heavy lifting/activities for 1 more month  She does have more symptomatic thumb CMC arthritis, which has flared due to immobilization  This pain should continue to improve with use of the Comfort Cool brace and anti-inflammatories  If pain fails to improve, she may follow up for further treatment options, such as [address] CSI's          Follow Up: 4-6 weeks if needed for thumb pain     To Do Next Visit: re-evaluation of thumb CMC pain     PROCEDURES PERFORMED:  Procedures  No Procedures performed today      Scribe Attestation    I,:  Albert Logan am acting as a scribe while in the presence of the attending physician :       I,:  Ilana Del Toro MD personally performed the services described in this documentation    as scribed in my presence :

## 2021-08-19 ENCOUNTER — OFFICE VISIT (OUTPATIENT)
Dept: FAMILY MEDICINE CLINIC | Facility: CLINIC | Age: 66
End: 2021-08-19
Payer: MEDICARE

## 2021-08-19 VITALS
DIASTOLIC BLOOD PRESSURE: 62 MMHG | OXYGEN SATURATION: 98 % | BODY MASS INDEX: 30.39 KG/M2 | HEART RATE: 64 BPM | SYSTOLIC BLOOD PRESSURE: 124 MMHG | HEIGHT: 64 IN | TEMPERATURE: 96.8 F | WEIGHT: 178 LBS

## 2021-08-19 DIAGNOSIS — G89.29 CHRONIC PAIN OF RIGHT KNEE: ICD-10-CM

## 2021-08-19 DIAGNOSIS — S29.012A UPPER BACK STRAIN, INITIAL ENCOUNTER: ICD-10-CM

## 2021-08-19 DIAGNOSIS — Z12.11 SCREENING FOR COLORECTAL CANCER: ICD-10-CM

## 2021-08-19 DIAGNOSIS — Z12.12 SCREENING FOR COLORECTAL CANCER: ICD-10-CM

## 2021-08-19 DIAGNOSIS — J40 BRONCHITIS: Primary | ICD-10-CM

## 2021-08-19 DIAGNOSIS — M25.561 CHRONIC PAIN OF RIGHT KNEE: ICD-10-CM

## 2021-08-19 PROCEDURE — U0005 INFEC AGEN DETEC AMPLI PROBE: HCPCS | Performed by: FAMILY MEDICINE

## 2021-08-19 PROCEDURE — 99214 OFFICE O/P EST MOD 30 MIN: CPT | Performed by: FAMILY MEDICINE

## 2021-08-19 PROCEDURE — U0003 INFECTIOUS AGENT DETECTION BY NUCLEIC ACID (DNA OR RNA); SEVERE ACUTE RESPIRATORY SYNDROME CORONAVIRUS 2 (SARS-COV-2) (CORONAVIRUS DISEASE [COVID-19]), AMPLIFIED PROBE TECHNIQUE, MAKING USE OF HIGH THROUGHPUT TECHNOLOGIES AS DESCRIBED BY CMS-2020-01-R: HCPCS | Performed by: FAMILY MEDICINE

## 2021-08-19 RX ORDER — GUAIFENESIN AND CODEINE PHOSPHATE 100; 10 MG/5ML; MG/5ML
SOLUTION ORAL
Qty: 120 ML | Refills: 1 | Status: SHIPPED | OUTPATIENT
Start: 2021-08-19 | End: 2021-11-15 | Stop reason: ALTCHOICE

## 2021-08-19 RX ORDER — BENZONATATE 200 MG/1
200 CAPSULE ORAL 3 TIMES DAILY PRN
Qty: 30 CAPSULE | Refills: 1 | Status: SHIPPED | OUTPATIENT
Start: 2021-08-19 | End: 2021-11-15 | Stop reason: ALTCHOICE

## 2021-08-19 RX ORDER — CYCLOBENZAPRINE HCL 5 MG
TABLET ORAL
Qty: 60 TABLET | Refills: 2 | Status: SHIPPED | OUTPATIENT
Start: 2021-08-19 | End: 2022-03-09

## 2021-08-19 NOTE — ADDENDUM NOTE
Addended by: Hailey Fang on: 8/19/2021 01:50 PM     Modules accepted: Orders
What Type Of Note Output Would You Prefer (Optional)?: Standard Output
How Severe Are Your Spot(S)?: mild
Have Your Spot(S) Been Treated In The Past?: has not been treated
Hpi Title: Evaluation of Skin Lesions

## 2021-08-19 NOTE — PATIENT INSTRUCTIONS
Suspect a viral URI, probably not COVID which is exactly what her  had  Will check for COVID  Benzonatate 200 mg 3 times a day for nonnarcotic cough relief  Robitussin with codeine at night  Cough drops her helpful  Tylenol for aches and pains  For knee and back pain, increase meloxicam to 15 mg daily  Cyclobenzaprine a muscle relaxer 5 mg, 1 or 2 at bedtime to help with sleep and back discomfort  May want to see 1 of the orthopod since [place], possibly Dr Brandt Zamora, for evaluation for arthroscopy of her knee since she does have a torn meniscus although most menisci tears at her age are thought to be degenerative and not really repairable  Return as scheduled

## 2021-08-19 NOTE — PROGRESS NOTES
Chief Complaint   Patient presents with    Back Pain     right upper back pain, and left side stiff neck    Cough     patient states that since last week she has been coughing         HPI   Here for a couple reasons  Having pain in her upper back  Notes that she was hunched over her computer writing a story for her grandson  Spent a lot of time hunched over coloring  Pain on the right side of her mid back and also in her neck  Taking her usual 7 5 mg meloxicam for her knee which is not helping her back  Also using Tylenol  On top of her chronic cough that she always has, for about 1 week, she has a more productive cough  Bring up some phlegm  May have some wheezing  Regular use of Flonase for postnasal drip  Now and then, feels that her ear is going to get sore but it does not  Also gets some lymph nodes in her neck  Probably not related to this URI   recently had similar URI  Tested negative for COVID  Grandson coming for the weekend in a couple of days  Continues with pain in her right knee on the medial aspect  Had a CT done of the knee ordered by Dr Solomon Dotson  It did show a torn meniscus      Past Medical History:   Diagnosis Date    Age-related osteoporosis without current pathological fracture 2/14/2022    Anxiety state 12/19/2014    Benign essential hypertension 1/14/2010    Chronic rhinitis 1/12/2012    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 hair loss 2/4/2020    Fracture of multiple ribs of left side 12/21/2020    Hyponatremia 10/2/2020    Major depressive disorder with single episode, in full remission ([address] ) 10/10/2019    Mild persistent asthma without complication 3/1/3440    Right chronic serous otitis media 11/7/2017    Added automatically from request for surgery [id number]    Scapula fracture 12/21/2020    Temporal arteritis ([address] ) 9/4/2020        Past Surgical History:   Procedure Laterality Date    LAPAROSCOPY      X2 for endometriosis    UT TEMPORAL ARTERY LIGATN OR BX Left 8/14/2020    Procedure: TEMPORAL ARTERY BIOPSY;  Surgeon: Dorene Nunn MD;  Location: [address] [place] [address];  Service: General       Social History     Tobacco Use    Smoking status: Never Smoker    Smokeless tobacco: Never Used   Substance Use Topics    Alcohol use: Yes     Comment: socially       Social History     Social History Narrative     since 1981  Two sons  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, gardening, walking, needle work, painting, writing books  The following portions of the patient's history were reviewed and updated as appropriate: allergies, current medications, past family history, past medical history, past social history, past surgical history and problem list       Review of Systems   Constitutional: Negative for activity change, appetite change and fever  HENT: Positive for rhinorrhea  Negative for ear pain, hearing loss and sore throat  Eyes: Negative for visual disturbance  Respiratory: Positive for cough  Negative for shortness of breath and wheezing  Cardiovascular: Negative for chest pain and leg swelling  Gastrointestinal: Negative for abdominal pain, constipation and diarrhea  Genitourinary: Negative for difficulty urinating  Musculoskeletal: Negative for arthralgias and back pain  Skin: Negative for rash  Neurological: Negative for headaches  Psychiatric/Behavioral: Negative for dysphoric mood  The patient is not nervous/anxious  /62 (BP Location: Left arm, Patient Position: Sitting, Cuff Size: Adult)   Pulse 64   Temp (!) 96 8 °F (36 °C) (Temporal)   Ht 5' 3 5" (1 613 m)   Wt 80 7 kg (178 lb)   SpO2 98%   BMI 31 04 kg/m²      Physical Exam   Appears well  Both eardrums are white  Throat reveals no erythema  Lungs are clear  No wheezing  Heart regular  No significant palpable tenderness on the right side of her upper back              Current Outpatient Medications:   alendronate (FOSAMAX) 70 mg tablet, TAKE 1 TABLET BY MOUTH ONE TIME PER WEEK, Disp: 12 tablet, Rfl: 3    Aluminum & Magnesium Hydroxide (MAGNESIUM-ALUMINUM PO), Take by mouth, Disp: , Rfl:     amLODIPine (NORVASC) 5 mg tablet, TAKE 1 TABLET BY MOUTH DAILY ON MONDAYS, WEDNESDAYS, AND FRIDAYS AND 1/2 TABLET ALL OTHER DAYS , Disp: 64 tablet, Rfl: 5    olmesartan (BENICAR) 20 mg tablet, 1/2 tablet daily or as ordered, Disp: 90 tablet, Rfl: 3    Omega-3 1000 MG CAPS, Take by mouth, Disp: , Rfl:     spironolactone (ALDACTONE) 50 mg tablet, TAKE 1 TABLET BY MOUTH EVERY DAY, Disp: 90 tablet, Rfl: 5    albuterol (PROVENTIL HFA,VENTOLIN HFA) 90 mcg/act inhaler, Inhale 2 puffs every 6 (six) hours as needed for wheezing or shortness of breath (Patient not taking: Reported on 2/14/2022 ), Disp: 1 Inhaler, Rfl: 5    cyclobenzaprine (FLEXERIL) 5 mg tablet, One-2 tabs at bedtime for muscle spasm (Patient not taking: Reported on 2/14/2022 ), Disp: 60 tablet, Rfl: 2    fluticasone (FLONASE) 50 mcg/act nasal spray, SPRAY 1 SPRAY INTO EACH NOSTRIL EVERY DAY (Patient not taking: Reported on 2/14/2022), Disp: 48 mL, Rfl: 3    metoprolol succinate (TOPROL-XL) 100 mg 24 hr tablet, TAKE 1 TABLET BY MOUTH EVERY DAY, Disp: 90 tablet, Rfl: 3     No problem-specific Assessment & Plan notes found for this encounter  Diagnoses and all orders for this visit:    Bronchitis  -     Discontinue: benzonatate (TESSALON) 200 MG capsule; Take 1 capsule (200 mg total) by mouth 3 (three) times a day as needed for cough  -     Discontinue: guaifenesin-codeine (GUAIFENESIN AC) 100-10 MG/5ML liquid; 1-2 tsp every 4 hours as needed for cough (Patient not taking: Reported on 11/15/2021 )  -     Novel Coronavirus (COVID-19), PCR [facility] Collected in Office    Upper back strain, initial encounter  -     cyclobenzaprine (FLEXERIL) 5 mg tablet;  One-2 tabs at bedtime for muscle spasm (Patient not taking: Reported on 2/14/2022 )    Screening for colorectal cancer  -     Ambulatory referral to Colorectal Surgery; Future  -     Cologuard    Chronic pain of right knee  -     Ambulatory referral to Orthopedic Surgery; Future        Patient Instructions   Suspect a viral URI, probably not COVID which is exactly what her  had  Will check for COVID  Benzonatate 200 mg 3 times a day for nonnarcotic cough relief  Robitussin with codeine at night  Cough drops her helpful  Tylenol for aches and pains  For knee and back pain, increase meloxicam to 15 mg daily  Cyclobenzaprine a muscle relaxer 5 mg, 1 or 2 at bedtime to help with sleep and back discomfort  May want to see 1 of the orthopod since [place], possibly Dr Renetta Zaragoza, for evaluation for arthroscopy of her knee since she does have a torn meniscus although most menisci tears at her age are thought to be degenerative and not really repairable  Return as scheduled

## 2021-08-20 LAB — SARS-COV-2 RNA RESP QL NAA+PROBE: NEGATIVE

## 2021-09-28 ENCOUNTER — OFFICE VISIT (OUTPATIENT)
Dept: OBGYN CLINIC | Facility: MEDICAL CENTER | Age: 66
End: 2021-09-28
Payer: MEDICARE

## 2021-09-28 ENCOUNTER — APPOINTMENT (OUTPATIENT)
Dept: RADIOLOGY | Facility: MEDICAL CENTER | Age: 66
End: 2021-09-28
Payer: MEDICARE

## 2021-09-28 VITALS
HEIGHT: 64 IN | BODY MASS INDEX: 29.57 KG/M2 | DIASTOLIC BLOOD PRESSURE: 85 MMHG | SYSTOLIC BLOOD PRESSURE: 151 MMHG | WEIGHT: 173.2 LBS | HEART RATE: 61 BPM

## 2021-09-28 DIAGNOSIS — S83.241A TEAR OF MEDIAL MENISCUS OF RIGHT KNEE, CURRENT, UNSPECIFIED TEAR TYPE, INITIAL ENCOUNTER: ICD-10-CM

## 2021-09-28 DIAGNOSIS — M25.561 CHRONIC PAIN OF RIGHT KNEE: ICD-10-CM

## 2021-09-28 DIAGNOSIS — M17.11 PRIMARY OSTEOARTHRITIS OF RIGHT KNEE: Primary | ICD-10-CM

## 2021-09-28 DIAGNOSIS — G89.29 CHRONIC PAIN OF RIGHT KNEE: ICD-10-CM

## 2021-09-28 PROCEDURE — 73564 X-RAY EXAM KNEE 4 OR MORE: CPT

## 2021-09-28 PROCEDURE — 73562 X-RAY EXAM OF KNEE 3: CPT

## 2021-09-28 PROCEDURE — 99214 OFFICE O/P EST MOD 30 MIN: CPT | Performed by: ORTHOPAEDIC SURGERY

## 2021-09-28 NOTE — PROGRESS NOTES
Orthopaedic Surgery - Office Note  Eusebio Velasquez (74 y o  female)   : 1955   MRN: [id number]  Encounter Date: 2021    Chief Complaint   Patient presents with    Right Knee - Pain       Assessment / Plan  Right knee moderate osteoarthritis with secondary medial meniscal tear    · Activity as tolerated  · Medial  brace was adjusted during today's visit, however she felt like it did not provide any relief so decided to just attempt home exercise program   · Home exercise program reviewed  · Anti-inflammatories or Tylenol prn pain  Return in about 2 months (around 2021) for Recheck  History of Present Illness  Eusebio Velasquez is a 74 y o  female who presents presents today for initial evaluation right knee pain referred by her PCP  He states a little over a year ago she was on her knees paining and the following day she had increasing pain in her right knee  She states that her pain is very achy and sometimes sharp activity along the medial aspect of her knee  She mentions that she has started having to limp while walking due to his pain which is now cause some bilateral hip pain  She has difficulty with walking as well as going up and down steps  She also notices that with activity her knee sometimes swells  She has gotten her knee aspirated multiple times the past, most recently in 2021  She has received multiple cortisone injections which provided no relief  She also received both step 1 and 3 step viscosupplementation, most recently towards the end of May 2021  This provided her with significant pain relief  She has not attempted any formal physical therapy  She denies any new injury or trauma to her knee  She denies any distal paresthesias  Review of Systems  Pertinent items are noted in HPI  All other systems were reviewed and are negative      Physical Exam  /85   Pulse 61   Ht 5' 3 5" (1 613 m)   Wt 78 6 kg (173 lb 3 2 oz)   BMI 30 20 kg/m² Cons: Appears well  No apparent distress  Psych: Alert  Oriented x3  Mood and affect normal   Eyes: PERRLA, EOMI  Resp: Normal effort  No audible wheezing or stridor  CV: Palpable pulse  No discernable arrhythmia  No LE edema  Lymph:  No palpable cervical, axillary, or inguinal lymphadenopathy  Skin: Warm  No palpable masses  No visible lesions  Neuro: Normal muscle tone  Normal and symmetric DTR's  Right Knee Exam  Alignment:  Normal knee alignment  Inspection:  No swelling  No edema  No erythema  Palpation:  Mild medial joint line tenderness  ROM:  Knee Extension 0  Knee Flexion 135  Strength:  Able to actively extend knee against gravity  Stability:  No objective knee instability  Stable Varus / Valgus stress, Lachman, and Posterior drawer  Tests:  (+) Jaxson  Patella:  Normal patellar mobility  Neurovascular:  Sensation intact in DP/SP/Kim/Sa/T nerve distributions  2+ DP & PT pulses  Gait:  Normal     Studies Reviewed  I have personally reviewed pertinent films in PACS  XR of right knee - Moderate joint space narrowing  MRI of right knee - Partially torn medial meniscus at the posterior root    Procedures  No procedures today  Medical, Surgical, Family, and Social History  The patient's medical history, family history, and social history, were reviewed and updated as appropriate      Past Medical History:   Diagnosis Date    Anxiety state 12/19/2014    Benign essential hypertension 1/14/2010    Chronic rhinitis 1/12/2012    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 hair loss 2/4/2020    Fracture of multiple ribs of left side 12/21/2020    Hyponatremia 10/2/2020    Major depressive disorder with single episode, in full remission ([address] ) 10/10/2019    Mild persistent asthma without complication 9/3/7011    Right chronic serous otitis media 11/7/2017    Added automatically from request for surgery [id number]    Scapula fracture 12/21/2020    Temporal arteritis ([address] ) 9/4/2020       Past Surgical History: Procedure Laterality Date    LAPAROSCOPY      X2 for endometriosis    MI TEMPORAL ARTERY LIGATN OR BX Left 8/14/2020    Procedure: TEMPORAL ARTERY BIOPSY;  Surgeon: Reinaldo Bishop MD;  Location: [address] [place] [address] OR;  Service: General       Family History   Problem Relation Age of Onset    Coronary artery disease Mother     Coronary artery disease Father     No Known Problems Maternal Grandmother     No Known Problems Maternal Grandfather     Leukemia Paternal Grandmother 71    No Known Problems Paternal Grandfather     No Known Problems Son     No Known Problems Son        Social History     Occupational History    Not on file   Tobacco Use    Smoking status: Never Smoker    Smokeless tobacco: Never Used   Vaping Use    Vaping Use: Never used   Substance and Sexual Activity    Alcohol use: Yes     Comment: socially    Drug use: Never    Sexual activity: Not Currently       No Known Allergies      Current Outpatient Medications:     albuterol (PROVENTIL HFA,VENTOLIN HFA) 90 mcg/act inhaler, Inhale 2 puffs every 6 (six) hours as needed for wheezing or shortness of breath (Patient not taking: Reported on 8/19/2021), Disp: 1 Inhaler, Rfl: 5    alendronate (FOSAMAX) 70 mg tablet, TAKE 1 TABLET BY MOUTH ONE TIME PER WEEK, Disp: 12 tablet, Rfl: 3    Aluminum & Magnesium Hydroxide (MAGNESIUM-ALUMINUM PO), Take by mouth, Disp: , Rfl:     amLODIPine (NORVASC) 5 mg tablet, TAKE 1 TABLET BY MOUTH DAILY ON MONDAYS, WEDNESDAYS, AND FRIDAYS AND 1/2 TABLET ALL OTHER DAYS , Disp: 64 tablet, Rfl: 5    benzonatate (TESSALON) 200 MG capsule, Take 1 capsule (200 mg total) by mouth 3 (three) times a day as needed for cough, Disp: 30 capsule, Rfl: 1    cyclobenzaprine (FLEXERIL) 5 mg tablet, One-2 tabs at bedtime for muscle spasm, Disp: 60 tablet, Rfl: 2    fluticasone (FLONASE) 50 mcg/act nasal spray, 1 spray into each nostril daily, Disp: 3 Bottle, Rfl: 3    guaifenesin-codeine (GUAIFENESIN AC) 100-10 MG/5ML liquid, 1-2 tsp every 4 hours as needed for cough, Disp: 120 mL, Rfl: 1    meloxicam (MOBIC) 7 5 mg tablet, Take 1 tablet (7 5 mg total) by mouth daily, Disp: 90 tablet, Rfl: 3    metoprolol succinate (TOPROL-XL) 100 mg 24 hr tablet, TAKE 1 TABLET BY MOUTH EVERY DAY, Disp: 90 tablet, Rfl: 3    olmesartan (BENICAR) 20 mg tablet, 1/2 tablet daily or as ordered, Disp: 90 tablet, Rfl: 3    Omega-3 1000 MG CAPS, Take by mouth, Disp: , Rfl:     sertraline (ZOLOFT) 50 mg tablet, TAKE 1 TABLET BY MOUTH EVERY DAY, Disp: 90 tablet, Rfl: 3    spironolactone (ALDACTONE) 50 mg tablet, TAKE 1 TABLET BY MOUTH EVERY DAY, Disp: 90 tablet, Rfl: 5      [employer]    I,:  Janet Fernandes am acting as a scribe while in the presence of the attending physician :       I,:  Sri Leon MD personally performed the services described in this documentation    as scribed in my presence :

## 2021-11-15 ENCOUNTER — OFFICE VISIT (OUTPATIENT)
Dept: FAMILY MEDICINE CLINIC | Facility: CLINIC | Age: 66
End: 2021-11-15
Payer: MEDICARE

## 2021-11-15 VITALS
DIASTOLIC BLOOD PRESSURE: 80 MMHG | HEIGHT: 64 IN | HEART RATE: 62 BPM | BODY MASS INDEX: 29.4 KG/M2 | OXYGEN SATURATION: 97 % | TEMPERATURE: 95.2 F | WEIGHT: 172.2 LBS | SYSTOLIC BLOOD PRESSURE: 122 MMHG

## 2021-11-15 DIAGNOSIS — F10.10 ALCOHOL USE DISORDER, MILD, ABUSE: ICD-10-CM

## 2021-11-15 DIAGNOSIS — M17.11 PRIMARY OSTEOARTHRITIS OF RIGHT KNEE: ICD-10-CM

## 2021-11-15 DIAGNOSIS — I10 BENIGN ESSENTIAL HYPERTENSION: Primary | ICD-10-CM

## 2021-11-15 DIAGNOSIS — Z23 FLU VACCINE NEED: ICD-10-CM

## 2021-11-15 DIAGNOSIS — M31.6 TEMPORAL ARTERITIS (HCC): ICD-10-CM

## 2021-11-15 DIAGNOSIS — F32.5 MAJOR DEPRESSIVE DISORDER WITH SINGLE EPISODE, IN FULL REMISSION (HCC): ICD-10-CM

## 2021-11-15 PROBLEM — Z79.52 CURRENT CHRONIC USE OF SYSTEMIC STEROIDS: Status: RESOLVED | Noted: 2020-09-04 | Resolved: 2021-11-15

## 2021-11-15 PROCEDURE — G0008 ADMIN INFLUENZA VIRUS VAC: HCPCS

## 2021-11-15 PROCEDURE — 90662 IIV NO PRSV INCREASED AG IM: CPT

## 2021-11-15 PROCEDURE — 99214 OFFICE O/P EST MOD 30 MIN: CPT | Performed by: FAMILY MEDICINE

## 2021-12-13 DIAGNOSIS — Z00.00 MEDICARE ANNUAL WELLNESS VISIT, SUBSEQUENT: ICD-10-CM

## 2021-12-13 RX ORDER — FLUTICASONE PROPIONATE 50 MCG
SPRAY, SUSPENSION (ML) NASAL
Qty: 48 ML | Refills: 3 | Status: SHIPPED | OUTPATIENT
Start: 2021-12-13

## 2022-02-14 ENCOUNTER — OFFICE VISIT (OUTPATIENT)
Dept: FAMILY MEDICINE CLINIC | Facility: CLINIC | Age: 67
End: 2022-02-14
Payer: MEDICARE

## 2022-02-14 VITALS
HEART RATE: 60 BPM | BODY MASS INDEX: 29.71 KG/M2 | SYSTOLIC BLOOD PRESSURE: 120 MMHG | HEIGHT: 64 IN | OXYGEN SATURATION: 96 % | DIASTOLIC BLOOD PRESSURE: 80 MMHG | TEMPERATURE: 96.8 F | WEIGHT: 174 LBS

## 2022-02-14 DIAGNOSIS — M17.11 PRIMARY OSTEOARTHRITIS OF RIGHT KNEE: ICD-10-CM

## 2022-02-14 DIAGNOSIS — M31.6 TEMPORAL ARTERITIS (HCC): ICD-10-CM

## 2022-02-14 DIAGNOSIS — I10 BENIGN ESSENTIAL HYPERTENSION: ICD-10-CM

## 2022-02-14 DIAGNOSIS — L65.8 FEMALE PATTERN HAIR LOSS: ICD-10-CM

## 2022-02-14 DIAGNOSIS — M81.0 AGE-RELATED OSTEOPOROSIS WITHOUT CURRENT PATHOLOGICAL FRACTURE: ICD-10-CM

## 2022-02-14 DIAGNOSIS — Z00.00 MEDICARE ANNUAL WELLNESS VISIT, SUBSEQUENT: Primary | ICD-10-CM

## 2022-02-14 DIAGNOSIS — F32.5 MAJOR DEPRESSIVE DISORDER WITH SINGLE EPISODE, IN FULL REMISSION (HCC): ICD-10-CM

## 2022-02-14 PROBLEM — R51.9 HEADACHE: Status: RESOLVED | Noted: 2020-08-14 | Resolved: 2022-02-14

## 2022-02-14 PROBLEM — F10.20 ALCOHOL DEPENDENCE (HCC): Status: RESOLVED | Noted: 2020-12-21 | Resolved: 2022-02-14

## 2022-02-14 PROCEDURE — 99214 OFFICE O/P EST MOD 30 MIN: CPT | Performed by: FAMILY MEDICINE

## 2022-02-14 PROCEDURE — G0439 PPPS, SUBSEQ VISIT: HCPCS | Performed by: FAMILY MEDICINE

## 2022-02-14 RX ORDER — TRIAMCINOLONE ACETONIDE 0.15 MG/G
AEROSOL, SPRAY TOPICAL 2 TIMES DAILY
Qty: 63 G | Refills: 5 | Status: SHIPPED | OUTPATIENT
Start: 2022-02-14

## 2022-02-14 NOTE — PROGRESS NOTES
Assessment and Plan:     Problem List Items Addressed This Visit     None      Visit Diagnoses     Medicare annual wellness visit, subsequent    -  Primary           Preventive health issues were discussed with patient, and age appropriate screening tests were ordered as noted in patient's After Visit Summary  Personalized health advice and appropriate referrals for health education or preventive services given if needed, as noted in patient's After Visit Summary  History of Present Illness:     Patient presents for Welcome to Medicare visit       Patient Care Team:  Rafael Pittman MD as PCP - General (Family Medicine)     Review of Systems:     Review of Systems   Problem List:     Patient Active Problem List   Diagnosis    Anxiety state    Benign essential hypertension    Chronic rhinitis    Mild intermittent asthma without complication    Major depressive disorder with single episode, in full remission ([address] )    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 hair loss    Headache    Temporal arteritis ([address] )    Alcohol dependence ([address] )    Primary osteoarthritis of right knee    Alcohol use disorder, mild, abuse      Past Medical and Surgical History:     Past Medical History:   Diagnosis Date    Anxiety state 12/19/2014    Benign essential hypertension 1/14/2010    Chronic rhinitis 1/12/2012    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 hair loss 2/4/2020    Fracture of multiple ribs of left side 12/21/2020    Hyponatremia 10/2/2020    Major depressive disorder with single episode, in full remission ([address] ) 10/10/2019    Mild persistent asthma without complication 2/3/2368    Right chronic serous otitis media 11/7/2017    Added automatically from request for surgery [id number]    Scapula fracture 12/21/2020    Temporal arteritis ([facility] ) 9/4/2020     Past Surgical History:   Procedure Laterality Date    LAPAROSCOPY      X2 for endometriosis    AL TEMPORAL ARTERY LIGATN OR BX Left 8/14/2020    Procedure: TEMPORAL ARTERY BIOPSY;  Surgeon: Sivan Ace MD;  Location: [address] [place] [address] MAIN OR;  Service: General      Family History:     Family History   Problem Relation Age of Onset    Coronary artery disease Mother     Coronary artery disease Father     No Known Problems Maternal Grandmother     No Known Problems Maternal Grandfather     Leukemia Paternal Grandmother 71    No Known Problems Paternal Grandfather     No Known Problems Son     No Known Problems Son       Social History:     Social History     Socioeconomic History    Marital status: /Civil Union     Spouse name: Not on file    Number of children: 2    Years of education: Not on file    Highest education level: Not on file   Occupational History    Not on file   Tobacco Use    Smoking status: Never Smoker    Smokeless tobacco: Never Used   Vaping Use    Vaping Use: Never used   Substance and Sexual Activity    Alcohol use: Yes     Comment: socially    Drug use: Never    Sexual activity: Not Currently   Other Topics Concern    Not on file   Social History Narrative     since 1981  Two sons  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, gardening, walking, needle work, painting, writing books  Social Determinants of Health     Financial Resource Strain: Not on file   Food Insecurity: Not on file   Transportation Needs: Not on file   Physical Activity: Not on file   Stress: Not on file   Social Connections: Not on file   Intimate Partner Violence: Not on file   Housing Stability: Not on file      Medications and Allergies:     Current Outpatient Medications   Medication Sig Dispense Refill    alendronate (FOSAMAX) 70 mg tablet TAKE 1 TABLET BY MOUTH ONE TIME PER WEEK 12 tablet 3    Aluminum & Magnesium Hydroxide (MAGNESIUM-ALUMINUM PO) Take by mouth      amLODIPine (NORVASC) 5 mg tablet TAKE 1 TABLET BY MOUTH DAILY ON MONDAYS, WEDNESDAYS, AND FRIDAYS AND 1/2 TABLET ALL OTHER DAYS   64 tablet 5    metoprolol succinate (TOPROL-XL) 100 mg 24 hr tablet TAKE 1 TABLET BY MOUTH EVERY DAY 90 tablet 3    olmesartan (BENICAR) 20 mg tablet 1/2 tablet daily or as ordered 90 tablet 3    Omega-3 1000 MG CAPS Take by mouth      spironolactone (ALDACTONE) 50 mg tablet TAKE 1 TABLET BY MOUTH EVERY DAY 90 tablet 5    albuterol (PROVENTIL HFA,VENTOLIN HFA) 90 mcg/act inhaler Inhale 2 puffs every 6 (six) hours as needed for wheezing or shortness of breath (Patient not taking: Reported on 2/14/2022 ) 1 Inhaler 5    cyclobenzaprine (FLEXERIL) 5 mg tablet One-2 tabs at bedtime for muscle spasm (Patient not taking: Reported on 2/14/2022 ) 60 tablet 2    Diclofenac Sodium (VOLTAREN) 1 % Apply 2 g topically 4 (four) times a day 500 g 5    fluticasone (FLONASE) 50 mcg/act nasal spray SPRAY 1 SPRAY INTO EACH NOSTRIL EVERY DAY (Patient not taking: Reported on 2/14/2022) 48 mL 3    sertraline (ZOLOFT) 50 mg tablet TAKE 1 TABLET BY MOUTH EVERY DAY (Patient not taking: Reported on 2/14/2022) 90 tablet 3     No current facility-administered medications for this visit  No Known Allergies   Immunizations:     Immunization History   Administered Date(s) Administered    COVID-19 MODERNA VACC 0 5 ML IM 01/25/2021, 02/22/2021    INFLUENZA 10/03/2016    Influenza Split High Dose Preservative Free IM 10/15/2018    Influenza, high dose seasonal 0 7 mL 11/01/2019, 10/02/2020, 11/15/2021    Pneumococcal Conjugate 13-Valent 04/10/2018    Pneumococcal Polysaccharide PPV23 10/06/2017, 11/01/2019    Tdap 04/28/2012, 10/15/2018    Zoster 12/02/2013      Health Maintenance:         Topic Date Due    Colorectal Cancer Screening  Never done    Breast Cancer Screening: Mammogram  07/09/2022    Hepatitis C Screening  Completed         Topic Date Due    COVID-19 Vaccine (3 - Booster for Andrade Sheerer series) 07/22/2021      Medicare Screening Tests and Risk Assessments:     Aldair Prather is here for her Subsequent Wellness visit       Health Risk Assessment:   Patient rates overall health as good  Patient feels that their physical health rating is slightly better  Patient is very satisfied with their life  Eyesight was rated as same  Hearing was rated as same  Patient feels that their emotional and mental health rating is slightly better  Patients states they are never, rarely angry  Patient states they are sometimes unusually tired/fatigued  Pain experienced in the last 7 days has been a lot  Patient's pain rating has been 7/10  Patient states that she has experienced no weight loss or gain in last 6 months  Depression Screening:   PHQ-9 Score: 0      Fall Risk Screening: In the past year, patient has experienced: history of falling in past year    Number of falls: 1  Injured during fall?: Yes    Feels unsteady when standing or walking?: Yes    Worried about falling?: Yes      Urinary Incontinence Screening:   Patient has not leaked urine accidently in the last six months  Home Safety:  Patient has trouble with stairs inside or outside of their home  Patient has working smoke alarms and has working carbon monoxide detector  Home safety hazards include: none  Nutrition:   Current diet is Regular  Medications:   Patient is currently taking over-the-counter supplements  OTC medications include: see medication list  Patient is able to manage medications  Activities of Daily Living (ADLs)/Instrumental Activities of Daily Living (IADLs):   Walk and transfer into and out of bed and chair?: Yes  Dress and groom yourself?: Yes    Bathe or shower yourself?: Yes    Feed yourself?  Yes  Do your laundry/housekeeping?: Yes  Manage your money, pay your bills and track your expenses?: Yes  Make your own meals?: Yes    Do your own shopping?: Yes    Previous Hospitalizations:   Any hospitalizations or ED visits within the last 12 months?: No      PREVENTIVE SCREENINGS      Cardiovascular Screening:    General: Screening Current      Diabetes Screening:     General: Screening Current      Breast Cancer Screening:     General: Screening Current      Cervical Cancer Screening:    General: Screening Not Indicated      Lung Cancer Screening:     General: Screening Not Indicated      Hepatitis C Screening:    General: Screening Current    Screening, Brief Intervention, and Referral to Treatment (SBIRT)    Screening  Typical number of drinks in a day: 2  Typical number of drinks in a week: 8  Interpretation: Low risk drinking behavior      Single Item Drug Screening:  How often have you used an illegal drug (including marijuana) or a prescription medication for non-medical reasons in the past year? never    Single Item Drug Screen Score: 0  Interpretation: Negative screen for possible drug use disorder    No exam data present     Physical Exam:     /80 (BP Location: Left arm, Patient Position: Sitting, Cuff Size: Adult)   Pulse 60   Temp (!) 96 8 °F (36 °C) (Temporal)   Ht 5' 3 5" (1 613 m)   Wt 78 9 kg (174 lb)   SpO2 96%   BMI 30 34 kg/m²     Physical Exam     Edgard Parikh MD

## 2022-02-14 NOTE — PROGRESS NOTES
Chief Complaint   Patient presents with    Medicare Wellness Visit    Follow-up        HPI   Here for follow-up of hypertension, hyponatremia, temporal arteritis, and depression  And knee DJD  And Medicare Wellness exam    Continues to have pain in her knee  Was seen by Orthopedics who noted moderate osteoarthritis with secondary medial meniscus tear  She is unable to do the things she would like to  No longer taking meloxicam   It did not help nor did the combination of Tylenol and Aleve  Unable to breathe out of her left nostril  For a week  Using Flonase and saline solution  Not using Afrin  Other concerns hair loss  Has found some itchy patches on her scalp  Continues on spironolactone  Gets some itching in her scalp  Has tapered sertraline to 25 mg every other day  Mood is okay  Was having weird dreams  Alcohol is under control          Past Medical History:   Diagnosis Date    Age-related osteoporosis without current pathological fracture 2/14/2022    Anxiety state 12/19/2014    Benign essential hypertension 1/14/2010    Chronic rhinitis 1/12/2012    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 hair loss 2/4/2020    Fracture of multiple ribs of left side 12/21/2020    Hyponatremia 10/2/2020    Major depressive disorder with single episode, in full remission ([address] ) 10/10/2019    Mild persistent asthma without complication 0/5/8750    Right chronic serous otitis media 11/7/2017    Added automatically from request for surgery [id number]    Scapula fracture 12/21/2020    Temporal arteritis ([facility] [address] ) 9/4/2020        Past Surgical History:   Procedure Laterality Date    LAPAROSCOPY      X2 for endometriosis    AR TEMPORAL ARTERY LIGATN OR BX Left 8/14/2020    Procedure: TEMPORAL ARTERY BIOPSY;  Surgeon: Dean Cooley MD;  Location: [facility] MAIN OR;  Service: General       Social History     Tobacco Use    Smoking status: Never Smoker    Smokeless tobacco: Never Used   Substance Use Topics    Alcohol use: Yes     Comment: socially       Social History     Social History Narrative     since 1981  Two sons  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, gardening, walking, needle work, painting, writing books  The following portions of the patient's history were reviewed and updated as appropriate: allergies, current medications, past family history, past medical history, past social history, past surgical history and problem list       Review of Systems   Constitutional: Negative for activity change and appetite change  HENT: Negative for ear pain and hearing loss  Eyes: Negative for visual disturbance  Respiratory: Negative for shortness of breath and wheezing  Cardiovascular: Negative for chest pain and leg swelling  Gastrointestinal: Negative for abdominal pain, constipation and diarrhea  Genitourinary: Negative for difficulty urinating  Musculoskeletal: Negative for arthralgias and back pain  Knee pain R     Skin: Negative for rash  Neurological: Negative for headaches  Psychiatric/Behavioral: Negative for dysphoric mood  The patient is not nervous/anxious  /80 (BP Location: Left arm, Patient Position: Sitting, Cuff Size: Adult)   Pulse 60   Temp (!) 96 8 °F (36 °C) (Temporal)   Ht 5' 3 5" (1 613 m)   Wt 78 9 kg (174 lb)   SpO2 96%   BMI 30 34 kg/m²      Physical Exam    Repeat blood pressure 140/80  Exam of the scalp does not reveal any scaly patches  Hair is somewhat thinning  Lungs are clear  Heart regular  Mood is okay  Exam of the right knee does not reveal any swelling  She has good range of motion  There is tenderness on the medial and lateral aspect of the leg wall below the knee joint  Mild tenderness over the medial joint line  Also tenderness at the tibial tuberosity  Left nasal passage reveals a large old blood clot              Current Outpatient Medications:     alendronate (FOSAMAX) 70 mg tablet, TAKE 1 TABLET BY MOUTH ONE TIME PER WEEK, Disp: 12 tablet, Rfl: 3    Aluminum & Magnesium Hydroxide (MAGNESIUM-ALUMINUM PO), Take by mouth, Disp: , Rfl:     amLODIPine (NORVASC) 5 mg tablet, TAKE 1 TABLET BY MOUTH DAILY ON MONDAYS, WEDNESDAYS, AND FRIDAYS AND 1/2 TABLET ALL OTHER DAYS , Disp: 64 tablet, Rfl: 5    metoprolol succinate (TOPROL-XL) 100 mg 24 hr tablet, TAKE 1 TABLET BY MOUTH EVERY DAY, Disp: 90 tablet, Rfl: 3    olmesartan (BENICAR) 20 mg tablet, 1/2 tablet daily or as ordered, Disp: 90 tablet, Rfl: 3    Omega-3 1000 MG CAPS, Take by mouth, Disp: , Rfl:     spironolactone (ALDACTONE) 50 mg tablet, TAKE 1 TABLET BY MOUTH EVERY DAY, Disp: 90 tablet, Rfl: 5    albuterol (PROVENTIL HFA,VENTOLIN HFA) 90 mcg/act inhaler, Inhale 2 puffs every 6 (six) hours as needed for wheezing or shortness of breath (Patient not taking: Reported on 2/14/2022 ), Disp: 1 Inhaler, Rfl: 5    cyclobenzaprine (FLEXERIL) 5 mg tablet, One-2 tabs at bedtime for muscle spasm (Patient not taking: Reported on 2/14/2022 ), Disp: 60 tablet, Rfl: 2    fluticasone (FLONASE) 50 mcg/act nasal spray, SPRAY 1 SPRAY INTO EACH NOSTRIL EVERY DAY (Patient not taking: Reported on 2/14/2022), Disp: 48 mL, Rfl: 3    triamcinolone (KENALOG) 0 147 MG/GM topical spray, Apply topically 2 (two) times a day, Disp: 63 g, Rfl: 5     No problem-specific Assessment & Plan notes found for this encounter  Diagnoses and all orders for this visit:    Medicare annual wellness visit, subsequent    Benign essential hypertension    Major depressive disorder with single episode, in full remission [facility])    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 hair loss  -     triamcinolone (KENALOG) 0 147 MG/GM topical spray; Apply topically 2 (two) times a day    Primary osteoarthritis of right knee    Age-related osteoporosis without current pathological fracture    Temporal arteritis [facility])        Patient Instructions     Medicare wellness exam is completed    Blood pressure is adequately controlled  Blood work was done in July revealing her usual low sodium which is well tolerated  Some discussion about her knee  Suggest seeing the orthopedic surgeon again to see what his suggestions might be since her activities are limited because of discomfort  Continue spironolactone for hair loss and suggest trying over-the-counter minoxidil although it needs to be used indefinitely  For itchy scalp, trial of Kenalog spray  At this point, she can blow hard to get the clot out of her not left nasal passage  Continue Fosamax once weekly for osteoporosis  Planning to get her COVID booster  Would like to ignore the colon cancer screening suggestion  Suggest doing in any way  Recheck in 4 months  Medicare Preventive Visit Patient Instructions  Thank you for completing your Welcome to Medicare Visit or Medicare Annual Wellness Visit today  Your next wellness visit will be due in one year (2/15/2023)  The screening/preventive services that you may require over the next 5-10 years are detailed below  Some tests may not apply to you based off risk factors and/or age  Screening tests ordered at today's visit but not completed yet may show as past due  Also, please note that scanned in results may not display below  Preventive Screenings:  Service Recommendations Previous Testing/Comments   Colorectal Cancer Screening  * Colonoscopy    * Fecal Occult Blood Test (FOBT)/Fecal Immunochemical Test (FIT)  * Fecal DNA/Cologuard Test  * Flexible Sigmoidoscopy Age: 54-65 years old   Colonoscopy: every 10 years (may be performed more frequently if at higher risk)  OR  FOBT/FIT: every 1 year  OR  Cologuard: every 3 years  OR  Sigmoidoscopy: every 5 years  Screening may be recommended earlier than age 48 if at higher risk for colorectal cancer  Also, an individualized decision between you and your healthcare provider will decide whether screening between the ages of 74-80 would be appropriate  Colonoscopy: Not on file  FOBT/FIT: Not on file  Cologuard: Not on file  Sigmoidoscopy: Not on file          Breast Cancer Screening Age: 36 years old  Frequency: every 1-2 years  Not required if history of left and right mastectomy Mammogram: 07/09/2021        Cervical Cancer Screening Between the ages of 21-29, pap smear recommended once every 3 years  Between the ages of 33-67, can perform pap smear with HPV co-testing every 5 years  Recommendations may differ for women with a history of total hysterectomy, cervical cancer, or abnormal pap smears in past  Pap Smear: Not on file        Hepatitis C Screening Once for adults born between 1945 and 1965  More frequently in patients at high risk for Hepatitis C Hep C Antibody: 10/28/2019        Diabetes Screening 1-2 times per year if you're at risk for diabetes or have pre-diabetes Fasting glucose: 81 mg/dL   A1C: No results in last 5 years        Cholesterol Screening Once every 5 years if you don't have a lipid disorder  May order more often based on risk factors  Lipid panel: 04/10/2018          Other Preventive Screenings Covered by Medicare:  1  Abdominal Aortic Aneurysm (AAA) Screening: covered once if your at risk  You're considered to be at risk if you have a family history of AAA  2  Lung Cancer Screening: covers low dose CT scan once per year if you meet all of the following conditions: (1) Age 50-69; (2) No signs or symptoms of lung cancer; (3) Current smoker or have quit smoking within the last 15 years; (4) You have a tobacco smoking history of at least 30 pack years (packs per day multiplied by number of years you smoked); (5) You get a written order from a healthcare provider  3  Glaucoma Screening: covered annually if you're considered high risk: (1) You have diabetes OR (2) Family history of glaucoma OR (3)  aged 48 and older OR (3)  American aged 72 and older  3   Osteoporosis Screening: covered every 2 years if you meet one of the following conditions: (1) You're estrogen deficient and at risk for osteoporosis based off medical history and other findings; (2) Have a vertebral abnormality; (3) On glucocorticoid therapy for more than 3 months; (4) Have primary hyperparathyroidism; (5) On osteoporosis medications and need to assess response to drug therapy  · Last bone density test (DXA Scan): 09/17/2020   5  HIV Screening: covered annually if you're between the age of 15-65  Also covered annually if you are younger than 13 and older than 72 with risk factors for HIV infection  For pregnant patients, it is covered up to 3 times per pregnancy  Immunizations:  Immunization Recommendations   Influenza Vaccine Annual influenza vaccination during flu season is recommended for all persons aged >= 6 months who do not have contraindications   Pneumococcal Vaccine (Prevnar and Pneumovax)  * Prevnar = PCV13  * Pneumovax = PPSV23   Adults 25-60 years old: 1-3 doses may be recommended based on certain risk factors  Adults 72 years old: Prevnar (PCV13) vaccine recommended followed by Pneumovax (PPSV23) vaccine  If already received PPSV23 since turning 65, then PCV13 recommended at least one year after PPSV23 dose  Hepatitis B Vaccine 3 dose series if at intermediate or high risk (ex: diabetes, end stage renal disease, liver disease)   Tetanus (Td) Vaccine - COST NOT COVERED BY MEDICARE PART B Following completion of primary series, a booster dose should be given every 10 years to maintain immunity against tetanus  Td may also be given as tetanus wound prophylaxis  Tdap Vaccine - COST NOT COVERED BY MEDICARE PART B Recommended at least once for all adults  For pregnant patients, recommended with each pregnancy     Shingles Vaccine (Shingrix) - COST NOT COVERED BY MEDICARE PART B  2 shot series recommended in those aged 48 and above     Health Maintenance Due:      Topic Date Due    Colorectal Cancer Screening  Never done    Breast Cancer Screening: Mammogram  07/09/2022    Hepatitis C Screening  Completed     Immunizations Due:      Topic Date Due    COVID-19 Vaccine (3 - Booster for Moderna series) 07/22/2021     Advance Directives   What are advance directives? Advance directives are legal documents that state your wishes and plans for medical care  These plans are made ahead of time in case you lose your ability to make decisions for yourself  Advance directives can apply to any medical decision, such as the treatments you want, and if you want to donate organs  What are the types of advance directives? There are many types of advance directives, and each state has rules about how to use them  You may choose a combination of any of the following:  · Living will: This is a written record of the treatment you want  You can also choose which treatments you do not want, which to limit, and which to stop at a certain time  This includes surgery, medicine, IV fluid, and tube feedings  · Durable power of  for healthcare [facility]): This is a written record that states who you want to make healthcare choices for you when you are unable to make them for yourself  This person, called a proxy, is usually a family member or a friend  You may choose more than 1 proxy  · Do not resuscitate (DNR) order:  A DNR order is used in case your heart stops beating or you stop breathing  It is a request not to have certain forms of treatment, such as CPR  A DNR order may be included in other types of advance directives  · Medical directive: This covers the care that you want if you are in a coma, near death, or unable to make decisions for yourself  You can list the treatments you want for each condition  Treatment may include pain medicine, surgery, blood transfusions, dialysis, IV or tube feedings, and a ventilator (breathing machine)  · Values history: This document has questions about your views, beliefs, and how you feel and think about life  This information can help others choose the care that you would choose  Why are advance directives important? An advance directive helps you control your care  Although spoken wishes may be used, it is better to have your wishes written down  Spoken wishes can be misunderstood, or not followed  Treatments may be given even if you do not want them  An advance directive may make it easier for your family to make difficult choices about your care  Weight Management   Why it is important to manage your weight:  Being overweight increases your risk of health conditions such as heart disease, high blood pressure, type 2 diabetes, and certain types of cancer  It can also increase your risk for osteoarthritis, sleep apnea, and other respiratory problems  Aim for a slow, steady weight loss  Even a small amount of weight loss can lower your risk of health problems  How to lose weight safely:  A safe and healthy way to lose weight is to eat fewer calories and get regular exercise  You can lose up about 1 pound a week by decreasing the number of calories you eat by 500 calories each day  Healthy meal plan for weight management:  A healthy meal plan includes a variety of foods, contains fewer calories, and helps you stay healthy  A healthy meal plan includes the following:  · Eat whole-grain foods more often  A healthy meal plan should contain fiber  Fiber is the part of grains, fruits, and vegetables that is not broken down by your body  Whole-grain foods are healthy and provide extra fiber in your diet  Some examples of whole-grain foods are whole-wheat breads and pastas, oatmeal, brown rice, and bulgur  · Eat a variety of vegetables every day  Include dark, leafy greens such as spinach, kale, shukri greens, and mustard greens  Eat yellow and orange vegetables such as carrots, sweet potatoes, and winter squash  · Eat a variety of fruits every day    Choose fresh or canned fruit (canned in its own juice or light syrup) instead of juice  Fruit juice has very little or no fiber  · Eat low-fat dairy foods  Drink fat-free (skim) milk or 1% milk  Eat fat-free yogurt and low-fat cottage cheese  Try low-fat cheeses such as mozzarella and other reduced-fat cheeses  · Choose meat and other protein foods that are low in fat  Choose beans or other legumes such as split peas or lentils  Choose fish, skinless poultry (chicken or turkey), or lean cuts of red meat (beef or pork)  Before you cook meat or poultry, cut off any visible fat  · Use less fat and oil  Try baking foods instead of frying them  Add less fat, such as margarine, sour cream, regular salad dressing and mayonnaise to foods  Eat fewer high-fat foods  Some examples of high-fat foods include french fries, doughnuts, ice cream, and cakes  · Eat fewer sweets  Limit foods and drinks that are high in sugar  This includes candy, cookies, regular soda, and sweetened drinks  Exercise:  Exercise at least 30 minutes per day on most days of the week  Some examples of exercise include walking, biking, dancing, and swimming  You can also fit in more physical activity by taking the stairs instead of the elevator or parking farther away from stores  Ask your healthcare provider about the best exercise plan for you  © Copyright [employer] 2018 Information is for End User's use only and may not be sold, redistributed or otherwise used for commercial purposes  All illustrations and images included in CareNotes® are the copyrighted property of [employer]  or [facility] Preventive Visit Patient Instructions  Thank you for completing your Welcome to Medicare Visit or Medicare Annual Wellness Visit today  Your next wellness visit will be due in one year (2/15/2023)  The screening/preventive services that you may require over the next 5-10 years are detailed below  Some tests may not apply to you based off risk factors and/or age   Screening tests ordered at today's visit but not completed yet may show as past due  Also, please note that scanned in results may not display below  Preventive Screenings:  Service Recommendations Previous Testing/Comments   Colorectal Cancer Screening  * Colonoscopy    * Fecal Occult Blood Test (FOBT)/Fecal Immunochemical Test (FIT)  * Fecal DNA/Cologuard Test  * Flexible Sigmoidoscopy Age: 54-65 years old   Colonoscopy: every 10 years (may be performed more frequently if at higher risk)  OR  FOBT/FIT: every 1 year  OR  Cologuard: every 3 years  OR  Sigmoidoscopy: every 5 years  Screening may be recommended earlier than age 48 if at higher risk for colorectal cancer  Also, an individualized decision between you and your healthcare provider will decide whether screening between the ages of 74-80 would be appropriate  Colonoscopy: Not on file  FOBT/FIT: Not on file  Cologuard: Not on file  Sigmoidoscopy: Not on file          Breast Cancer Screening Age: 36 years old  Frequency: every 1-2 years  Not required if history of left and right mastectomy Mammogram: 07/09/2021        Cervical Cancer Screening Between the ages of 21-29, pap smear recommended once every 3 years  Between the ages of 33-67, can perform pap smear with HPV co-testing every 5 years  Recommendations may differ for women with a history of total hysterectomy, cervical cancer, or abnormal pap smears in past  Pap Smear: Not on file        Hepatitis C Screening Once for adults born between 1945 and 1965  More frequently in patients at high risk for Hepatitis C Hep C Antibody: 10/28/2019        Diabetes Screening 1-2 times per year if you're at risk for diabetes or have pre-diabetes Fasting glucose: 81 mg/dL   A1C: No results in last 5 years        Cholesterol Screening Once every 5 years if you don't have a lipid disorder  May order more often based on risk factors  Lipid panel: 04/10/2018          Other Preventive Screenings Covered by Medicare:  6   Abdominal Aortic Aneurysm (AAA) Screening: covered once if your at risk  You're considered to be at risk if you have a family history of AAA  7  Lung Cancer Screening: covers low dose CT scan once per year if you meet all of the following conditions: (1) Age 50-69; (2) No signs or symptoms of lung cancer; (3) Current smoker or have quit smoking within the last 15 years; (4) You have a tobacco smoking history of at least 30 pack years (packs per day multiplied by number of years you smoked); (5) You get a written order from a healthcare provider  8  Glaucoma Screening: covered annually if you're considered high risk: (1) You have diabetes OR (2) Family history of glaucoma OR (3)  aged 48 and older OR (3)  American aged 72 and older  5  Osteoporosis Screening: covered every 2 years if you meet one of the following conditions: (1) You're estrogen deficient and at risk for osteoporosis based off medical history and other findings; (2) Have a vertebral abnormality; (3) On glucocorticoid therapy for more than 3 months; (4) Have primary hyperparathyroidism; (5) On osteoporosis medications and need to assess response to drug therapy  · Last bone density test (DXA Scan): 09/17/2020  10  HIV Screening: covered annually if you're between the age of 12-76  Also covered annually if you are younger than 13 and older than 72 with risk factors for HIV infection  For pregnant patients, it is covered up to 3 times per pregnancy  Immunizations:  Immunization Recommendations   Influenza Vaccine Annual influenza vaccination during flu season is recommended for all persons aged >= 6 months who do not have contraindications   Pneumococcal Vaccine (Prevnar and Pneumovax)  * Prevnar = PCV13  * Pneumovax = PPSV23   Adults 25-60 years old: 1-3 doses may be recommended based on certain risk factors  Adults 72 years old: Prevnar (PCV13) vaccine recommended followed by Pneumovax (PPSV23) vaccine   If already received PPSV23 since turning 65, then PCV13 recommended at least one year after PPSV23 dose  Hepatitis B Vaccine 3 dose series if at intermediate or high risk (ex: diabetes, end stage renal disease, liver disease)   Tetanus (Td) Vaccine - COST NOT COVERED BY MEDICARE PART B Following completion of primary series, a booster dose should be given every 10 years to maintain immunity against tetanus  Td may also be given as tetanus wound prophylaxis  Tdap Vaccine - COST NOT COVERED BY MEDICARE PART B Recommended at least once for all adults  For pregnant patients, recommended with each pregnancy  Shingles Vaccine (Shingrix) - COST NOT COVERED BY MEDICARE PART B  2 shot series recommended in those aged 48 and above     Health Maintenance Due:      Topic Date Due    Colorectal Cancer Screening  Never done    Breast Cancer Screening: Mammogram  07/09/2022    Hepatitis C Screening  Completed     Immunizations Due:      Topic Date Due    COVID-19 Vaccine (3 - Booster for Enrike Coil series) 07/22/2021     Advance Directives   What are advance directives? Advance directives are legal documents that state your wishes and plans for medical care  These plans are made ahead of time in case you lose your ability to make decisions for yourself  Advance directives can apply to any medical decision, such as the treatments you want, and if you want to donate organs  What are the types of advance directives? There are many types of advance directives, and each state has rules about how to use them  You may choose a combination of any of the following:  · Living will: This is a written record of the treatment you want  You can also choose which treatments you do not want, which to limit, and which to stop at a certain time  This includes surgery, medicine, IV fluid, and tube feedings  · Durable power of  for healthcare [place] [facility]):   This is a written record that states who you want to make healthcare choices for you when you are unable to make them for yourself  This person, called a proxy, is usually a family member or a friend  You may choose more than 1 proxy  · Do not resuscitate (DNR) order:  A DNR order is used in case your heart stops beating or you stop breathing  It is a request not to have certain forms of treatment, such as CPR  A DNR order may be included in other types of advance directives  · Medical directive: This covers the care that you want if you are in a coma, near death, or unable to make decisions for yourself  You can list the treatments you want for each condition  Treatment may include pain medicine, surgery, blood transfusions, dialysis, IV or tube feedings, and a ventilator (breathing machine)  · Values history: This document has questions about your views, beliefs, and how you feel and think about life  This information can help others choose the care that you would choose  Why are advance directives important? An advance directive helps you control your care  Although spoken wishes may be used, it is better to have your wishes written down  Spoken wishes can be misunderstood, or not followed  Treatments may be given even if you do not want them  An advance directive may make it easier for your family to make difficult choices about your care  Weight Management   Why it is important to manage your weight:  Being overweight increases your risk of health conditions such as heart disease, high blood pressure, type 2 diabetes, and certain types of cancer  It can also increase your risk for osteoarthritis, sleep apnea, and other respiratory problems  Aim for a slow, steady weight loss  Even a small amount of weight loss can lower your risk of health problems  How to lose weight safely:  A safe and healthy way to lose weight is to eat fewer calories and get regular exercise  You can lose up about 1 pound a week by decreasing the number of calories you eat by 500 calories each day     Healthy meal plan for weight management:  A healthy meal plan includes a variety of foods, contains fewer calories, and helps you stay healthy  A healthy meal plan includes the following:  · Eat whole-grain foods more often  A healthy meal plan should contain fiber  Fiber is the part of grains, fruits, and vegetables that is not broken down by your body  Whole-grain foods are healthy and provide extra fiber in your diet  Some examples of whole-grain foods are whole-wheat breads and pastas, oatmeal, brown rice, and bulgur  · Eat a variety of vegetables every day  Include dark, leafy greens such as spinach, kale, shukri greens, and mustard greens  Eat yellow and orange vegetables such as carrots, sweet potatoes, and winter squash  · Eat a variety of fruits every day  Choose fresh or canned fruit (canned in its own juice or light syrup) instead of juice  Fruit juice has very little or no fiber  · Eat low-fat dairy foods  Drink fat-free (skim) milk or 1% milk  Eat fat-free yogurt and low-fat cottage cheese  Try low-fat cheeses such as mozzarella and other reduced-fat cheeses  · Choose meat and other protein foods that are low in fat  Choose beans or other legumes such as split peas or lentils  Choose fish, skinless poultry (chicken or turkey), or lean cuts of red meat (beef or pork)  Before you cook meat or poultry, cut off any visible fat  · Use less fat and oil  Try baking foods instead of frying them  Add less fat, such as margarine, sour cream, regular salad dressing and mayonnaise to foods  Eat fewer high-fat foods  Some examples of high-fat foods include french fries, doughnuts, ice cream, and cakes  · Eat fewer sweets  Limit foods and drinks that are high in sugar  This includes candy, cookies, regular soda, and sweetened drinks  Exercise:  Exercise at least 30 minutes per day on most days of the week  Some examples of exercise include walking, biking, dancing, and swimming   You can also fit in more physical activity by taking the stairs instead of the elevator or parking farther away from stores  Ask your healthcare provider about the best exercise plan for you  © Copyright [employer] 2018 Information is for End User's use only and may not be sold, redistributed or otherwise used for commercial purposes  All illustrations and images included in CareNotes® are the copyrighted property of [employer]  or [facility] Preventive Visit Patient Instructions  Thank you for completing your Welcome to Medicare Visit or Medicare Annual Wellness Visit today  Your next wellness visit will be due in one year (2/15/2023)  The screening/preventive services that you may require over the next 5-10 years are detailed below  Some tests may not apply to you based off risk factors and/or age  Screening tests ordered at today's visit but not completed yet may show as past due  Also, please note that scanned in results may not display below  Preventive Screenings:  Service Recommendations Previous Testing/Comments   Colorectal Cancer Screening  * Colonoscopy    * Fecal Occult Blood Test (FOBT)/Fecal Immunochemical Test (FIT)  * Fecal DNA/Cologuard Test  * Flexible Sigmoidoscopy Age: 54-65 years old   Colonoscopy: every 10 years (may be performed more frequently if at higher risk)  OR  FOBT/FIT: every 1 year  OR  Cologuard: every 3 years  OR  Sigmoidoscopy: every 5 years  Screening may be recommended earlier than age 48 if at higher risk for colorectal cancer  Also, an individualized decision between you and your healthcare provider will decide whether screening between the ages of 74-80 would be appropriate   Colonoscopy: Not on file  FOBT/FIT: Not on file  Cologuard: Not on file  Sigmoidoscopy: Not on file          Breast Cancer Screening Age: 36 years old  Frequency: every 1-2 years  Not required if history of left and right mastectomy Mammogram: 07/09/2021    Screening Current   Cervical Cancer Screening Between the ages of 18-28, pap smear recommended once every 3 years  Between the ages of 33-67, can perform pap smear with HPV co-testing every 5 years  Recommendations may differ for women with a history of total hysterectomy, cervical cancer, or abnormal pap smears in past  Pap Smear: Not on file    Screening Not Indicated   Hepatitis C Screening Once for adults born between 1945 and 1965  More frequently in patients at high risk for Hepatitis C Hep C Antibody: 10/28/2019    Screening Current   Diabetes Screening 1-2 times per year if you're at risk for diabetes or have pre-diabetes Fasting glucose: 81 mg/dL   A1C: No results in last 5 years    Screening Current   Cholesterol Screening Once every 5 years if you don't have a lipid disorder  May order more often based on risk factors  Lipid panel: 04/10/2018    Screening Current     Other Preventive Screenings Covered by Medicare:  11  Abdominal Aortic Aneurysm (AAA) Screening: covered once if your at risk  You're considered to be at risk if you have a family history of AAA  12  Lung Cancer Screening: covers low dose CT scan once per year if you meet all of the following conditions: (1) Age 50-69; (2) No signs or symptoms of lung cancer; (3) Current smoker or have quit smoking within the last 15 years; (4) You have a tobacco smoking history of at least 30 pack years (packs per day multiplied by number of years you smoked); (5) You get a written order from a healthcare provider  15  Glaucoma Screening: covered annually if you're considered high risk: (1) You have diabetes OR (2) Family history of glaucoma OR (3)  aged 48 and older OR (3)  American aged 72 and older  15   Osteoporosis Screening: covered every 2 years if you meet one of the following conditions: (1) You're estrogen deficient and at risk for osteoporosis based off medical history and other findings; (2) Have a vertebral abnormality; (3) On glucocorticoid therapy for more than 3 months; (4) Have primary hyperparathyroidism; (5) On osteoporosis medications and need to assess response to drug therapy  · Last bone density test (DXA Scan): 09/17/2020  15  HIV Screening: covered annually if you're between the age of 12-76  Also covered annually if you are younger than 13 and older than 72 with risk factors for HIV infection  For pregnant patients, it is covered up to 3 times per pregnancy  Immunizations:  Immunization Recommendations   Influenza Vaccine Annual influenza vaccination during flu season is recommended for all persons aged >= 6 months who do not have contraindications   Pneumococcal Vaccine (Prevnar and Pneumovax)  * Prevnar = PCV13  * Pneumovax = PPSV23   Adults 25-60 years old: 1-3 doses may be recommended based on certain risk factors  Adults 72 years old: Prevnar (PCV13) vaccine recommended followed by Pneumovax (PPSV23) vaccine  If already received PPSV23 since turning 65, then PCV13 recommended at least one year after PPSV23 dose  Hepatitis B Vaccine 3 dose series if at intermediate or high risk (ex: diabetes, end stage renal disease, liver disease)   Tetanus (Td) Vaccine - COST NOT COVERED BY MEDICARE PART B Following completion of primary series, a booster dose should be given every 10 years to maintain immunity against tetanus  Td may also be given as tetanus wound prophylaxis  Tdap Vaccine - COST NOT COVERED BY MEDICARE PART B Recommended at least once for all adults  For pregnant patients, recommended with each pregnancy     Shingles Vaccine (Shingrix) - COST NOT COVERED BY MEDICARE PART B  2 shot series recommended in those aged 48 and above     Health Maintenance Due:      Topic Date Due    Colorectal Cancer Screening  Never done    Breast Cancer Screening: Mammogram  07/09/2022    Hepatitis C Screening  Completed     Immunizations Due:      Topic Date Due    COVID-19 Vaccine (3 - Booster for Moderna series) 07/22/2021     Advance Directives   What are advance directives? Advance directives are legal documents that state your wishes and plans for medical care  These plans are made ahead of time in case you lose your ability to make decisions for yourself  Advance directives can apply to any medical decision, such as the treatments you want, and if you want to donate organs  What are the types of advance directives? There are many types of advance directives, and each state has rules about how to use them  You may choose a combination of any of the following:  · Living will: This is a written record of the treatment you want  You can also choose which treatments you do not want, which to limit, and which to stop at a certain time  This includes surgery, medicine, IV fluid, and tube feedings  · Durable power of  for healthcare [facility]): This is a written record that states who you want to make healthcare choices for you when you are unable to make them for yourself  This person, called a proxy, is usually a family member or a friend  You may choose more than 1 proxy  · Do not resuscitate (DNR) order:  A DNR order is used in case your heart stops beating or you stop breathing  It is a request not to have certain forms of treatment, such as CPR  A DNR order may be included in other types of advance directives  · Medical directive: This covers the care that you want if you are in a coma, near death, or unable to make decisions for yourself  You can list the treatments you want for each condition  Treatment may include pain medicine, surgery, blood transfusions, dialysis, IV or tube feedings, and a ventilator (breathing machine)  · Values history: This document has questions about your views, beliefs, and how you feel and think about life  This information can help others choose the care that you would choose  Why are advance directives important? An advance directive helps you control your care   Although spoken wishes may be used, it is better to have your wishes written down  Spoken wishes can be misunderstood, or not followed  Treatments may be given even if you do not want them  An advance directive may make it easier for your family to make difficult choices about your care  Fall Prevention    Fall prevention  includes ways to make your home and other areas safer  It also includes ways you can move more carefully to prevent a fall  Health conditions that cause changes in your blood pressure, vision, or muscle strength and coordination may increase your risk for falls  Medicines may also increase your risk for falls if they make you dizzy, weak, or sleepy  Fall prevention tips:   · Stand or sit up slowly  · Use assistive devices as directed  · Wear shoes that fit well and have soles that   · Wear a personal alarm  · Stay active  · Manage your medical conditions  Home Safety Tips:  · Add items to prevent falls in the bathroom  · Keep paths clear  · Install bright lights in your home  · Keep items you use often on shelves within reach  · Paint or place reflective tape on the edges of your stairs  Weight Management   Why it is important to manage your weight:  Being overweight increases your risk of health conditions such as heart disease, high blood pressure, type 2 diabetes, and certain types of cancer  It can also increase your risk for osteoarthritis, sleep apnea, and other respiratory problems  Aim for a slow, steady weight loss  Even a small amount of weight loss can lower your risk of health problems  How to lose weight safely:  A safe and healthy way to lose weight is to eat fewer calories and get regular exercise  You can lose up about 1 pound a week by decreasing the number of calories you eat by 500 calories each day  Healthy meal plan for weight management:  A healthy meal plan includes a variety of foods, contains fewer calories, and helps you stay healthy   A healthy meal plan includes the following:  · Eat whole-grain foods more often  A healthy meal plan should contain fiber  Fiber is the part of grains, fruits, and vegetables that is not broken down by your body  Whole-grain foods are healthy and provide extra fiber in your diet  Some examples of whole-grain foods are whole-wheat breads and pastas, oatmeal, brown rice, and bulgur  · Eat a variety of vegetables every day  Include dark, leafy greens such as spinach, kale, shukri greens, and mustard greens  Eat yellow and orange vegetables such as carrots, sweet potatoes, and winter squash  · Eat a variety of fruits every day  Choose fresh or canned fruit (canned in its own juice or light syrup) instead of juice  Fruit juice has very little or no fiber  · Eat low-fat dairy foods  Drink fat-free (skim) milk or 1% milk  Eat fat-free yogurt and low-fat cottage cheese  Try low-fat cheeses such as mozzarella and other reduced-fat cheeses  · Choose meat and other protein foods that are low in fat  Choose beans or other legumes such as split peas or lentils  Choose fish, skinless poultry (chicken or turkey), or lean cuts of red meat (beef or pork)  Before you cook meat or poultry, cut off any visible fat  · Use less fat and oil  Try baking foods instead of frying them  Add less fat, such as margarine, sour cream, regular salad dressing and mayonnaise to foods  Eat fewer high-fat foods  Some examples of high-fat foods include french fries, doughnuts, ice cream, and cakes  · Eat fewer sweets  Limit foods and drinks that are high in sugar  This includes candy, cookies, regular soda, and sweetened drinks  Exercise:  Exercise at least 30 minutes per day on most days of the week  Some examples of exercise include walking, biking, dancing, and swimming  You can also fit in more physical activity by taking the stairs instead of the elevator or parking farther away from stores  Ask your healthcare provider about the best exercise plan for you        © Copyright [employer] 2018 Information is for [employer] use only and may not be sold, redistributed or otherwise used for commercial purposes   All illustrations and images included in CareNotes® are the copyrighted property of [employer]  or [address] [place], [place] [address]carla ben

## 2022-02-14 NOTE — PATIENT INSTRUCTIONS
Medicare wellness exam is completed  Blood pressure is adequately controlled  Blood work was done in July revealing her usual low sodium which is well tolerated  Some discussion about her knee  Suggest seeing the orthopedic surgeon again to see what his suggestions might be since her activities are limited because of discomfort  Continue spironolactone for hair loss and suggest trying over-the-counter minoxidil although it needs to be used indefinitely  For itchy scalp, trial of Kenalog spray  At this point, she can blow hard to get the clot out of her not left nasal passage  Continue Fosamax once weekly for osteoporosis  Planning to get her COVID booster  Would like to ignore the colon cancer screening suggestion  Suggest doing in any way  Recheck in 4 months  Medicare Preventive Visit Patient Instructions  Thank you for completing your Welcome to Medicare Visit or Medicare Annual Wellness Visit today  Your next wellness visit will be due in one year (2/15/2023)  The screening/preventive services that you may require over the next 5-10 years are detailed below  Some tests may not apply to you based off risk factors and/or age  Screening tests ordered at today's visit but not completed yet may show as past due  Also, please note that scanned in results may not display below  Preventive Screenings:  Service Recommendations Previous Testing/Comments   Colorectal Cancer Screening  * Colonoscopy    * Fecal Occult Blood Test (FOBT)/Fecal Immunochemical Test (FIT)  * Fecal DNA/Cologuard Test  * Flexible Sigmoidoscopy Age: 54-65 years old   Colonoscopy: every 10 years (may be performed more frequently if at higher risk)  OR  FOBT/FIT: every 1 year  OR  Cologuard: every 3 years  OR  Sigmoidoscopy: every 5 years  Screening may be recommended earlier than age 48 if at higher risk for colorectal cancer   Also, an individualized decision between you and your healthcare provider will decide whether screening between the ages of 74-80 would be appropriate  Colonoscopy: Not on file  FOBT/FIT: Not on file  Cologuard: Not on file  Sigmoidoscopy: Not on file          Breast Cancer Screening Age: 36 years old  Frequency: every 1-2 years  Not required if history of left and right mastectomy Mammogram: 07/09/2021        Cervical Cancer Screening Between the ages of 21-29, pap smear recommended once every 3 years  Between the ages of 33-67, can perform pap smear with HPV co-testing every 5 years  Recommendations may differ for women with a history of total hysterectomy, cervical cancer, or abnormal pap smears in past  Pap Smear: Not on file        Hepatitis C Screening Once for adults born between 1945 and 1965  More frequently in patients at high risk for Hepatitis C Hep C Antibody: 10/28/2019        Diabetes Screening 1-2 times per year if you're at risk for diabetes or have pre-diabetes Fasting glucose: 81 mg/dL   A1C: No results in last 5 years        Cholesterol Screening Once every 5 years if you don't have a lipid disorder  May order more often based on risk factors  Lipid panel: 04/10/2018          Other Preventive Screenings Covered by Medicare:  1  Abdominal Aortic Aneurysm (AAA) Screening: covered once if your at risk  You're considered to be at risk if you have a family history of AAA  2  Lung Cancer Screening: covers low dose CT scan once per year if you meet all of the following conditions: (1) Age 50-69; (2) No signs or symptoms of lung cancer; (3) Current smoker or have quit smoking within the last 15 years; (4) You have a tobacco smoking history of at least 30 pack years (packs per day multiplied by number of years you smoked); (5) You get a written order from a healthcare provider    3  Glaucoma Screening: covered annually if you're considered high risk: (1) You have diabetes OR (2) Family history of glaucoma OR (3)  aged 48 and older OR (3)  American aged 72 and older  4  Osteoporosis Screening: covered every 2 years if you meet one of the following conditions: (1) You're estrogen deficient and at risk for osteoporosis based off medical history and other findings; (2) Have a vertebral abnormality; (3) On glucocorticoid therapy for more than 3 months; (4) Have primary hyperparathyroidism; (5) On osteoporosis medications and need to assess response to drug therapy  · Last bone density test (DXA Scan): 09/17/2020   5  HIV Screening: covered annually if you're between the age of 15-65  Also covered annually if you are younger than 13 and older than 72 with risk factors for HIV infection  For pregnant patients, it is covered up to 3 times per pregnancy  Immunizations:  Immunization Recommendations   Influenza Vaccine Annual influenza vaccination during flu season is recommended for all persons aged >= 6 months who do not have contraindications   Pneumococcal Vaccine (Prevnar and Pneumovax)  * Prevnar = PCV13  * Pneumovax = PPSV23   Adults 25-60 years old: 1-3 doses may be recommended based on certain risk factors  Adults 72 years old: Prevnar (PCV13) vaccine recommended followed by Pneumovax (PPSV23) vaccine  If already received PPSV23 since turning 65, then PCV13 recommended at least one year after PPSV23 dose  Hepatitis B Vaccine 3 dose series if at intermediate or high risk (ex: diabetes, end stage renal disease, liver disease)   Tetanus (Td) Vaccine - COST NOT COVERED BY MEDICARE PART B Following completion of primary series, a booster dose should be given every 10 years to maintain immunity against tetanus  Td may also be given as tetanus wound prophylaxis  Tdap Vaccine - COST NOT COVERED BY MEDICARE PART B Recommended at least once for all adults  For pregnant patients, recommended with each pregnancy     Shingles Vaccine (Shingrix) - COST NOT COVERED BY MEDICARE PART B  2 shot series recommended in those aged 48 and above     Health Maintenance Due:      Topic Date Due    Colorectal Cancer Screening  Never done    Breast Cancer Screening: Mammogram  07/09/2022    Hepatitis C Screening  Completed     Immunizations Due:      Topic Date Due    COVID-19 Vaccine (3 - Booster for [place] Line series) 07/22/2021     Advance Directives   What are advance directives? Advance directives are legal documents that state your wishes and plans for medical care  These plans are made ahead of time in case you lose your ability to make decisions for yourself  Advance directives can apply to any medical decision, such as the treatments you want, and if you want to donate organs  What are the types of advance directives? There are many types of advance directives, and each state has rules about how to use them  You may choose a combination of any of the following:  · Living will: This is a written record of the treatment you want  You can also choose which treatments you do not want, which to limit, and which to stop at a certain time  This includes surgery, medicine, IV fluid, and tube feedings  · Durable power of  for healthcare [facility]): This is a written record that states who you want to make healthcare choices for you when you are unable to make them for yourself  This person, called a proxy, is usually a family member or a friend  You may choose more than 1 proxy  · Do not resuscitate (DNR) order:  A DNR order is used in case your heart stops beating or you stop breathing  It is a request not to have certain forms of treatment, such as CPR  A DNR order may be included in other types of advance directives  · Medical directive: This covers the care that you want if you are in a coma, near death, or unable to make decisions for yourself  You can list the treatments you want for each condition  Treatment may include pain medicine, surgery, blood transfusions, dialysis, IV or tube feedings, and a ventilator (breathing machine)  · Values history:   This document has questions about your views, beliefs, and how you feel and think about life  This information can help others choose the care that you would choose  Why are advance directives important? An advance directive helps you control your care  Although spoken wishes may be used, it is better to have your wishes written down  Spoken wishes can be misunderstood, or not followed  Treatments may be given even if you do not want them  An advance directive may make it easier for your family to make difficult choices about your care  Weight Management   Why it is important to manage your weight:  Being overweight increases your risk of health conditions such as heart disease, high blood pressure, type 2 diabetes, and certain types of cancer  It can also increase your risk for osteoarthritis, sleep apnea, and other respiratory problems  Aim for a slow, steady weight loss  Even a small amount of weight loss can lower your risk of health problems  How to lose weight safely:  A safe and healthy way to lose weight is to eat fewer calories and get regular exercise  You can lose up about 1 pound a week by decreasing the number of calories you eat by 500 calories each day  Healthy meal plan for weight management:  A healthy meal plan includes a variety of foods, contains fewer calories, and helps you stay healthy  A healthy meal plan includes the following:  · Eat whole-grain foods more often  A healthy meal plan should contain fiber  Fiber is the part of grains, fruits, and vegetables that is not broken down by your body  Whole-grain foods are healthy and provide extra fiber in your diet  Some examples of whole-grain foods are whole-wheat breads and pastas, oatmeal, brown rice, and bulgur  · Eat a variety of vegetables every day  Include dark, leafy greens such as spinach, kale, shukri greens, and mustard greens  Eat yellow and orange vegetables such as carrots, sweet potatoes, and winter squash     · Eat a variety of fruits every day  Choose fresh or canned fruit (canned in its own juice or light syrup) instead of juice  Fruit juice has very little or no fiber  · Eat low-fat dairy foods  Drink fat-free (skim) milk or 1% milk  Eat fat-free yogurt and low-fat cottage cheese  Try low-fat cheeses such as mozzarella and other reduced-fat cheeses  · Choose meat and other protein foods that are low in fat  Choose beans or other legumes such as split peas or lentils  Choose fish, skinless poultry (chicken or turkey), or lean cuts of red meat (beef or pork)  Before you cook meat or poultry, cut off any visible fat  · Use less fat and oil  Try baking foods instead of frying them  Add less fat, such as margarine, sour cream, regular salad dressing and mayonnaise to foods  Eat fewer high-fat foods  Some examples of high-fat foods include french fries, doughnuts, ice cream, and cakes  · Eat fewer sweets  Limit foods and drinks that are high in sugar  This includes candy, cookies, regular soda, and sweetened drinks  Exercise:  Exercise at least 30 minutes per day on most days of the week  Some examples of exercise include walking, biking, dancing, and swimming  You can also fit in more physical activity by taking the stairs instead of the elevator or parking farther away from stores  Ask your healthcare provider about the best exercise plan for you  © Copyright [employer] 2018 Information is for End User's use only and may not be sold, redistributed or otherwise used for commercial purposes  All illustrations and images included in CareNotes® are the copyrighted property of [employer]  or [facility] Preventive Visit Patient Instructions  Thank you for completing your Welcome to Medicare Visit or Medicare Annual Wellness Visit today  Your next wellness visit will be due in one year (2/15/2023)  The screening/preventive services that you may require over the next 5-10 years are detailed below   Some tests may not apply to you based off risk factors and/or age  Screening tests ordered at today's visit but not completed yet may show as past due  Also, please note that scanned in results may not display below  Preventive Screenings:  Service Recommendations Previous Testing/Comments   Colorectal Cancer Screening  * Colonoscopy    * Fecal Occult Blood Test (FOBT)/Fecal Immunochemical Test (FIT)  * Fecal DNA/Cologuard Test  * Flexible Sigmoidoscopy Age: 54-65 years old   Colonoscopy: every 10 years (may be performed more frequently if at higher risk)  OR  FOBT/FIT: every 1 year  OR  Cologuard: every 3 years  OR  Sigmoidoscopy: every 5 years  Screening may be recommended earlier than age 48 if at higher risk for colorectal cancer  Also, an individualized decision between you and your healthcare provider will decide whether screening between the ages of 74-80 would be appropriate  Colonoscopy: Not on file  FOBT/FIT: Not on file  Cologuard: Not on file  Sigmoidoscopy: Not on file          Breast Cancer Screening Age: 36 years old  Frequency: every 1-2 years  Not required if history of left and right mastectomy Mammogram: 07/09/2021        Cervical Cancer Screening Between the ages of 21-29, pap smear recommended once every 3 years  Between the ages of 33-67, can perform pap smear with HPV co-testing every 5 years  Recommendations may differ for women with a history of total hysterectomy, cervical cancer, or abnormal pap smears in past  Pap Smear: Not on file        Hepatitis C Screening Once for adults born between 1945 and 1965  More frequently in patients at high risk for Hepatitis C Hep C Antibody: 10/28/2019        Diabetes Screening 1-2 times per year if you're at risk for diabetes or have pre-diabetes Fasting glucose: 81 mg/dL   A1C: No results in last 5 years        Cholesterol Screening Once every 5 years if you don't have a lipid disorder  May order more often based on risk factors   Lipid panel: 04/10/2018 Other Preventive Screenings Covered by Medicare:  6  Abdominal Aortic Aneurysm (AAA) Screening: covered once if your at risk  You're considered to be at risk if you have a family history of AAA  7  Lung Cancer Screening: covers low dose CT scan once per year if you meet all of the following conditions: (1) Age 50-69; (2) No signs or symptoms of lung cancer; (3) Current smoker or have quit smoking within the last 15 years; (4) You have a tobacco smoking history of at least 30 pack years (packs per day multiplied by number of years you smoked); (5) You get a written order from a healthcare provider  8  Glaucoma Screening: covered annually if you're considered high risk: (1) You have diabetes OR (2) Family history of glaucoma OR (3)  aged 48 and older OR (3)  American aged 72 and older  5  Osteoporosis Screening: covered every 2 years if you meet one of the following conditions: (1) You're estrogen deficient and at risk for osteoporosis based off medical history and other findings; (2) Have a vertebral abnormality; (3) On glucocorticoid therapy for more than 3 months; (4) Have primary hyperparathyroidism; (5) On osteoporosis medications and need to assess response to drug therapy  · Last bone density test (DXA Scan): 09/17/2020  10  HIV Screening: covered annually if you're between the age of 12-76  Also covered annually if you are younger than 13 and older than 72 with risk factors for HIV infection  For pregnant patients, it is covered up to 3 times per pregnancy      Immunizations:  Immunization Recommendations   Influenza Vaccine Annual influenza vaccination during flu season is recommended for all persons aged >= 6 months who do not have contraindications   Pneumococcal Vaccine (Prevnar and Pneumovax)  * Prevnar = PCV13  * Pneumovax = PPSV23   Adults 25-60 years old: 1-3 doses may be recommended based on certain risk factors  Adults 72 years old: Prevnar (PCV13) vaccine recommended followed by Pneumovax (PPSV23) vaccine  If already received PPSV23 since turning 65, then PCV13 recommended at least one year after PPSV23 dose  Hepatitis B Vaccine 3 dose series if at intermediate or high risk (ex: diabetes, end stage renal disease, liver disease)   Tetanus (Td) Vaccine - COST NOT COVERED BY MEDICARE PART B Following completion of primary series, a booster dose should be given every 10 years to maintain immunity against tetanus  Td may also be given as tetanus wound prophylaxis  Tdap Vaccine - COST NOT COVERED BY MEDICARE PART B Recommended at least once for all adults  For pregnant patients, recommended with each pregnancy  Shingles Vaccine (Shingrix) - COST NOT COVERED BY MEDICARE PART B  2 shot series recommended in those aged 48 and above     Health Maintenance Due:      Topic Date Due    Colorectal Cancer Screening  Never done    Breast Cancer Screening: Mammogram  07/09/2022    Hepatitis C Screening  Completed     Immunizations Due:      Topic Date Due    COVID-19 Vaccine (3 - Booster for Gary Naegeli series) 07/22/2021     Advance Directives   What are advance directives? Advance directives are legal documents that state your wishes and plans for medical care  These plans are made ahead of time in case you lose your ability to make decisions for yourself  Advance directives can apply to any medical decision, such as the treatments you want, and if you want to donate organs  What are the types of advance directives? There are many types of advance directives, and each state has rules about how to use them  You may choose a combination of any of the following:  · Living will: This is a written record of the treatment you want  You can also choose which treatments you do not want, which to limit, and which to stop at a certain time  This includes surgery, medicine, IV fluid, and tube feedings  · Durable power of  for healthcare [facility]):   This is a written record that states who you want to make healthcare choices for you when you are unable to make them for yourself  This person, called a proxy, is usually a family member or a friend  You may choose more than 1 proxy  · Do not resuscitate (DNR) order:  A DNR order is used in case your heart stops beating or you stop breathing  It is a request not to have certain forms of treatment, such as CPR  A DNR order may be included in other types of advance directives  · Medical directive: This covers the care that you want if you are in a coma, near death, or unable to make decisions for yourself  You can list the treatments you want for each condition  Treatment may include pain medicine, surgery, blood transfusions, dialysis, IV or tube feedings, and a ventilator (breathing machine)  · Values history: This document has questions about your views, beliefs, and how you feel and think about life  This information can help others choose the care that you would choose  Why are advance directives important? An advance directive helps you control your care  Although spoken wishes may be used, it is better to have your wishes written down  Spoken wishes can be misunderstood, or not followed  Treatments may be given even if you do not want them  An advance directive may make it easier for your family to make difficult choices about your care  Weight Management   Why it is important to manage your weight:  Being overweight increases your risk of health conditions such as heart disease, high blood pressure, type 2 diabetes, and certain types of cancer  It can also increase your risk for osteoarthritis, sleep apnea, and other respiratory problems  Aim for a slow, steady weight loss  Even a small amount of weight loss can lower your risk of health problems  How to lose weight safely:  A safe and healthy way to lose weight is to eat fewer calories and get regular exercise   You can lose up about 1 pound a week by decreasing the number of calories you eat by 500 calories each day  Healthy meal plan for weight management:  A healthy meal plan includes a variety of foods, contains fewer calories, and helps you stay healthy  A healthy meal plan includes the following:  · Eat whole-grain foods more often  A healthy meal plan should contain fiber  Fiber is the part of grains, fruits, and vegetables that is not broken down by your body  Whole-grain foods are healthy and provide extra fiber in your diet  Some examples of whole-grain foods are whole-wheat breads and pastas, oatmeal, brown rice, and bulgur  · Eat a variety of vegetables every day  Include dark, leafy greens such as spinach, kale, shukri greens, and mustard greens  Eat yellow and orange vegetables such as carrots, sweet potatoes, and winter squash  · Eat a variety of fruits every day  Choose fresh or canned fruit (canned in its own juice or light syrup) instead of juice  Fruit juice has very little or no fiber  · Eat low-fat dairy foods  Drink fat-free (skim) milk or 1% milk  Eat fat-free yogurt and low-fat cottage cheese  Try low-fat cheeses such as mozzarella and other reduced-fat cheeses  · Choose meat and other protein foods that are low in fat  Choose beans or other legumes such as split peas or lentils  Choose fish, skinless poultry (chicken or turkey), or lean cuts of red meat (beef or pork)  Before you cook meat or poultry, cut off any visible fat  · Use less fat and oil  Try baking foods instead of frying them  Add less fat, such as margarine, sour cream, regular salad dressing and mayonnaise to foods  Eat fewer high-fat foods  Some examples of high-fat foods include french fries, doughnuts, ice cream, and cakes  · Eat fewer sweets  Limit foods and drinks that are high in sugar  This includes candy, cookies, regular soda, and sweetened drinks  Exercise:  Exercise at least 30 minutes per day on most days of the week   Some examples of exercise include walking, biking, dancing, and swimming  You can also fit in more physical activity by taking the stairs instead of the elevator or parking farther away from stores  Ask your healthcare provider about the best exercise plan for you  © Copyright [employer] 2018 Information is for End User's use only and may not be sold, redistributed or otherwise used for commercial purposes  All illustrations and images included in CareNotes® are the copyrighted property of [employer]  or [facility] Preventive Visit Patient Instructions  Thank you for completing your Welcome to Medicare Visit or Medicare Annual Wellness Visit today  Your next wellness visit will be due in one year (2/15/2023)  The screening/preventive services that you may require over the next 5-10 years are detailed below  Some tests may not apply to you based off risk factors and/or age  Screening tests ordered at today's visit but not completed yet may show as past due  Also, please note that scanned in results may not display below  Preventive Screenings:  Service Recommendations Previous Testing/Comments   Colorectal Cancer Screening  * Colonoscopy    * Fecal Occult Blood Test (FOBT)/Fecal Immunochemical Test (FIT)  * Fecal DNA/Cologuard Test  * Flexible Sigmoidoscopy Age: 54-65 years old   Colonoscopy: every 10 years (may be performed more frequently if at higher risk)  OR  FOBT/FIT: every 1 year  OR  Cologuard: every 3 years  OR  Sigmoidoscopy: every 5 years  Screening may be recommended earlier than age 48 if at higher risk for colorectal cancer  Also, an individualized decision between you and your healthcare provider will decide whether screening between the ages of 74-80 would be appropriate   Colonoscopy: Not on file  FOBT/FIT: Not on file  Cologuard: Not on file  Sigmoidoscopy: Not on file          Breast Cancer Screening Age: 36 years old  Frequency: every 1-2 years  Not required if history of left and right mastectomy Mammogram: 07/09/2021    Screening Current   Cervical Cancer Screening Between the ages of 18-28, pap smear recommended once every 3 years  Between the ages of 33-67, can perform pap smear with HPV co-testing every 5 years  Recommendations may differ for women with a history of total hysterectomy, cervical cancer, or abnormal pap smears in past  Pap Smear: Not on file    Screening Not Indicated   Hepatitis C Screening Once for adults born between 1945 and 1965  More frequently in patients at high risk for Hepatitis C Hep C Antibody: 10/28/2019    Screening Current   Diabetes Screening 1-2 times per year if you're at risk for diabetes or have pre-diabetes Fasting glucose: 81 mg/dL   A1C: No results in last 5 years    Screening Current   Cholesterol Screening Once every 5 years if you don't have a lipid disorder  May order more often based on risk factors  Lipid panel: 04/10/2018    Screening Current     Other Preventive Screenings Covered by Medicare:  11  Abdominal Aortic Aneurysm (AAA) Screening: covered once if your at risk  You're considered to be at risk if you have a family history of AAA  12  Lung Cancer Screening: covers low dose CT scan once per year if you meet all of the following conditions: (1) Age 50-69; (2) No signs or symptoms of lung cancer; (3) Current smoker or have quit smoking within the last 15 years; (4) You have a tobacco smoking history of at least 30 pack years (packs per day multiplied by number of years you smoked); (5) You get a written order from a healthcare provider  15  Glaucoma Screening: covered annually if you're considered high risk: (1) You have diabetes OR (2) Family history of glaucoma OR (3)  aged 48 and older OR (3)  American aged 72 and older  15   Osteoporosis Screening: covered every 2 years if you meet one of the following conditions: (1) You're estrogen deficient and at risk for osteoporosis based off medical history and other findings; (2) Have a vertebral abnormality; (3) On glucocorticoid therapy for more than 3 months; (4) Have primary hyperparathyroidism; (5) On osteoporosis medications and need to assess response to drug therapy  · Last bone density test (DXA Scan): 09/17/2020  15  HIV Screening: covered annually if you're between the age of 12-76  Also covered annually if you are younger than 13 and older than 72 with risk factors for HIV infection  For pregnant patients, it is covered up to 3 times per pregnancy  Immunizations:  Immunization Recommendations   Influenza Vaccine Annual influenza vaccination during flu season is recommended for all persons aged >= 6 months who do not have contraindications   Pneumococcal Vaccine (Prevnar and Pneumovax)  * Prevnar = PCV13  * Pneumovax = PPSV23   Adults 25-60 years old: 1-3 doses may be recommended based on certain risk factors  Adults 72 years old: Prevnar (PCV13) vaccine recommended followed by Pneumovax (PPSV23) vaccine  If already received PPSV23 since turning 65, then PCV13 recommended at least one year after PPSV23 dose  Hepatitis B Vaccine 3 dose series if at intermediate or high risk (ex: diabetes, end stage renal disease, liver disease)   Tetanus (Td) Vaccine - COST NOT COVERED BY MEDICARE PART B Following completion of primary series, a booster dose should be given every 10 years to maintain immunity against tetanus  Td may also be given as tetanus wound prophylaxis  Tdap Vaccine - COST NOT COVERED BY MEDICARE PART B Recommended at least once for all adults  For pregnant patients, recommended with each pregnancy     Shingles Vaccine (Shingrix) - COST NOT COVERED BY MEDICARE PART B  2 shot series recommended in those aged 48 and above     Health Maintenance Due:      Topic Date Due    Colorectal Cancer Screening  Never done    Breast Cancer Screening: Mammogram  07/09/2022    Hepatitis C Screening  Completed     Immunizations Due:      Topic Date Due    COVID-19 Vaccine (3 - Booster for Moderna series) 07/22/2021     Advance Directives   What are advance directives? Advance directives are legal documents that state your wishes and plans for medical care  These plans are made ahead of time in case you lose your ability to make decisions for yourself  Advance directives can apply to any medical decision, such as the treatments you want, and if you want to donate organs  What are the types of advance directives? There are many types of advance directives, and each state has rules about how to use them  You may choose a combination of any of the following:  · Living will: This is a written record of the treatment you want  You can also choose which treatments you do not want, which to limit, and which to stop at a certain time  This includes surgery, medicine, IV fluid, and tube feedings  · Durable power of  for healthcare [facility]): This is a written record that states who you want to make healthcare choices for you when you are unable to make them for yourself  This person, called a proxy, is usually a family member or a friend  You may choose more than 1 proxy  · Do not resuscitate (DNR) order:  A DNR order is used in case your heart stops beating or you stop breathing  It is a request not to have certain forms of treatment, such as CPR  A DNR order may be included in other types of advance directives  · Medical directive: This covers the care that you want if you are in a coma, near death, or unable to make decisions for yourself  You can list the treatments you want for each condition  Treatment may include pain medicine, surgery, blood transfusions, dialysis, IV or tube feedings, and a ventilator (breathing machine)  · Values history: This document has questions about your views, beliefs, and how you feel and think about life  This information can help others choose the care that you would choose  Why are advance directives important?   An advance directive helps you control your care  Although spoken wishes may be used, it is better to have your wishes written down  Spoken wishes can be misunderstood, or not followed  Treatments may be given even if you do not want them  An advance directive may make it easier for your family to make difficult choices about your care  Fall Prevention    Fall prevention  includes ways to make your home and other areas safer  It also includes ways you can move more carefully to prevent a fall  Health conditions that cause changes in your blood pressure, vision, or muscle strength and coordination may increase your risk for falls  Medicines may also increase your risk for falls if they make you dizzy, weak, or sleepy  Fall prevention tips:   · Stand or sit up slowly  · Use assistive devices as directed  · Wear shoes that fit well and have soles that   · Wear a personal alarm  · Stay active  · Manage your medical conditions  Home Safety Tips:  · Add items to prevent falls in the bathroom  · Keep paths clear  · Install bright lights in your home  · Keep items you use often on shelves within reach  · Paint or place reflective tape on the edges of your stairs  Weight Management   Why it is important to manage your weight:  Being overweight increases your risk of health conditions such as heart disease, high blood pressure, type 2 diabetes, and certain types of cancer  It can also increase your risk for osteoarthritis, sleep apnea, and other respiratory problems  Aim for a slow, steady weight loss  Even a small amount of weight loss can lower your risk of health problems  How to lose weight safely:  A safe and healthy way to lose weight is to eat fewer calories and get regular exercise  You can lose up about 1 pound a week by decreasing the number of calories you eat by 500 calories each day     Healthy meal plan for weight management:  A healthy meal plan includes a variety of foods, contains fewer calories, and helps you stay healthy  A healthy meal plan includes the following:  · Eat whole-grain foods more often  A healthy meal plan should contain fiber  Fiber is the part of grains, fruits, and vegetables that is not broken down by your body  Whole-grain foods are healthy and provide extra fiber in your diet  Some examples of whole-grain foods are whole-wheat breads and pastas, oatmeal, brown rice, and bulgur  · Eat a variety of vegetables every day  Include dark, leafy greens such as spinach, kale, shukri greens, and mustard greens  Eat yellow and orange vegetables such as carrots, sweet potatoes, and winter squash  · Eat a variety of fruits every day  Choose fresh or canned fruit (canned in its own juice or light syrup) instead of juice  Fruit juice has very little or no fiber  · Eat low-fat dairy foods  Drink fat-free (skim) milk or 1% milk  Eat fat-free yogurt and low-fat cottage cheese  Try low-fat cheeses such as mozzarella and other reduced-fat cheeses  · Choose meat and other protein foods that are low in fat  Choose beans or other legumes such as split peas or lentils  Choose fish, skinless poultry (chicken or turkey), or lean cuts of red meat (beef or pork)  Before you cook meat or poultry, cut off any visible fat  · Use less fat and oil  Try baking foods instead of frying them  Add less fat, such as margarine, sour cream, regular salad dressing and mayonnaise to foods  Eat fewer high-fat foods  Some examples of high-fat foods include french fries, doughnuts, ice cream, and cakes  · Eat fewer sweets  Limit foods and drinks that are high in sugar  This includes candy, cookies, regular soda, and sweetened drinks  Exercise:  Exercise at least 30 minutes per day on most days of the week  Some examples of exercise include walking, biking, dancing, and swimming  You can also fit in more physical activity by taking the stairs instead of the elevator or parking farther away from stores   Ask your healthcare provider about the best exercise plan for you  © Copyright [employer] 2018 Information is for End User's use only and may not be sold, redistributed or otherwise used for commercial purposes   All illustrations and images included in CareNotes® are the copyrighted property of A D A M , Inc  or [address] [place] [address] [email or web address]

## 2022-03-09 ENCOUNTER — OFFICE VISIT (OUTPATIENT)
Dept: FAMILY MEDICINE CLINIC | Facility: CLINIC | Age: 67
End: 2022-03-09
Payer: MEDICARE

## 2022-03-09 VITALS
SYSTOLIC BLOOD PRESSURE: 126 MMHG | HEART RATE: 65 BPM | BODY MASS INDEX: 30.08 KG/M2 | DIASTOLIC BLOOD PRESSURE: 79 MMHG | TEMPERATURE: 96.8 F | OXYGEN SATURATION: 98 % | HEIGHT: 64 IN | WEIGHT: 176.2 LBS

## 2022-03-09 DIAGNOSIS — I10 BENIGN ESSENTIAL HYPERTENSION: ICD-10-CM

## 2022-03-09 DIAGNOSIS — J45.20 MILD INTERMITTENT ASTHMA WITHOUT COMPLICATION: ICD-10-CM

## 2022-03-09 DIAGNOSIS — F33.1 MODERATE EPISODE OF RECURRENT MAJOR DEPRESSIVE DISORDER (HCC): ICD-10-CM

## 2022-03-09 DIAGNOSIS — Z01.818 PREOPERATIVE EXAMINATION: Primary | ICD-10-CM

## 2022-03-09 DIAGNOSIS — M17.11 PRIMARY OSTEOARTHRITIS OF RIGHT KNEE: ICD-10-CM

## 2022-03-09 PROCEDURE — 99214 OFFICE O/P EST MOD 30 MIN: CPT | Performed by: FAMILY MEDICINE

## 2022-03-09 NOTE — PROGRESS NOTES
Chief Complaint   Patient presents with    Pre-op Exam     R knee 3/28/22        HPI   Here for preoperative exam at the request of Dr Leopold Barker  She is scheduled to undergo a right total knee replacement at [facility] on March 28th  This is being done for continued pain over the last 2 years despite conservative therapy and cortisone/viscosupplementation injections  Past medical history as noted below significant for hypertension which has been controlled  She usually has a mild hyponatremia associated with it  She is being treated for depression with sertraline which she tried stopping recently-unsuccessfully  Was treated for temporal arteritis for about a year on prednisone which has been stopped  Biopsies were negative but her symptoms were consistent with the diagnosis  Has developed osteoporosis for which she takes alendronate 70 mg once weekly  Medications are noted below  No allergies to medications  Nonsmoker  One or 2 glasses of wine a day  Had been drinking more but was able to cut back on her own about 1 year ago          Past Medical History:   Diagnosis Date    Age-related osteoporosis without current pathological fracture 02/14/2022    Anxiety state 12/19/2014    Benign essential hypertension 01/14/2010    Chronic rhinitis 1/12/2012    Depression 2019    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 hair loss 2/4/2020    Fracture of multiple ribs of left side 12/21/2020    Hyponatremia 10/02/2020    Mild persistent asthma without complication 0/0/3281    Right chronic serous otitis media 11/07/2017    Resolved after myringotomy tube    Scapula fracture 12/21/2020    Temporal arteritis ([address] ) 09/04/2020    Biopsy negative-treated with prednisone for 1 year        Past Surgical History:   Procedure Laterality Date    LAPAROSCOPY      X2 for endometriosis    CO TEMPORAL ARTERY LIGATN OR BX Left 8/14/2020    Procedure: TEMPORAL ARTERY BIOPSY;  Surgeon: Jaydon Pan MD;  Location:  MAIN OR; Service: General       Social History     Tobacco Use    Smoking status: Never Smoker    Smokeless tobacco: Never Used   Substance Use Topics    Alcohol use: Yes     Comment: socially       Social History     Social History Narrative     since 1981  Two sons  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, gardening, walking, needle work, painting, writing books  The following portions of the patient's history were reviewed and updated as appropriate: allergies, current medications, past family history, past medical history, past social history, past surgical history and problem list       Review of Systems   Constitutional: Negative for activity change and appetite change  HENT: Negative for ear pain and hearing loss  Eyes: Negative for visual disturbance  Respiratory: Negative for shortness of breath and wheezing  Cardiovascular: Negative for chest pain and leg swelling  Gastrointestinal: Negative for abdominal pain, constipation and diarrhea  Genitourinary: Negative for difficulty urinating  Musculoskeletal: Negative for arthralgias and back pain  Skin: Negative for rash  Neurological: Negative for headaches  Psychiatric/Behavioral: Positive for dysphoric mood  The patient is not nervous/anxious  /79 (BP Location: Left arm, Patient Position: Sitting, Cuff Size: Large)   Pulse 65   Temp (!) 96 8 °F (36 °C) (Temporal)   Ht 5' 3 5" (1 613 m)   Wt 79 9 kg (176 lb 3 2 oz)   SpO2 98%   BMI 30 72 kg/m²      Physical Exam    Healthy appearing individual in no acute distress  Extraocular motions are intact  Both ear drums are white  Hearing is grossly intact  Throat reveals no erythema  Teeth are in good repair  No neck nodes or thyromegaly  Lungs are clear  Heart regular with no murmurs or gallops  Abdomen is soft and nontender  No leg edema  Skin reveals no apparent rash    Neurologic grossly within normal limits  Normal mood and affect  Musculoskeletal exam grossly within normal limits  Except for some tenderness about the right knee  Current Outpatient Medications:     albuterol (PROVENTIL HFA,VENTOLIN HFA) 90 mcg/act inhaler, Inhale 2 puffs every 6 (six) hours as needed for wheezing or shortness of breath, Disp: 1 Inhaler, Rfl: 5    alendronate (FOSAMAX) 70 mg tablet, TAKE 1 TABLET BY MOUTH ONE TIME PER WEEK, Disp: 12 tablet, Rfl: 3    Aluminum & Magnesium Hydroxide (MAGNESIUM-ALUMINUM PO), Take by mouth, Disp: , Rfl:     amLODIPine (NORVASC) 5 mg tablet, TAKE 1 TABLET BY MOUTH DAILY ON MONDAYS, WEDNESDAYS, AND FRIDAYS AND 1/2 TABLET ALL OTHER DAYS , Disp: 64 tablet, Rfl: 5    fluticasone (FLONASE) 50 mcg/act nasal spray, SPRAY 1 SPRAY INTO EACH NOSTRIL EVERY DAY, Disp: 48 mL, Rfl: 3    metoprolol succinate (TOPROL-XL) 100 mg 24 hr tablet, TAKE 1 TABLET BY MOUTH EVERY DAY, Disp: 90 tablet, Rfl: 3    olmesartan (BENICAR) 20 mg tablet, 1/2 tablet daily or as ordered, Disp: 90 tablet, Rfl: 3    Omega-3 1000 MG CAPS, Take by mouth, Disp: , Rfl:     spironolactone (ALDACTONE) 50 mg tablet, TAKE 1 TABLET BY MOUTH EVERY DAY, Disp: 90 tablet, Rfl: 5    triamcinolone (KENALOG) 0 147 MG/GM topical spray, Apply topically 2 (two) times a day, Disp: 63 g, Rfl: 5    sertraline (ZOLOFT) 50 mg tablet, Take 1 tablet (50 mg total) by mouth daily, Disp: 90 tablet, Rfl: 3     No problem-specific Assessment & Plan notes found for this encounter  Diagnoses and all orders for this visit:    Preoperative examination    Primary osteoarthritis of right knee    Benign essential hypertension    Moderate episode of recurrent major depressive disorder (HCC)  -     sertraline (ZOLOFT) 50 mg tablet;  Take 1 tablet (50 mg total) by mouth daily    Mild intermittent asthma without complication        Patient Instructions   Will Chula is felt to be an excellent candidate for anticipated knee replacement surgery  Blood pressure is well controlled  Sertraline is being restarted  Other medications are reviewed and are unchanged  Note sent to Dr Raymundo Jackson  Follow-up as scheduled

## 2022-03-09 NOTE — PATIENT INSTRUCTIONS
Josefa Matt is felt to be an excellent candidate for anticipated knee replacement surgery  Blood pressure is well controlled  Sertraline is being restarted  Other medications are reviewed and are unchanged  Note sent to Dr Parris Lee  Follow-up as scheduled

## 2022-04-12 NOTE — RESULT ENCOUNTER NOTE
Addended by: INGRID ECHEVARRIA on: 4/12/2022 01:21 PM     Modules accepted: Orders     Spoke to Julio on the phone  CRP remains quite elevated  Other blood work okay except for usual hyponatremia with a sodium of 129  Sed rate only minimally elevated  She is scheduled to see Dr Trae Triplett tomorrow and then have a temporal artery  biopsy  Of note, she described her headache as 1-2 when previously it was an 8/10  She also notes that his seeming to spread to the right side

## 2022-05-05 DIAGNOSIS — Z79.52 CURRENT CHRONIC USE OF SYSTEMIC STEROIDS: ICD-10-CM

## 2022-05-05 DIAGNOSIS — I10 BENIGN ESSENTIAL HYPERTENSION: ICD-10-CM

## 2022-05-05 RX ORDER — ALENDRONATE SODIUM 70 MG/1
TABLET ORAL
Qty: 12 TABLET | Refills: 3 | Status: SHIPPED | OUTPATIENT
Start: 2022-05-05 | End: 2022-07-18

## 2022-05-05 RX ORDER — OLMESARTAN MEDOXOMIL 20 MG/1
TABLET ORAL
Qty: 90 TABLET | Refills: 3 | Status: SHIPPED | OUTPATIENT
Start: 2022-05-05

## 2022-06-07 ENCOUNTER — PATIENT MESSAGE (OUTPATIENT)
Dept: FAMILY MEDICINE CLINIC | Facility: CLINIC | Age: 67
End: 2022-06-07

## 2022-06-07 DIAGNOSIS — M17.11 PRIMARY OSTEOARTHRITIS OF RIGHT KNEE: Primary | ICD-10-CM

## 2022-06-13 ENCOUNTER — OFFICE VISIT (OUTPATIENT)
Dept: FAMILY MEDICINE CLINIC | Facility: CLINIC | Age: 67
End: 2022-06-13
Payer: MEDICARE

## 2022-06-13 VITALS
DIASTOLIC BLOOD PRESSURE: 82 MMHG | TEMPERATURE: 97.8 F | SYSTOLIC BLOOD PRESSURE: 142 MMHG | BODY MASS INDEX: 29.33 KG/M2 | HEART RATE: 77 BPM | OXYGEN SATURATION: 98 % | WEIGHT: 171.8 LBS | HEIGHT: 64 IN

## 2022-06-13 DIAGNOSIS — I10 BENIGN ESSENTIAL HYPERTENSION: Primary | ICD-10-CM

## 2022-06-13 DIAGNOSIS — M81.0 AGE-RELATED OSTEOPOROSIS WITHOUT CURRENT PATHOLOGICAL FRACTURE: ICD-10-CM

## 2022-06-13 DIAGNOSIS — F32.5 MAJOR DEPRESSIVE DISORDER WITH SINGLE EPISODE, IN FULL REMISSION (HCC): ICD-10-CM

## 2022-06-13 DIAGNOSIS — S46.919A STRAIN OF ELBOW, UNSPECIFIED LATERALITY, INITIAL ENCOUNTER: ICD-10-CM

## 2022-06-13 DIAGNOSIS — Z96.651 STATUS POST RIGHT KNEE REPLACEMENT: ICD-10-CM

## 2022-06-13 PROCEDURE — 99214 OFFICE O/P EST MOD 30 MIN: CPT | Performed by: FAMILY MEDICINE

## 2022-06-13 NOTE — PATIENT INSTRUCTIONS
Appears to have done great after her knee replacement surgery  Blood pressure is well controlled  Mood is okay without sertraline  Blood work reviewed from [facility] excellent  Continue Fosamax once weekly for osteoporosis  Suggest using Advil or meloxicam for elbow strain and getting a tennis elbow strap to wear while gardening  A 2nd COVID booster is recommended  Recheck in 4 months

## 2022-06-13 NOTE — PROGRESS NOTES
Chief Complaint   Patient presents with    Follow-up     4 month         HPI   Here for follow-up of hypertension, hyponatremia, temporal arteritis, and depression  And knee DJD  Total knee replacement was done on the right side on March 28 and she has done well  Assise from some residual numbness, her pain is markedly decreased and her activity is increasing  Has been going to Dr Juan Hansen for therapy  Has been able to get back into her garden  Having pain in her elbows  Possibly overuse  Arm exercises aggravated  Using Tylenol  And Voltaren gel  Was able to stop sertraline and her mood is okay  Itchy scalp is improved  Using Kenalog spray as needed  Continues on spironolactone  Taking 1/2 tablet of amlodipine 5 mg every day because she had some ankle edema at the higher dose          Past Medical History:   Diagnosis Date    Age-related osteoporosis without current pathological fracture 02/14/2022    Anxiety state 12/19/2014    Benign essential hypertension 01/14/2010    Chronic rhinitis 1/12/2012    Depression 2019    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 hair loss 2/4/2020    Fracture of multiple ribs of left side 12/21/2020    Hyponatremia 10/02/2020    Mild persistent asthma without complication 6/2/1088    Right chronic serous otitis media 11/07/2017    Resolved after myringotomy tube    Scapula fracture 12/21/2020    Temporal arteritis ([address] ) 09/04/2020    Biopsy negative-treated with prednisone for 1 year        Past Surgical History:   Procedure Laterality Date    LAPAROSCOPY      X2 for endometriosis    VT TEMPORAL ARTERY LIGATN OR BX Left 08/14/2020    Procedure: TEMPORAL ARTERY BIOPSY;  Surgeon: Tommy Martin MD;  Location: [address] [place] [address];  Service: General    TOTAL KNEE ARTHROPLASTY Right 03/28/2022    Dr Kathia Arriaga       Social History     Tobacco Use    Smoking status: Never Smoker    Smokeless tobacco: Never Used   Substance Use Topics    Alcohol use: Yes     Comment: socially Social History     Social History Narrative     since 1981  Two sons  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, gardening, walking, needle work, painting, writing books  6/22-[place]  and will be moving back home  The following portions of the patient's history were reviewed and updated as appropriate: allergies, current medications, past family history, past medical history, past social history, past surgical history and problem list       Review of Systems   Constitutional: Negative for activity change and appetite change  HENT: Negative for ear pain and hearing loss  Eyes: Negative for visual disturbance  Respiratory: Negative for shortness of breath and wheezing  Cardiovascular: Negative for chest pain and leg swelling  Gastrointestinal: Negative for abdominal pain, constipation and diarrhea  Genitourinary: Negative for difficulty urinating  Musculoskeletal: Negative for arthralgias and back pain  Skin: Negative for rash  Neurological: Negative for headaches  Psychiatric/Behavioral: Negative for dysphoric mood  The patient is not nervous/anxious  /82 (BP Location: Left arm, Patient Position: Sitting, Cuff Size: Standard)   Pulse 77   Temp 97 8 °F (36 6 °C) (Temporal)   Ht 5' 3 5" (1 613 m)   Wt 77 9 kg (171 lb 12 8 oz)   SpO2 98%   BMI 29 96 kg/m²      Physical Exam   Repeat blood pressure 128/76  Appears well  Lungs are clear  Heart regular  The right knee shows wonderful scar  No thickening at all  Mood is upbeat  Affect appropriate  There is some tenderness on the medial aspect of the right elbow  Left elbow reveals tenderness over both lateral and medial epicondyles                Current Outpatient Medications:     albuterol (PROVENTIL HFA,VENTOLIN HFA) 90 mcg/act inhaler, Inhale 2 puffs every 6 (six) hours as needed for wheezing or shortness of breath, Disp: 1 Inhaler, Rfl: 5    alendronate (FOSAMAX) 70 mg tablet, TAKE 1 TABLET BY MOUTH ONE TIME PER WEEK, Disp: 12 tablet, Rfl: 3    Aluminum & Magnesium Hydroxide (MAGNESIUM-ALUMINUM PO), Take by mouth, Disp: , Rfl:     amLODIPine (NORVASC) 5 mg tablet, TAKE 1 TABLET BY MOUTH DAILY ON MONDAYS, WEDNESDAYS, AND FRIDAYS AND 1/2 TABLET ALL OTHER DAYS  (Patient taking differently: Take by mouth daily Take 1/2 tab daily), Disp: 64 tablet, Rfl: 5    Diclofenac Sodium (VOLTAREN) 1 %, Apply 4 g topically 4 (four) times a day, Disp: 500 g, Rfl: 5    fluticasone (FLONASE) 50 mcg/act nasal spray, SPRAY 1 SPRAY INTO EACH NOSTRIL EVERY DAY, Disp: 48 mL, Rfl: 3    metoprolol succinate (TOPROL-XL) 100 mg 24 hr tablet, TAKE 1 TABLET BY MOUTH EVERY DAY, Disp: 90 tablet, Rfl: 3    olmesartan (BENICAR) 20 mg tablet, TAKE 1 TABLET BY MOUTH EVERY DAY, Disp: 90 tablet, Rfl: 3    Omega-3 1000 MG CAPS, Take by mouth, Disp: , Rfl:     spironolactone (ALDACTONE) 50 mg tablet, TAKE 1 TABLET BY MOUTH EVERY DAY, Disp: 90 tablet, Rfl: 3    triamcinolone (KENALOG) 0 147 MG/GM topical spray, Apply topically 2 (two) times a day, Disp: 63 g, Rfl: 5     No problem-specific Assessment & Plan notes found for this encounter  Diagnoses and all orders for this visit:    Benign essential hypertension    Age-related osteoporosis without current pathological fracture    Major depressive disorder with single episode, in full remission ([facility] [address] )    Status post right knee replacement    Strain of elbow, unspecified laterality, initial encounter        Patient Instructions   Appears to have done great after her knee replacement surgery  Blood pressure is well controlled  Mood is okay without sertraline  Blood work reviewed from [facility] excellent  Continue Fosamax once weekly for osteoporosis  Suggest using Advil or meloxicam for elbow strain and getting a tennis elbow strap to wear while gardening      A 2nd COVID booster is recommended  Recheck in 4 months

## 2022-06-24 DIAGNOSIS — M25.562 CHRONIC PAIN OF BOTH KNEES: ICD-10-CM

## 2022-06-24 DIAGNOSIS — G89.29 CHRONIC PAIN OF BOTH KNEES: ICD-10-CM

## 2022-06-24 DIAGNOSIS — M25.561 CHRONIC PAIN OF BOTH KNEES: ICD-10-CM

## 2022-06-24 RX ORDER — MELOXICAM 7.5 MG/1
TABLET ORAL
Qty: 90 TABLET | Refills: 3 | Status: SHIPPED | OUTPATIENT
Start: 2022-06-24

## 2022-08-12 ENCOUNTER — OFFICE VISIT (OUTPATIENT)
Dept: URGENT CARE | Age: 67
End: 2022-08-12
Payer: MEDICARE

## 2022-08-12 VITALS
TEMPERATURE: 97.5 F | HEART RATE: 57 BPM | SYSTOLIC BLOOD PRESSURE: 132 MMHG | OXYGEN SATURATION: 97 % | DIASTOLIC BLOOD PRESSURE: 72 MMHG | RESPIRATION RATE: 18 BRPM

## 2022-08-12 DIAGNOSIS — J30.1 SEASONAL ALLERGIC RHINITIS DUE TO POLLEN: ICD-10-CM

## 2022-08-12 DIAGNOSIS — J45.21 MILD INTERMITTENT ASTHMA WITH ACUTE EXACERBATION: Primary | ICD-10-CM

## 2022-08-12 PROCEDURE — 99213 OFFICE O/P EST LOW 20 MIN: CPT

## 2022-08-12 PROCEDURE — G0463 HOSPITAL OUTPT CLINIC VISIT: HCPCS

## 2022-08-12 RX ORDER — ALBUTEROL SULFATE 90 UG/1
2 AEROSOL, METERED RESPIRATORY (INHALATION) EVERY 6 HOURS PRN
Qty: 8.5 G | Refills: 0 | Status: SHIPPED | OUTPATIENT
Start: 2022-08-12 | End: 2022-08-29 | Stop reason: SDUPTHER

## 2022-08-12 RX ORDER — LORATADINE 10 MG/1
10 TABLET ORAL DAILY
Qty: 30 TABLET | Refills: 0 | Status: SHIPPED | OUTPATIENT
Start: 2022-08-12

## 2022-08-12 RX ORDER — BENZONATATE 200 MG/1
200 CAPSULE ORAL 3 TIMES DAILY PRN
Qty: 20 CAPSULE | Refills: 0 | Status: SHIPPED | OUTPATIENT
Start: 2022-08-12 | End: 2022-10-24 | Stop reason: ALTCHOICE

## 2022-08-12 NOTE — PROGRESS NOTES
[facility]        NAME: Shasha Velasquez is a 69 y o  female  : 1955    MRN: [id number]  DATE: 2022  TIME: 10:01 AM    Assessment and Plan   Mild intermittent asthma with acute exacerbation [J45 21]  1  Mild intermittent asthma with acute exacerbation  albuterol (ProAir HFA) 90 mcg/act inhaler    benzonatate (TESSALON) 200 MG capsule   2  Seasonal allergic rhinitis due to pollen  loratadine (CLARITIN) 10 mg tablet     Patient with history of asthma and seasonal allergies presents with cough, PND and wheezing at times  She does not take anything currently daily for allergies and does have albuterol ordered as needed  Coughing fits have increased wheezing  Denies SOB, fevers, resp distress  Assessment notes wheezing with cough, congestion and PND  Patient lung fields open  No distress or color change noted  No adenopathy  Will refill inhaler and order tessalon and claritin  COVID negative    Patient Instructions       Follow up with PCP as needed    Chief Complaint     Chief Complaint   Patient presents with    Cold Like Symptoms     Cough and chest tightness starting yesterday afternoon  States she has used asthma inhaler and has been helping  Productive cough with phlegm  Covid vaccinated  Denies other symptoms         History of Present Illness       Patient with history of asthma and seasonal allergies presents with cough, PND and wheezing at times  She does not take anything currently daily for allergies and does have albuterol ordered as needed  Coughing fits have increased wheezing  Denies SOB, fevers, resp distress  Assessment notes wheezing with cough, congestion and PND  Patient lung fields open  No distress or color change noted  No adenopathy  Will refill inhaler and order tessalon and claritin  Review of Systems   Review of Systems   Constitutional: Negative for chills and fever  HENT: Positive for congestion and postnasal drip   Negative for ear pain, sinus pain and sore throat  Eyes: Negative for pain and itching  Respiratory: Positive for cough and wheezing  Negative for shortness of breath  Cardiovascular: Negative for chest pain and palpitations  Gastrointestinal: Negative for abdominal pain, constipation, diarrhea, nausea and vomiting  Genitourinary: Negative for difficulty urinating and hematuria  Musculoskeletal: Negative for arthralgias and myalgias  Skin: Negative for rash  Allergic/Immunologic: Positive for environmental allergies  Neurological: Negative for dizziness, light-headedness and headaches  Psychiatric/Behavioral: Negative for agitation and sleep disturbance  The patient is not nervous/anxious            Current Medications       Current Outpatient Medications:     albuterol (ProAir HFA) 90 mcg/act inhaler, Inhale 2 puffs every 6 (six) hours as needed for wheezing, Disp: 8 5 g, Rfl: 0    alendronate (FOSAMAX) 70 mg tablet, TAKE 1 TABLET BY MOUTH ONE TIME PER WEEK, Disp: 12 tablet, Rfl: 3    Aluminum & Magnesium Hydroxide (MAGNESIUM-ALUMINUM PO), Take by mouth, Disp: , Rfl:     amLODIPine (NORVASC) 5 mg tablet, TAKE 1 TABLET BY MOUTH DAILY ON MONDAYS, WEDNESDAYS, AND FRIDAYS AND 1/2 TABLET ALL OTHER DAYS  (Patient taking differently: Take by mouth daily Take 1/2 tab daily), Disp: 64 tablet, Rfl: 5    benzonatate (TESSALON) 200 MG capsule, Take 1 capsule (200 mg total) by mouth 3 (three) times a day as needed for cough, Disp: 20 capsule, Rfl: 0    Diclofenac Sodium (VOLTAREN) 1 %, Apply 4 g topically 4 (four) times a day, Disp: 500 g, Rfl: 5    fluticasone (FLONASE) 50 mcg/act nasal spray, SPRAY 1 SPRAY INTO EACH NOSTRIL EVERY DAY, Disp: 48 mL, Rfl: 3    loratadine (CLARITIN) 10 mg tablet, Take 1 tablet (10 mg total) by mouth daily, Disp: 30 tablet, Rfl: 0    meloxicam (MOBIC) 7 5 mg tablet, TAKE 1 TABLET BY MOUTH EVERY DAY, Disp: 90 tablet, Rfl: 3    metoprolol succinate (TOPROL-XL) 100 mg 24 hr tablet, TAKE 1 TABLET BY MOUTH EVERY DAY, Disp: 90 tablet, Rfl: 3    olmesartan (BENICAR) 20 mg tablet, TAKE 1 TABLET BY MOUTH EVERY DAY, Disp: 90 tablet, Rfl: 3    Omega-3 1000 MG CAPS, Take by mouth, Disp: , Rfl:     spironolactone (ALDACTONE) 50 mg tablet, TAKE 1 TABLET BY MOUTH EVERY DAY, Disp: 90 tablet, Rfl: 3    triamcinolone (KENALOG) 0 147 MG/GM topical spray, Apply topically 2 (two) times a day, Disp: 63 g, Rfl: 5    Current Allergies     Allergies as of 08/12/2022    (No Known Allergies)            The following portions of the patient's history were reviewed and updated as appropriate: allergies, current medications, past family history, past medical history, past social history, past surgical history and problem list      Past Medical History:   Diagnosis Date    Age-related osteoporosis without current pathological fracture 02/14/2022    Anxiety state 12/19/2014    Benign essential hypertension 01/14/2010    Chronic rhinitis 1/12/2012    Depression 2019    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 hair loss 2/4/2020    Fracture of multiple ribs of left side 12/21/2020    Hyponatremia 10/02/2020    Mild persistent asthma without complication 2/3/5862    Right chronic serous otitis media 11/07/2017    Resolved after myringotomy tube    Scapula fracture 12/21/2020    Temporal arteritis ([address] ) 09/04/2020    Biopsy negative-treated with prednisone for 1 year       Past Surgical History:   Procedure Laterality Date    LAPAROSCOPY      X2 for endometriosis    NE TEMPORAL ARTERY LIGATN OR BX Left 08/14/2020    Procedure: TEMPORAL ARTERY BIOPSY;  Surgeon: Nita Mayo MD;  Location: [address] [place] [address];  Service: General    TOTAL KNEE ARTHROPLASTY Right 03/28/2022    Dr Triny Duarte       Family History   Problem Relation Age of Onset    Coronary artery disease Mother     Coronary artery disease Father     No Known Problems Maternal Grandmother     No Known Problems Maternal Grandfather     Leukemia Paternal Grandmother 71    No Known Problems Paternal Grandfather     No Known Problems Son     No Known Problems Son          Medications have been verified  Objective   /72   Pulse 57   Temp 97 5 °F (36 4 °C)   Resp 18   SpO2 97%   No LMP recorded  Patient is postmenopausal        Physical Exam     Physical Exam  Vitals reviewed  Constitutional:       General: She is not in acute distress  Appearance: Normal appearance  HENT:      Head: Normocephalic and atraumatic  Right Ear: Tympanic membrane and ear canal normal       Left Ear: Tympanic membrane and ear canal normal       Nose: Congestion present  Mouth/Throat:      Mouth: Mucous membranes are moist       Pharynx: No oropharyngeal exudate or posterior oropharyngeal erythema  Eyes:      Extraocular Movements: Extraocular movements intact  Conjunctiva/sclera: Conjunctivae normal       Pupils: Pupils are equal, round, and reactive to light  Cardiovascular:      Rate and Rhythm: Normal rate and regular rhythm  Pulses: Normal pulses  Heart sounds: Normal heart sounds  No murmur heard  Pulmonary:      Effort: Pulmonary effort is normal  No respiratory distress  Breath sounds: Wheezing present  No rhonchi  Abdominal:      General: Abdomen is flat  Bowel sounds are normal  There is no distension  Palpations: Abdomen is soft  Tenderness: There is no abdominal tenderness  There is no guarding or rebound  Musculoskeletal:         General: No swelling or tenderness  Normal range of motion  Cervical back: Normal range of motion and neck supple  No tenderness  Lymphadenopathy:      Cervical: No cervical adenopathy  Skin:     General: Skin is warm  Neurological:      General: No focal deficit present  Mental Status: She is alert     Psychiatric:         Mood and Affect: Mood normal          Behavior: Behavior normal          Judgment: Judgment normal

## 2022-08-19 ENCOUNTER — HOSPITAL ENCOUNTER (EMERGENCY)
Facility: HOSPITAL | Age: 67
Discharge: HOME | End: 2022-08-19
Attending: EMERGENCY MEDICINE | Admitting: EMERGENCY MEDICINE
Payer: MEDICARE

## 2022-08-19 ENCOUNTER — APPOINTMENT (OUTPATIENT)
Dept: RADIOLOGY | Facility: HOSPITAL | Age: 67
End: 2022-08-19
Payer: MEDICARE

## 2022-08-19 ENCOUNTER — OFFICE VISIT (OUTPATIENT)
Dept: URGENT CARE | Age: 67
End: 2022-08-19
Payer: MEDICARE

## 2022-08-19 VITALS
DIASTOLIC BLOOD PRESSURE: 66 MMHG | OXYGEN SATURATION: 99 % | SYSTOLIC BLOOD PRESSURE: 137 MMHG | WEIGHT: 171.96 LBS | TEMPERATURE: 97.9 F | RESPIRATION RATE: 16 BRPM | BODY MASS INDEX: 29.98 KG/M2 | HEART RATE: 65 BPM

## 2022-08-19 VITALS
SYSTOLIC BLOOD PRESSURE: 120 MMHG | RESPIRATION RATE: 18 BRPM | HEART RATE: 72 BPM | TEMPERATURE: 96.8 F | OXYGEN SATURATION: 96 % | DIASTOLIC BLOOD PRESSURE: 72 MMHG

## 2022-08-19 DIAGNOSIS — J45.901 ASTHMA EXACERBATION: ICD-10-CM

## 2022-08-19 DIAGNOSIS — R05.9 COUGH: Primary | ICD-10-CM

## 2022-08-19 DIAGNOSIS — J45.21 MILD INTERMITTENT ASTHMA WITH ACUTE EXACERBATION: Primary | ICD-10-CM

## 2022-08-19 PROCEDURE — G0463 HOSPITAL OUTPT CLINIC VISIT: HCPCS | Performed by: STUDENT IN AN ORGANIZED HEALTH CARE EDUCATION/TRAINING PROGRAM

## 2022-08-19 PROCEDURE — 94640 AIRWAY INHALATION TREATMENT: CPT

## 2022-08-19 PROCEDURE — 99213 OFFICE O/P EST LOW 20 MIN: CPT | Performed by: STUDENT IN AN ORGANIZED HEALTH CARE EDUCATION/TRAINING PROGRAM

## 2022-08-19 PROCEDURE — 99283 EMERGENCY DEPT VISIT LOW MDM: CPT

## 2022-08-19 PROCEDURE — 99284 EMERGENCY DEPT VISIT MOD MDM: CPT | Performed by: EMERGENCY MEDICINE

## 2022-08-19 PROCEDURE — 71046 X-RAY EXAM CHEST 2 VIEWS: CPT

## 2022-08-19 RX ORDER — PREDNISONE 20 MG/1
40 TABLET ORAL DAILY
Qty: 10 TABLET | Refills: 0 | Status: SHIPPED | OUTPATIENT
Start: 2022-08-19 | End: 2022-08-24

## 2022-08-19 RX ORDER — PREDNISONE 20 MG/1
60 TABLET ORAL ONCE
Status: COMPLETED | OUTPATIENT
Start: 2022-08-19 | End: 2022-08-19

## 2022-08-19 RX ORDER — IPRATROPIUM BROMIDE AND ALBUTEROL SULFATE 2.5; .5 MG/3ML; MG/3ML
3 SOLUTION RESPIRATORY (INHALATION)
Status: DISCONTINUED | OUTPATIENT
Start: 2022-08-19 | End: 2022-08-20 | Stop reason: HOSPADM

## 2022-08-19 RX ADMIN — IPRATROPIUM BROMIDE AND ALBUTEROL SULFATE 3 ML: 2.5; .5 SOLUTION RESPIRATORY (INHALATION) at 22:04

## 2022-08-19 RX ADMIN — PREDNISONE 60 MG: 20 TABLET ORAL at 22:03

## 2022-08-19 NOTE — PROGRESS NOTES
[facility]        NAME: Agnes Velasquez is a 69 y o  female  : 1955    MRN: [id number]  DATE: 2022  TIME: 5:10 PM    Assessment and Plan   Mild intermittent asthma with acute exacerbation [J45 21]  1  Mild intermittent asthma with acute exacerbation  predniSONE 20 mg tablet         Patient Instructions     Encouraged patient to continue to use her albuterol inhaler OR albuterol nebulizer as needed  Given her acute wheezing, will treat with a short course of oral steroids  She should follow up with her PCP as she may need a maintenance inhaler  Follow up with PCP in 3-5 days  Proceed to  ER if symptoms worsen  Chief Complaint     Chief Complaint   Patient presents with    Cough     Cough and chest tightness  Medicine given last week for cough/allergies is not helping  States albuterol helps temporarily  Productive cough         History of Present Illness     Patient is a 70 yo F with a history of mild asthma who presents with continued cough and shortness of breath  She was recently seen here on  and was given Claritin, tessalon, and an albuterol inhaler  She states that the Claritin and tessalon did not seem to alleviate her symptoms, but that the albuterol inhaler helps temporarily  She also has an albuterol nebulizer at home which she says also works well  She denies chest pain  She has wheezing and shortness of breath  She continues to have some congestion but feels this is improving  No fevers       Review of Systems   Review of Systems  As noted in HPI     Current Medications       Current Outpatient Medications:     albuterol (ProAir HFA) 90 mcg/act inhaler, Inhale 2 puffs every 6 (six) hours as needed for wheezing, Disp: 8 5 g, Rfl: 0    alendronate (FOSAMAX) 70 mg tablet, TAKE 1 TABLET BY MOUTH ONE TIME PER WEEK, Disp: 12 tablet, Rfl: 3    Aluminum & Magnesium Hydroxide (MAGNESIUM-ALUMINUM PO), Take by mouth, Disp: , Rfl:     benzonatate (TESSALON) 200 MG capsule, Take 1 capsule (200 mg total) by mouth 3 (three) times a day as needed for cough, Disp: 20 capsule, Rfl: 0    Diclofenac Sodium (VOLTAREN) 1 %, Apply 4 g topically 4 (four) times a day, Disp: 500 g, Rfl: 5    fluticasone (FLONASE) 50 mcg/act nasal spray, SPRAY 1 SPRAY INTO EACH NOSTRIL EVERY DAY, Disp: 48 mL, Rfl: 3    loratadine (CLARITIN) 10 mg tablet, Take 1 tablet (10 mg total) by mouth daily, Disp: 30 tablet, Rfl: 0    meloxicam (MOBIC) 7 5 mg tablet, TAKE 1 TABLET BY MOUTH EVERY DAY, Disp: 90 tablet, Rfl: 3    metoprolol succinate (TOPROL-XL) 100 mg 24 hr tablet, TAKE 1 TABLET BY MOUTH EVERY DAY, Disp: 90 tablet, Rfl: 3    olmesartan (BENICAR) 20 mg tablet, TAKE 1 TABLET BY MOUTH EVERY DAY, Disp: 90 tablet, Rfl: 3    Omega-3 1000 MG CAPS, Take by mouth, Disp: , Rfl:     predniSONE 20 mg tablet, Take 2 tablets (40 mg total) by mouth daily for 5 days, Disp: 10 tablet, Rfl: 0    spironolactone (ALDACTONE) 50 mg tablet, TAKE 1 TABLET BY MOUTH EVERY DAY, Disp: 90 tablet, Rfl: 3    triamcinolone (KENALOG) 0 147 MG/GM topical spray, Apply topically 2 (two) times a day, Disp: 63 g, Rfl: 5    amLODIPine (NORVASC) 5 mg tablet, TAKE 1 TABLET BY MOUTH DAILY ON MONDAYS, WEDNESDAYS, AND FRIDAYS AND 1/2 TABLET ALL OTHER DAYS  (Patient taking differently: Take by mouth daily Take 1/2 tab daily), Disp: 64 tablet, Rfl: 5    Current Allergies     Allergies as of 08/19/2022    (No Known Allergies)            The following portions of the patient's history were reviewed and updated as appropriate: allergies, current medications, past family history, past medical history, past social history, past surgical history and problem list      Past Medical History:   Diagnosis Date    Age-related osteoporosis without current pathological fracture 02/14/2022    Anxiety state 12/19/2014    Benign essential hypertension 01/14/2010    Chronic rhinitis 1/12/2012    Depression 2019    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 hair loss 2/4/2020  Fracture of multiple ribs of left side 12/21/2020    Hyponatremia 10/02/2020    Mild persistent asthma without complication 1/6/6200    Right chronic serous otitis media 11/07/2017    Resolved after myringotomy tube    Scapula fracture 12/21/2020    Temporal arteritis ([address] ) 09/04/2020    Biopsy negative-treated with prednisone for 1 year       Past Surgical History:   Procedure Laterality Date    LAPAROSCOPY      X2 for endometriosis    ID TEMPORAL ARTERY LIGATN OR BX Left 08/14/2020    Procedure: TEMPORAL ARTERY BIOPSY;  Surgeon: Billy Lakhani MD;  Location: [facility] MAIN OR;  Service: General    TOTAL KNEE ARTHROPLASTY Right 03/28/2022    Dr Ismael Romero       Family History   Problem Relation Age of Onset    Coronary artery disease Mother     Coronary artery disease Father     No Known Problems Maternal Grandmother     No Known Problems Maternal Grandfather     Leukemia Paternal Grandmother 71    No Known Problems Paternal Grandfather     No Known Problems Son     No Known Problems Son          Medications have been verified  Objective   /72   Pulse 72   Temp (!) 96 8 °F (36 °C)   Resp 18   SpO2 96%   No LMP recorded  Patient is postmenopausal        Physical Exam     Physical Exam  Constitutional:       Appearance: Normal appearance  HENT:      Head: Normocephalic and atraumatic  Right Ear: Tympanic membrane normal       Left Ear: Tympanic membrane normal       Nose: Nose normal    Cardiovascular:      Rate and Rhythm: Normal rate and regular rhythm  Pulses: Normal pulses  Pulmonary:      Effort: No respiratory distress  Breath sounds: Wheezing (diffuse) present  Skin:     General: Skin is warm and dry  Neurological:      General: No focal deficit present  Mental Status: She is alert and oriented to person, place, and time     Psychiatric:         Mood and Affect: Mood normal          Behavior: Behavior normal

## 2022-08-20 NOTE — ED ATTENDING ATTESTATION
8/19/2022  IHaider MD, saw and evaluated the patient  I have discussed the patient with the resident/non-physician practitioner and agree with the resident's/non-physician practitioner's findings, Plan of Care, and MDM as documented in the resident's/non-physician practitioner's note, except where noted  All available labs and Radiology studies were reviewed  I was present for key portions of any procedure(s) performed by the resident/non-physician practitioner and I was immediately available to provide assistance  At this point I agree with the current assessment done in the Emergency Department  I have conducted an independent evaluation of this patient a history and physical is as follows:    70 YO female presents with cough, states Hx of asthma, taking albuterol  Pt is having coughing fits, states she cannot catch her breath when coughing  Gen: Pt is in NAD  HEENT: Head is atraumatic, EOM's intact, neck has FROM  Chest: wheezing, no increased work of breathing, non-tender  Heart: RRR  Abdomen: Soft, NT/ND  Musculoskeletal: FROM in all extremities  Skin: No rash, no ecchymosis  Neuro: Awake, alert, oriented x4; Cranial nerves II-XII intact  Psych: Normal affect    MDM -  Duo-neb, CXR, steroids       ED Course         Critical Care Time  Procedures

## 2022-08-20 NOTE — ED PROVIDER NOTES
History  Chief Complaint   Patient presents with    Cough     Patient reports cough and SOB, reports long stretches of coughing spells, reports she has been treated for asthma and allergies this week but reports still coughing  Gayathri Velasquez is a 58-year-old woman with medical history significant for asthma presenting with cough and shortness of breath over the last week  She has been seen at urgent care twice through the symptoms  She was seen earlier today and given a prescription for prednisone, Tessalon Perles  She has not yet taken any of the prednisone  She has an albuterol inhaler that she has been using every 8 hours  This helps her symptoms  Her cough is productive  She has vaccinated against COVID  She was tested for COVID recently which was negative  She does not have a maintenance daily inhaler  No fevers, chills, nausea, vomiting, body aches  Prior to Admission Medications   Prescriptions Last Dose Informant Patient Reported? Taking? Aluminum & Magnesium Hydroxide (MAGNESIUM-ALUMINUM PO)  Self Yes No   Sig: Take by mouth   Diclofenac Sodium (VOLTAREN) 1 %  Self No No   Sig: Apply 4 g topically 4 (four) times a day   Omega-3 1000 MG CAPS  Self Yes No   Sig: Take by mouth   albuterol (ProAir HFA) 90 mcg/act inhaler   No No   Sig: Inhale 2 puffs every 6 (six) hours as needed for wheezing   alendronate (FOSAMAX) 70 mg tablet   No No   Sig: TAKE 1 TABLET BY MOUTH ONE TIME PER WEEK   amLODIPine (NORVASC) 5 mg tablet   No No   Sig: TAKE 1 TABLET BY MOUTH DAILY ON MONDAYS, WEDNESDAYS, AND FRIDAYS AND 1/2 TABLET ALL OTHER DAYS     Patient taking differently: Take by mouth daily Take 1/2 tab daily   benzonatate (TESSALON) 200 MG capsule   No No   Sig: Take 1 capsule (200 mg total) by mouth 3 (three) times a day as needed for cough   fluticasone (FLONASE) 50 mcg/act nasal spray  Self No No   Sig: SPRAY 1 SPRAY INTO EACH NOSTRIL EVERY DAY   loratadine (CLARITIN) 10 mg tablet   No No Sig: Take 1 tablet (10 mg total) by mouth daily   meloxicam (MOBIC) 7 5 mg tablet   No No   Sig: TAKE 1 TABLET BY MOUTH EVERY DAY   metoprolol succinate (TOPROL-XL) 100 mg 24 hr tablet  Self No No   Sig: TAKE 1 TABLET BY MOUTH EVERY DAY   olmesartan (BENICAR) 20 mg tablet  Self No No   Sig: TAKE 1 TABLET BY MOUTH EVERY DAY   predniSONE 20 mg tablet   No No   Sig: Take 2 tablets (40 mg total) by mouth daily for 5 days   spironolactone (ALDACTONE) 50 mg tablet  Self No No   Sig: TAKE 1 TABLET BY MOUTH EVERY DAY   triamcinolone (KENALOG) 0 147 MG/GM topical spray  Self No No   Sig: Apply topically 2 (two) times a day      Facility-Administered Medications: None       Past Medical History:   Diagnosis Date    Age-related osteoporosis without current pathological fracture 02/14/2022    Anxiety state 12/19/2014    Benign essential hypertension 01/14/2010    Chronic rhinitis 1/12/2012    Depression 2019    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 hair loss 2/4/2020    Fracture of multiple ribs of left side 12/21/2020    Hyponatremia 10/02/2020    Mild persistent asthma without complication 2/7/2230    Right chronic serous otitis media 11/07/2017    Resolved after myringotomy tube    Scapula fracture 12/21/2020    Temporal arteritis ([address] ) 09/04/2020    Biopsy negative-treated with prednisone for 1 year       Past Surgical History:   Procedure Laterality Date    LAPAROSCOPY      X2 for endometriosis    ND TEMPORAL ARTERY LIGATN OR BX Left 08/14/2020    Procedure: TEMPORAL ARTERY BIOPSY;  Surgeon: Sesar Schultz MD;  Location: [address] [place] [address] OR;  Service: General    TOTAL KNEE ARTHROPLASTY Right 03/28/2022    Dr Jody Sloan       Family History   Problem Relation Age of Onset    Coronary artery disease Mother     Coronary artery disease Father     No Known Problems Maternal Grandmother     No Known Problems Maternal Grandfather     Leukemia Paternal Grandmother 71    No Known Problems Paternal Grandfather     No Known Problems Son     No Known Problems Son      I have reviewed and agree with the history as documented  E-Cigarette/Vaping    E-Cigarette Use Never User      E-Cigarette/Vaping Substances    Nicotine No     THC No     CBD No     Flavoring No     Other No     Unknown No      Social History     Tobacco Use    Smoking status: Never Smoker    Smokeless tobacco: Never Used   Vaping Use    Vaping Use: Never used   Substance Use Topics    Alcohol use: Yes     Comment: socially    Drug use: Never        Review of Systems   All other systems reviewed and are negative  Physical Exam  ED Triage Vitals [08/19/22 2031]   Temperature Pulse Respirations Blood Pressure SpO2   97 9 °F (36 6 °C) 65 16 (!) 188/88 96 %      Temp Source Heart Rate Source Patient Position - Orthostatic VS BP Location FiO2 (%)   Oral Monitor Sitting Right arm --      Pain Score       No Pain             Orthostatic Vital Signs  Vitals:    08/19/22 2031 08/19/22 2244   BP: (!) 188/88 137/66   Pulse: 65 65   Patient Position - Orthostatic VS: Sitting Lying       Physical Exam  Vitals and nursing note reviewed  Constitutional:       General: She is not in acute distress  Appearance: She is not ill-appearing  HENT:      Head: Normocephalic and atraumatic  Right Ear: External ear normal       Left Ear: External ear normal       Nose: Nose normal       Mouth/Throat:      Mouth: Mucous membranes are moist    Eyes:      Conjunctiva/sclera: Conjunctivae normal    Cardiovascular:      Rate and Rhythm: Normal rate and regular rhythm  Pulmonary:      Effort: Pulmonary effort is normal  No respiratory distress  Breath sounds: Wheezing present  No rhonchi  Abdominal:      General: There is no distension  Musculoskeletal:         General: No deformity  Skin:     General: Skin is warm and dry  Capillary Refill: Capillary refill takes less than 2 seconds  Neurological:      General: No focal deficit present  Mental Status: She is alert  ED Medications  Medications   ipratropium-albuterol (DUO-NEB) 0 5-2 5 mg/3 mL inhalation solution 3 mL (3 mL Nebulization Given 8/19/22 2204)   predniSONE tablet 60 mg (60 mg Oral Given 8/19/22 2203)       Diagnostic Studies  Results Reviewed     None                 XR chest 2 views   ED Interpretation by Ruth Jenkins MD (08/19 2220)   No acute cardiopulmonary disease  No focal consolidation concerning for pneumonia  Procedures  Procedures      ED Course                             SBIRT 20yo+    Flowsheet Row Most Recent Value   SBIRT (23 yo +)    In order to provide better care to our patients, we are screening all of our patients for alcohol and drug use  Would it be okay to ask you these screening questions? No Filed at: 08/19/2022 2209                MDM  Number of Diagnoses or Management Options  Asthma exacerbation  Cough  Diagnosis management comments: 68 yo woman presenting with cough, shortness of breath  With wheezing, probable asthma exacerbation  Will assess for pneumonia with CXR  Duoneb given for symptom control, and first dose of prednisone given in ED  CXR shows no pneumonia  Return precautions given  Recommended OTC meds and honey for symptom control  Patient has albuterol at home, does not currently need a refill  Discharged home in stable condition  Disposition  Final diagnoses:   Cough   Asthma exacerbation     Time reflects when diagnosis was documented in both MDM as applicable and the Disposition within this note     Time User Action Codes Description Comment    8/19/2022 10:21 PM Beto Katz [R05 [id number]] Cough     8/19/2022 10:21 PM Beto Katz [P19 [id number]] Asthma exacerbation       ED Disposition     ED Disposition   Discharge    Condition   Stable    Date/Time   Fri Aug 19, 2022 10:21 PM    Comment   Ami Velasquez discharge to home/self care                 Follow-up Information     Follow up With Specialties Details Why Contact Info Additional Information Tang Howard MD Family Medicine   [facility] Emergency Department Emergency Medicine   [facility] [id number] [address] [facility] Emergency Department, [address]  , [place], [place], [address]          Discharge Medication List as of 8/19/2022 10:22 PM      CONTINUE these medications which have NOT CHANGED    Details   albuterol (ProAir HFA) 90 mcg/act inhaler Inhale 2 puffs every 6 (six) hours as needed for wheezing, Starting Fri 8/12/2022, Normal      alendronate (FOSAMAX) 70 mg tablet TAKE 1 TABLET BY MOUTH ONE TIME PER WEEK, Normal      Aluminum & Magnesium Hydroxide (MAGNESIUM-ALUMINUM PO) Take by mouth, Historical Med      amLODIPine (NORVASC) 5 mg tablet TAKE 1 TABLET BY MOUTH DAILY ON MONDAYS, WEDNESDAYS, AND FRIDAYS AND 1/2 TABLET ALL OTHER DAYS , Normal      benzonatate (TESSALON) 200 MG capsule Take 1 capsule (200 mg total) by mouth 3 (three) times a day as needed for cough, Starting Fri 8/12/2022, Normal      Diclofenac Sodium (VOLTAREN) 1 % Apply 4 g topically 4 (four) times a day, Starting Tue 6/7/2022, Normal      fluticasone (FLONASE) 50 mcg/act nasal spray SPRAY 1 SPRAY INTO EACH NOSTRIL EVERY DAY, Normal      loratadine (CLARITIN) 10 mg tablet Take 1 tablet (10 mg total) by mouth daily, Starting Fri 8/12/2022, Normal      meloxicam (MOBIC) 7 5 mg tablet TAKE 1 TABLET BY MOUTH EVERY DAY, Normal      metoprolol succinate (TOPROL-XL) 100 mg 24 hr tablet TAKE 1 TABLET BY MOUTH EVERY DAY, Normal      olmesartan (BENICAR) 20 mg tablet TAKE 1 TABLET BY MOUTH EVERY DAY, Normal      Omega-3 1000 MG CAPS Take by mouth, Historical Med      predniSONE 20 mg tablet Take 2 tablets (40 mg total) by mouth daily for 5 days, Starting Fri 8/19/2022, Until Wed 8/24/2022, Normal      spironolactone (ALDACTONE) 50 mg tablet TAKE 1 TABLET BY MOUTH EVERY DAY, Normal      triamcinolone (KENALOG) 0 147 MG/GM topical spray Apply topically 2 (two) times a day, Starting Mon 2/14/2022, Normal           No discharge procedures on file  PDMP Review       Value Time User    PDMP Reviewed  Yes 8/19/2021  1:31 PM Vero Tinoco MD           ED Provider  Attending physically available and evaluated Robert Velasquez I managed the patient along with the ED Attending      Electronically Signed by         Vivi Dos Santos MD  08/19/22 7535

## 2022-08-27 DIAGNOSIS — J45.20 MILD INTERMITTENT ASTHMA WITHOUT COMPLICATION: Primary | ICD-10-CM

## 2022-08-27 RX ORDER — BUDESONIDE AND FORMOTEROL FUMARATE DIHYDRATE 80; 4.5 UG/1; UG/1
2 AEROSOL RESPIRATORY (INHALATION) 2 TIMES DAILY
Qty: 10.2 G | Refills: 5 | Status: SHIPPED | OUTPATIENT
Start: 2022-08-27

## 2022-08-27 RX ORDER — PREDNISONE 10 MG/1
TABLET ORAL
Qty: 30 TABLET | Refills: 0 | Status: SHIPPED | OUTPATIENT
Start: 2022-08-27 | End: 2022-10-18 | Stop reason: SDUPTHER

## 2022-08-27 NOTE — PROGRESS NOTES
Having a lot of wheezing  Two urgent care visits and an emergency room visit  Given five days of prednisone which is done  Had a really bad night last night  New prescription given for prednisone 50, 40, 30, 20, 10-2 days each  Begin Symbicort 82 puffs twice daily  Advised to use her albuterol every three or four hours if necessary either via nebulizer or handheld device  She was asked to make an appointment to see me on Monday in two days

## 2022-08-29 ENCOUNTER — NURSE TRIAGE (OUTPATIENT)
Dept: OTHER | Facility: OTHER | Age: 67
End: 2022-08-29

## 2022-08-29 ENCOUNTER — OFFICE VISIT (OUTPATIENT)
Dept: FAMILY MEDICINE CLINIC | Facility: CLINIC | Age: 67
End: 2022-08-29
Payer: MEDICARE

## 2022-08-29 VITALS
HEIGHT: 64 IN | TEMPERATURE: 96.7 F | WEIGHT: 170.2 LBS | OXYGEN SATURATION: 99 % | DIASTOLIC BLOOD PRESSURE: 60 MMHG | HEART RATE: 68 BPM | SYSTOLIC BLOOD PRESSURE: 96 MMHG | BODY MASS INDEX: 29.06 KG/M2

## 2022-08-29 DIAGNOSIS — J45.21 MILD INTERMITTENT ASTHMA WITH ACUTE EXACERBATION: Primary | ICD-10-CM

## 2022-08-29 PROCEDURE — 99214 OFFICE O/P EST MOD 30 MIN: CPT | Performed by: FAMILY MEDICINE

## 2022-08-29 RX ORDER — ALBUTEROL SULFATE 90 UG/1
2 AEROSOL, METERED RESPIRATORY (INHALATION) EVERY 6 HOURS PRN
Qty: 8.5 G | Refills: 5 | Status: SHIPPED | OUTPATIENT
Start: 2022-08-29

## 2022-08-29 RX ORDER — ALBUTEROL SULFATE 2.5 MG/3ML
2.5 SOLUTION RESPIRATORY (INHALATION) EVERY 6 HOURS PRN
Qty: 180 ML | Refills: 5 | Status: SHIPPED | OUTPATIENT
Start: 2022-08-29

## 2022-08-29 RX ORDER — FLUTICASONE PROPIONATE AND SALMETEROL 250; 50 UG/1; UG/1
1 POWDER RESPIRATORY (INHALATION) 2 TIMES DAILY
Qty: 60 BLISTER | Refills: 5 | Status: SHIPPED | OUTPATIENT
Start: 2022-08-29

## 2022-08-29 NOTE — PATIENT INSTRUCTIONS
By history, wheezing is improved since 2 days ago but still present  Finish course of prednisone which would be a total of 10 days  Albuterol via handheld inhaler or nebulizer should be every 3 or 4 hours as needed  Begin Advair 250 mg twice daily  After she gets back from [facility], can taper to once a day and take it from there

## 2022-08-29 NOTE — PROGRESS NOTES
No chief complaint on file  HPI   Here for follow-up of asthma  Has been wheezing on on for the last 2 weeks  Previous history of asthma about 5 years ago  Has not had a problem for some time  After 2 visits to urgent care in 1 to the emergency room, she had received a short course of prednisoneand when that was done, her wheezing and cough became much worse  Two days ago, another course of prednisone called in along with Symbicort which was not yet available  She notes significant improvement in the last 2 days  Using albuterol 2 or 3 times a day  leaving for [place] in 3 weeks  To see Alexandro De Souza, and [place] Dara Soulier Past Medical History:   Diagnosis Date    Age-related osteoporosis without current pathological fracture 02/14/2022    Anxiety state 12/19/2014    Benign essential hypertension 01/14/2010    Chronic rhinitis 1/12/2012    Depression 2019    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 hair loss 2/4/2020    Fracture of multiple ribs of left side 12/21/2020    Hyponatremia 10/02/2020    Mild persistent asthma without complication 6/2/5360    Right chronic serous otitis media 11/07/2017    Resolved after myringotomy tube    Scapula fracture 12/21/2020    Temporal arteritis ([address] ) 09/04/2020    Biopsy negative-treated with prednisone for 1 year        Past Surgical History:   Procedure Laterality Date    LAPAROSCOPY      X2 for endometriosis    [place] TEMPORAL ARTERY LIGATN OR BX Left 08/14/2020    Procedure: TEMPORAL ARTERY BIOPSY;  Surgeon: Rubia Barriga MD;  Location: [address] [place] [address];  Service: General    TOTAL KNEE ARTHROPLASTY Right 03/28/2022    Dr [employer]       Social History     Tobacco Use    Smoking status: Never Smoker    Smokeless tobacco: Never Used   Substance Use Topics    Alcohol use: Yes     Comment: socially       Social History     Social History Narrative     since 1981  Two sons  Retired in 2018  Was a    is a retired teacher      Enjoys reading, gardening, walking, needle work, painting, writing books  6/22-[place]  and will be moving back home  The following portions of the patient's history were reviewed and updated as appropriate: allergies, current medications, past family history, past medical history, past social history, past surgical history and problem list       Review of Systems       BP 96/60 (BP Location: Left arm, Patient Position: Sitting, Cuff Size: Large)   Pulse 68   Temp (!) 96 7 °F (35 9 °C) (Temporal)   Ht 5' 3 5" (1 613 m)   Wt 77 2 kg (170 lb 3 2 oz)   SpO2 99%   BMI 29 68 kg/m²      Physical Exam     Breathing comfortably with occasional cough  Both eardrums are white  Throat reveals no erythema  Lungs reveal diffuse wheezing with forced expiration which reproduces a hacking cough                Current Outpatient Medications:     albuterol (2 5 mg/3 mL) 0 083 % nebulizer solution, Take 3 mL (2 5 mg total) by nebulization every 6 (six) hours as needed for wheezing or shortness of breath, Disp: 180 mL, Rfl: 5    albuterol (ProAir HFA) 90 mcg/act inhaler, Inhale 2 puffs every 6 (six) hours as needed for wheezing, Disp: 8 5 g, Rfl: 5    alendronate (FOSAMAX) 70 mg tablet, TAKE 1 TABLET BY MOUTH ONE TIME PER WEEK, Disp: 12 tablet, Rfl: 3    Aluminum & Magnesium Hydroxide (MAGNESIUM-ALUMINUM PO), Take by mouth, Disp: , Rfl:     amLODIPine (NORVASC) 5 mg tablet, TAKE 1 TABLET BY MOUTH DAILY ON MONDAYS, WEDNESDAYS, AND FRIDAYS AND 1/2 TABLET ALL OTHER DAYS  (Patient taking differently: Take by mouth daily Take 1/2 tab daily), Disp: 64 tablet, Rfl: 5    benzonatate (TESSALON) 200 MG capsule, Take 1 capsule (200 mg total) by mouth 3 (three) times a day as needed for cough, Disp: 20 capsule, Rfl: 0    budesonide-formoterol (Symbicort) 80-4 5 MCG/ACT inhaler, Inhale 2 puffs 2 (two) times a day Rinse mouth after use , Disp: 10 2 g, Rfl: 5    Diclofenac Sodium (VOLTAREN) 1 %, Apply 4 g topically 4 (four) times a day, Disp: 500 g, Rfl: 5    fluticasone (FLONASE) 50 mcg/act nasal spray, SPRAY 1 SPRAY INTO EACH NOSTRIL EVERY DAY, Disp: 48 mL, Rfl: 3    Fluticasone-Salmeterol (Advair Diskus) 250-50 mcg/dose inhaler, Inhale 1 puff 2 (two) times a day Rinse mouth after use , Disp: 60 blister, Rfl: 5    loratadine (CLARITIN) 10 mg tablet, Take 1 tablet (10 mg total) by mouth daily, Disp: 30 tablet, Rfl: 0    meloxicam (MOBIC) 7 5 mg tablet, TAKE 1 TABLET BY MOUTH EVERY DAY, Disp: 90 tablet, Rfl: 3    metoprolol succinate (TOPROL-XL) 100 mg 24 hr tablet, TAKE 1 TABLET BY MOUTH EVERY DAY, Disp: 90 tablet, Rfl: 3    olmesartan (BENICAR) 20 mg tablet, TAKE 1 TABLET BY MOUTH EVERY DAY, Disp: 90 tablet, Rfl: 3    Omega-3 1000 MG CAPS, Take by mouth, Disp: , Rfl:     predniSONE 10 mg tablet, Take 5 tablets for 2 days, 4 tablets for 2 days, 3 for 2 days, 2 for 2 days, 1 for 2 days, Disp: 30 tablet, Rfl: 0    spironolactone (ALDACTONE) 50 mg tablet, TAKE 1 TABLET BY MOUTH EVERY DAY, Disp: 90 tablet, Rfl: 3    triamcinolone (KENALOG) 0 147 MG/GM topical spray, Apply topically 2 (two) times a day (Patient not taking: Reported on 8/29/2022), Disp: 63 g, Rfl: 5     No problem-specific Assessment & Plan notes found for this encounter  Diagnoses and all orders for this visit:    Mild intermittent asthma with acute exacerbation  -     Fluticasone-Salmeterol (Advair Diskus) 250-50 mcg/dose inhaler; Inhale 1 puff 2 (two) times a day Rinse mouth after use  -     albuterol (ProAir HFA) 90 mcg/act inhaler; Inhale 2 puffs every 6 (six) hours as needed for wheezing  -     albuterol (2 5 mg/3 mL) 0 083 % nebulizer solution; Take 3 mL (2 5 mg total) by nebulization every 6 (six) hours as needed for wheezing or shortness of breath        Patient Instructions     By history, wheezing is improved since 2 days ago but still present  Finish course of prednisone which would be a total of 10 days      Albuterol via handheld inhaler or nebulizer should be every 3 or 4 hours as needed  Begin Advair 250 mg twice daily  After she gets back from [facility], can taper to once a day and take it from there

## 2022-08-29 NOTE — TELEPHONE ENCOUNTER
Regarding: Trouble breathing and wheezing / asthma  ----- Message from Kingsley Bach sent at 8/29/2022  7:40 AM EDT -----  "I have an asthma condition and I had trouble breathing  I spoke with the doctor over the weekend and he told me to call and get an appt   Today "

## 2022-08-29 NOTE — TELEPHONE ENCOUNTER
Patient reports she had an asthma flare up over the weekend and had new orders from her PCP  She was told to follow up in the office today  Appointment made for [phone]  Reason for Disposition   Patient wants to be seen    Answer Assessment - Initial Assessment Questions  1  RESPIRATORY STATUS: "Describe your breathing?" (e g , wheezing, shortness of breath, unable to speak, severe coughing)       Wheezing, coughing, SOB  2  ONSET: "When did this asthma attack begin?"       2 weeks ago   3  TRIGGER: "What do you think triggered this attack?" (e g , URI, exposure to pollen or other allergen, tobacco smoke)       Unaware  4  PEAK EXPIRATORY FLOW RATE (PEFR): "Do you use a peak flow meter?" If Yes, ask: "What's the current peak flow? What's your personal best peak flow?"       N/A  5  SEVERITY: "How bad is this attack?"     - MILD: No SOB at rest, mild SOB with walking, speaks normally in sentences, can lay down, no retractions, pulse < 100  (GREEN Zone: PEFR %)    - MODERATE: SOB at rest, SOB with minimal exertion and prefers to sit, cannot lie down flat, speaks in phrases, mild retractions, audible wheezing, pulse 100-120  (YELLOW Zone: PEFR 50-80%)     - SEVERE: Very SOB at rest, speaks in single words, struggling to breathe, sitting hunched forward, retractions, usually loud wheezing, sometimes minimal wheezing because of decreased air movement, pulse > 120  (RED Zone: PEFR < 50%)  Moderate-Severe  6  MEDICATIONS (Inhaler or nebs): "What are your asthma medications?" and "What treatments have you given so far?"     - Quick-relief: albuterol, metaproterenol, salbutamol, or other inhaled or nebulized beta-agonist medicines    - Long-term-control: steroids, cromolyn, or other anti-inflammatory medicines  Prednisone   7  OTHER SYMPTOMS: "Do you have any other symptoms? (e g , runny nose, chest pain, fever)      Runny nose   8   PREGNANCY: "Is there any chance you are pregnant?" "When was your last menstrual period?"      N/A    Protocols used: ASTHMA ATTACK-ADULT-OH

## 2022-09-09 NOTE — ANESTHESIA POSTPROCEDURE EVALUATION
Post-Op Assessment Note    CV Status:  Stable    Pain management: adequate     Mental Status:  Alert and awake   Hydration Status:  Stable   PONV Controlled:  None   Airway Patency:  Patent      Post Op Vitals Reviewed: Yes      Staff: CRNA         No complications documented      BP      Temp     Pulse    Resp      SpO2 Hydroxyzine Counseling: Patient advised that the medication is sedating and not to drive a car after taking this medication.  Patient informed of potential adverse effects including but not limited to dry mouth, urinary retention, and blurry vision.  The patient verbalized understanding of the proper use and possible adverse effects of hydroxyzine.  All of the patient's questions and concerns were addressed.

## 2022-10-10 ENCOUNTER — RA CDI HCC (OUTPATIENT)
Dept: OTHER | Facility: HOSPITAL | Age: 67
End: 2022-10-10

## 2022-10-10 NOTE — PROGRESS NOTES
[address]  coding opportunities       Chart reviewed, no opportunity found: CHART REVIEWED, NO OPPORTUNITY FOUND        Patients Insurance     Medicare Insurance: Medicare

## 2022-10-18 ENCOUNTER — OFFICE VISIT (OUTPATIENT)
Dept: FAMILY MEDICINE CLINIC | Facility: CLINIC | Age: 67
End: 2022-10-18
Payer: MEDICARE

## 2022-10-18 ENCOUNTER — NURSE TRIAGE (OUTPATIENT)
Dept: OTHER | Facility: OTHER | Age: 67
End: 2022-10-18

## 2022-10-18 VITALS
OXYGEN SATURATION: 96 % | HEIGHT: 64 IN | SYSTOLIC BLOOD PRESSURE: 128 MMHG | DIASTOLIC BLOOD PRESSURE: 62 MMHG | WEIGHT: 172 LBS | TEMPERATURE: 96.7 F | HEART RATE: 80 BPM | BODY MASS INDEX: 29.37 KG/M2

## 2022-10-18 DIAGNOSIS — J45.20 MILD INTERMITTENT ASTHMA WITHOUT COMPLICATION: ICD-10-CM

## 2022-10-18 DIAGNOSIS — J06.9 URI WITH COUGH AND CONGESTION: ICD-10-CM

## 2022-10-18 DIAGNOSIS — J45.21 MILD INTERMITTENT ASTHMA WITH EXACERBATION: Primary | ICD-10-CM

## 2022-10-18 DIAGNOSIS — R05.9 COUGH, UNSPECIFIED TYPE: ICD-10-CM

## 2022-10-18 PROCEDURE — 99214 OFFICE O/P EST MOD 30 MIN: CPT | Performed by: FAMILY MEDICINE

## 2022-10-18 RX ORDER — PREDNISONE 10 MG/1
TABLET ORAL
Qty: 30 TABLET | Refills: 0 | Status: SHIPPED | OUTPATIENT
Start: 2022-10-18

## 2022-10-18 RX ORDER — GUAIFENESIN AND CODEINE PHOSPHATE 100; 10 MG/5ML; MG/5ML
5 SOLUTION ORAL 3 TIMES DAILY PRN
Qty: 120 ML | Refills: 0 | Status: SHIPPED | OUTPATIENT
Start: 2022-10-18 | End: 2022-10-24 | Stop reason: ALTCHOICE

## 2022-10-18 NOTE — PATIENT INSTRUCTIONS
Take steroids as directed  Use cough syrup at bedtime- it does have codeine in it so it will make you sleepy  Use albuterol inhaler as needed  Switch allergy medication to Allegra or Zyrtec  Increase fluids  If worsening or fevers or any other concerns, please call office

## 2022-10-18 NOTE — TELEPHONE ENCOUNTER
Patient started with an asthma attack last night and would like to be seen in the office today  She would like a call back to advise  Reason for Disposition  • Sinus pain (around cheekbone or eye)    Answer Assessment - Initial Assessment Questions  1  RESPIRATORY STATUS: "Describe your breathing?" (e g , wheezing, shortness of breath, unable to speak, severe coughing)       All of the above  2  ONSET: "When did this asthma attack begin?"       10/17/22  3  TRIGGER: "What do you think triggered this attack?" (e g , URI, exposure to pollen or other allergen, tobacco smoke)       Unaware  4  PEAK EXPIRATORY FLOW RATE (PEFR): "Do you use a peak flow meter?" If Yes, ask: "What's the current peak flow? What's your personal best peak flow?"       N/A  5  SEVERITY: "How bad is this attack?"     - MILD: No SOB at rest, mild SOB with walking, speaks normally in sentences, can lay down, no retractions, pulse < 100  (GREEN Zone: PEFR %)    - MODERATE: SOB at rest, SOB with minimal exertion and prefers to sit, cannot lie down flat, speaks in phrases, mild retractions, audible wheezing, pulse 100-120  (YELLOW Zone: PEFR 50-80%)     - SEVERE: Very SOB at rest, speaks in single words, struggling to breathe, sitting hunched forward, retractions, usually loud wheezing, sometimes minimal wheezing because of decreased air movement, pulse > 120  (RED Zone: PEFR < 50%)  Mild-moderate   6  MEDICATIONS (Inhaler or nebs): "What are your asthma medications?" and "What treatments have you given so far?"     - Quick-relief: albuterol, metaproterenol, salbutamol, or other inhaled or nebulized beta-agonist medicines    - Long-term-control: steroids, cromolyn, or other anti-inflammatory medicines  Albuterol   7  OTHER SYMPTOMS: "Do you have any other symptoms? (e g , runny nose, chest pain, fever)      Headache, sinus pressure, ear pressure   8   PREGNANCY: "Is there any chance you are pregnant?" "When was your last menstrual period?"      N/A    Protocols used: ASTHMA ATTACK-ADULT-AH

## 2022-10-20 NOTE — PROGRESS NOTES
Subjective:    HPI  Bebeto Dumont is a 69 y o  female here today for:  Chief Complaint   Patient presents with   • Asthma     ---Above per clinical staff & reviewed   ---  HPI:  76yof here for asthma issues  States she has been having increased congestion, cough, wheezing for a few days  Allergies seem to be bothering her  Discussed changing allergy medication  Encouraged her to drink plenty of water  Will do steroid taper and give her cough medicine  No fevers, shortness of breath, chest pain  Eating and drinking ok     The following portions of the patient's history were reviewed and updated as appropriate: allergies, current medications, past family history, past medical history, past social history, past surgical history and problem list     Past Medical History:   Diagnosis Date   • Age-related osteoporosis without current pathological fracture 02/14/2022   • Anxiety state 12/19/2014   • Benign essential hypertension 01/14/2010   • Chronic rhinitis 1/12/2012   • Depression 2019   • 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 hair loss 2/4/2020   • Fracture of multiple ribs of left side 12/21/2020   • Hyponatremia 10/02/2020   • Mild persistent asthma without complication 3/2/2382   • Right chronic serous otitis media 11/07/2017    Resolved after myringotomy tube   • Scapula fracture 12/21/2020   • Temporal arteritis ([address] ) 09/04/2020    Biopsy negative-treated with prednisone for 1 year       Past Surgical History:   Procedure Laterality Date   • LAPAROSCOPY      X2 for endometriosis   • HI TEMPORAL ARTERY LIGATN OR BX Left 08/14/2020    Procedure: TEMPORAL ARTERY BIOPSY;  Surgeon: Jaydon Pan MD;  Location: [address] [place] [address];  Service: General   • TOTAL KNEE ARTHROPLASTY Right 03/28/2022    Dr Alberto [facility]       Social History     Socioeconomic History   • Marital status: /Civil Union     Spouse name: None   • Number of children: 2   • Years of education: None   • Highest education level: None   Occupational History   • None   Tobacco Use   • Smoking status: Never Smoker   • Smokeless tobacco: Never Used   Vaping Use   • Vaping Use: Never used   Substance and Sexual Activity   • Alcohol use: Yes     Comment: socially   • Drug use: Never   • Sexual activity: Not Currently   Other Topics Concern   • None   Social History Narrative     since 0  Two sons  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, gardening, walking, needle work, painting, writing books  6/22-[place]  and will be moving back home  Social Determinants of Health     Financial Resource Strain: Not on file   Food Insecurity: Not on file   Transportation Needs: Not on file   Physical Activity: Not on file   Stress: Not on file   Social Connections: Not on file   Intimate Partner Violence: Not on file   Housing Stability: Not on file       Current Outpatient Medications   Medication Sig Dispense Refill   • albuterol (2 5 mg/3 mL) 0 083 % nebulizer solution Take 3 mL (2 5 mg total) by nebulization every 6 (six) hours as needed for wheezing or shortness of breath 180 mL 5   • albuterol (ProAir HFA) 90 mcg/act inhaler Inhale 2 puffs every 6 (six) hours as needed for wheezing 8 5 g 5   • alendronate (FOSAMAX) 70 mg tablet TAKE 1 TABLET BY MOUTH ONE TIME PER WEEK 12 tablet 3   • Aluminum & Magnesium Hydroxide (MAGNESIUM-ALUMINUM PO) Take by mouth     • amLODIPine (NORVASC) 5 mg tablet TAKE 1 TABLET BY MOUTH DAILY ON MONDAYS, WEDNESDAYS, AND FRIDAYS AND 1/2 TABLET ALL OTHER DAYS  (Patient taking differently: Take by mouth daily Take 1/2 tab daily) 64 tablet 5   • benzonatate (TESSALON) 200 MG capsule Take 1 capsule (200 mg total) by mouth 3 (three) times a day as needed for cough 20 capsule 0   • budesonide-formoterol (Symbicort) 80-4 5 MCG/ACT inhaler Inhale 2 puffs 2 (two) times a day Rinse mouth after use   10 2 g 5   • Diclofenac Sodium (VOLTAREN) 1 % Apply 4 g topically 4 (four) times a day 500 g 5   • fluticasone (FLONASE) 50 mcg/act nasal spray SPRAY 1 SPRAY INTO EACH NOSTRIL EVERY DAY 48 mL 3   • Fluticasone-Salmeterol (Advair Diskus) 250-50 mcg/dose inhaler Inhale 1 puff 2 (two) times a day Rinse mouth after use  60 blister 5   • guaifenesin-codeine (GUAIFENESIN AC) 100-10 MG/5ML liquid Take 5 mL by mouth 3 (three) times a day as needed for cough or congestion 120 mL 0   • loratadine (CLARITIN) 10 mg tablet Take 1 tablet (10 mg total) by mouth daily 30 tablet 0   • meloxicam (MOBIC) 7 5 mg tablet TAKE 1 TABLET BY MOUTH EVERY DAY 90 tablet 3   • metoprolol succinate (TOPROL-XL) 100 mg 24 hr tablet TAKE 1 TABLET BY MOUTH EVERY DAY 90 tablet 3   • olmesartan (BENICAR) 20 mg tablet TAKE 1 TABLET BY MOUTH EVERY DAY 90 tablet 3   • Omega-3 1000 MG CAPS Take by mouth     • predniSONE 10 mg tablet Take 5 tablets for 2 days, 4 tablets for 2 days, 3 for 2 days, 2 for 2 days, 1 for 2 days 30 tablet 0   • spironolactone (ALDACTONE) 50 mg tablet TAKE 1 TABLET BY MOUTH EVERY DAY 90 tablet 3   • triamcinolone (KENALOG) 0 147 MG/GM topical spray Apply topically 2 (two) times a day 63 g 5     No current facility-administered medications for this visit  Review of Systems   Constitutional: Negative  Negative for chills and fever  HENT: Positive for congestion and rhinorrhea  Negative for ear pain and sore throat  Eyes: Negative for pain and visual disturbance  Respiratory: Positive for cough and wheezing  Negative for shortness of breath  Cardiovascular: Negative  Negative for chest pain and palpitations  Gastrointestinal: Negative  Negative for abdominal pain and vomiting  Genitourinary: Negative  Negative for dysuria and hematuria  Musculoskeletal: Negative for arthralgias and back pain  Skin: Negative for color change and rash  Neurological: Negative  Negative for seizures and syncope  Psychiatric/Behavioral: Positive for sleep disturbance  All other systems reviewed and are negative         Objective: /62 (BP Location: Left arm, Patient Position: Sitting, Cuff Size: Adult)   Pulse 80   Temp (!) 96 7 °F (35 9 °C) (Temporal)   Ht 5' 3 5" (1 613 m)   Wt 78 kg (172 lb)   SpO2 96%   BMI 29 99 kg/m²   Wt Readings from Last 3 Encounters:   10/18/22 78 kg (172 lb)   08/29/22 77 2 kg (170 lb 3 2 oz)   08/19/22 78 kg (171 lb 15 3 oz)     BP Readings from Last 3 Encounters:   10/18/22 128/62   08/29/22 96/60   08/19/22 137/66       Lab Results   Component Value Date    WBC 9 01 12/22/2020    HGB 12 4 12/22/2020    HCT 36 2 12/22/2020     12/22/2020    ALT 25 07/08/2021    AST 14 07/08/2021    K 4 5 07/08/2021    CL 95 (L) 07/08/2021    CREATININE 0 68 07/08/2021    BUN 13 07/08/2021    CO2 27 07/08/2021    INR 0 97 12/21/2020    GLUF 81 07/08/2021       Physical Exam  Vitals and nursing note reviewed  Constitutional:       Appearance: Normal appearance  She is well-developed  HENT:      Head: Normocephalic and atraumatic  Eyes:      Pupils: Pupils are equal, round, and reactive to light  Cardiovascular:      Rate and Rhythm: Normal rate and regular rhythm  Heart sounds: No murmur heard  Pulmonary:      Effort: Pulmonary effort is normal  No respiratory distress  Breath sounds: Wheezing present  Comments: Occasional expiratory wheeze, good airflow  Musculoskeletal:      Cervical back: Normal range of motion and neck supple  Skin:     General: Skin is warm  Capillary Refill: Capillary refill takes less than 2 seconds  Neurological:      Mental Status: She is alert and oriented to person, place, and time  Cranial Nerves: No cranial nerve deficit  Psychiatric:         Mood and Affect: Mood normal          Thought Content: Thought content normal                        Assessment/Plan:   Everton Rebollar was seen today for asthma  Diagnoses and all orders for this visit:    Mild intermittent asthma with exacerbation  -     predniSONE 10 mg tablet;  Take 5 tablets for 2 days, 4 tablets for 2 days, 3 for 2 days, 2 for 2 days, 1 for 2 days  -     guaifenesin-codeine (GUAIFENESIN AC) 100-10 MG/5ML liquid; Take 5 mL by mouth 3 (three) times a day as needed for cough or congestion    Mild intermittent asthma without complication  -     predniSONE 10 mg tablet; Take 5 tablets for 2 days, 4 tablets for 2 days, 3 for 2 days, 2 for 2 days, 1 for 2 days  -     guaifenesin-codeine (GUAIFENESIN AC) 100-10 MG/5ML liquid; Take 5 mL by mouth 3 (three) times a day as needed for cough or congestion    Cough, unspecified type  -     predniSONE 10 mg tablet; Take 5 tablets for 2 days, 4 tablets for 2 days, 3 for 2 days, 2 for 2 days, 1 for 2 days  -     guaifenesin-codeine (GUAIFENESIN AC) 100-10 MG/5ML liquid; Take 5 mL by mouth 3 (three) times a day as needed for cough or congestion    URI with cough and congestion  -     predniSONE 10 mg tablet; Take 5 tablets for 2 days, 4 tablets for 2 days, 3 for 2 days, 2 for 2 days, 1 for 2 days  -     guaifenesin-codeine (GUAIFENESIN AC) 100-10 MG/5ML liquid; Take 5 mL by mouth 3 (three) times a day as needed for cough or congestion      Return if symptoms worsen or fail to improve, for Recheck  Patient Instructions   Take steroids as directed  Use cough syrup at bedtime- it does have codeine in it so it will make you sleepy  Use albuterol inhaler as needed  Switch allergy medication to Allegra or Zyrtec  Increase fluids  If worsening or fevers or any other concerns, please call office

## 2022-10-24 ENCOUNTER — OFFICE VISIT (OUTPATIENT)
Dept: FAMILY MEDICINE CLINIC | Facility: CLINIC | Age: 67
End: 2022-10-24
Payer: MEDICARE

## 2022-10-24 VITALS
HEIGHT: 64 IN | OXYGEN SATURATION: 97 % | TEMPERATURE: 97.6 F | WEIGHT: 174.6 LBS | SYSTOLIC BLOOD PRESSURE: 134 MMHG | DIASTOLIC BLOOD PRESSURE: 68 MMHG | BODY MASS INDEX: 29.81 KG/M2 | HEART RATE: 67 BPM

## 2022-10-24 DIAGNOSIS — Z12.11 SCREENING FOR COLON CANCER: ICD-10-CM

## 2022-10-24 DIAGNOSIS — Z12.31 ENCOUNTER FOR SCREENING MAMMOGRAM FOR MALIGNANT NEOPLASM OF BREAST: ICD-10-CM

## 2022-10-24 DIAGNOSIS — Z23 FLU VACCINE NEED: ICD-10-CM

## 2022-10-24 DIAGNOSIS — M81.0 AGE-RELATED OSTEOPOROSIS WITHOUT CURRENT PATHOLOGICAL FRACTURE: ICD-10-CM

## 2022-10-24 DIAGNOSIS — I10 BENIGN ESSENTIAL HYPERTENSION: Primary | ICD-10-CM

## 2022-10-24 DIAGNOSIS — J45.20 MILD INTERMITTENT ASTHMA WITHOUT COMPLICATION: ICD-10-CM

## 2022-10-24 DIAGNOSIS — F32.5 MAJOR DEPRESSIVE DISORDER WITH SINGLE EPISODE, IN FULL REMISSION (HCC): ICD-10-CM

## 2022-10-24 PROBLEM — M31.6 TEMPORAL ARTERITIS (HCC): Status: RESOLVED | Noted: 2020-09-04 | Resolved: 2022-10-24

## 2022-10-24 PROCEDURE — 99214 OFFICE O/P EST MOD 30 MIN: CPT | Performed by: FAMILY MEDICINE

## 2022-10-24 PROCEDURE — 90662 IIV NO PRSV INCREASED AG IM: CPT

## 2022-10-24 PROCEDURE — G0008 ADMIN INFLUENZA VIRUS VAC: HCPCS

## 2022-10-24 RX ORDER — ALENDRONATE SODIUM 70 MG/1
TABLET ORAL
Qty: 13 TABLET | Refills: 3 | Status: SHIPPED | OUTPATIENT
Start: 2022-10-24
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
## 2022-10-30 DIAGNOSIS — I10 BENIGN ESSENTIAL HYPERTENSION: ICD-10-CM

## 2022-10-31 RX ORDER — AMLODIPINE BESYLATE 5 MG/1
TABLET ORAL
Qty: 64 TABLET | Refills: 5 | Status: SHIPPED | OUTPATIENT
Start: 2022-10-31 | End: 2022-11-04

## 2022-11-04 DIAGNOSIS — I10 BENIGN ESSENTIAL HYPERTENSION: ICD-10-CM

## 2022-11-04 RX ORDER — AMLODIPINE BESYLATE 5 MG/1
TABLET ORAL
Qty: 192 TABLET | Refills: 2 | Status: SHIPPED | OUTPATIENT
Start: 2022-11-04

## 2022-11-19 LAB — COLOGUARD RESULT REPORTABLE: POSITIVE

## 2022-11-21 ENCOUNTER — PREP FOR PROCEDURE (OUTPATIENT)
Dept: GASTROENTEROLOGY | Facility: CLINIC | Age: 67
End: 2022-11-21

## 2022-11-21 DIAGNOSIS — R19.5 POSITIVE COLORECTAL CANCER SCREENING USING COLOGUARD TEST: Primary | ICD-10-CM

## 2023-01-03 RX ORDER — SODIUM CHLORIDE 9 MG/ML
125 INJECTION, SOLUTION INTRAVENOUS CONTINUOUS
Status: CANCELLED | OUTPATIENT
Start: 2023-01-03

## 2023-01-04 ENCOUNTER — HOSPITAL ENCOUNTER (OUTPATIENT)
Dept: GASTROENTEROLOGY | Facility: MEDICAL CENTER | Age: 68
Setting detail: OUTPATIENT SURGERY
Discharge: HOME | End: 2023-01-04

## 2023-01-04 ENCOUNTER — ANESTHESIA (OUTPATIENT)
Dept: GASTROENTEROLOGY | Facility: MEDICAL CENTER | Age: 68
End: 2023-01-04

## 2023-01-04 ENCOUNTER — ANESTHESIA EVENT (OUTPATIENT)
Dept: GASTROENTEROLOGY | Facility: MEDICAL CENTER | Age: 68
End: 2023-01-04

## 2023-01-04 VITALS
WEIGHT: 165 LBS | RESPIRATION RATE: 18 BRPM | BODY MASS INDEX: 29.23 KG/M2 | HEART RATE: 58 BPM | HEIGHT: 63 IN | SYSTOLIC BLOOD PRESSURE: 112 MMHG | OXYGEN SATURATION: 98 % | DIASTOLIC BLOOD PRESSURE: 72 MMHG | TEMPERATURE: 97.8 F

## 2023-01-04 DIAGNOSIS — R19.5 POSITIVE COLORECTAL CANCER SCREENING USING COLOGUARD TEST: ICD-10-CM

## 2023-01-04 PROBLEM — Z86.0100 PERSONAL HISTORY OF COLONIC POLYPS: Status: ACTIVE | Noted: 2023-01-04

## 2023-01-04 PROBLEM — Z86.010 PERSONAL HISTORY OF COLONIC POLYPS: Status: ACTIVE | Noted: 2023-01-04

## 2023-01-04 RX ORDER — PROPOFOL 10 MG/ML
INJECTION, EMULSION INTRAVENOUS AS NEEDED
Status: DISCONTINUED | OUTPATIENT
Start: 2023-01-04 | End: 2023-01-04

## 2023-01-04 RX ORDER — SODIUM CHLORIDE 9 MG/ML
125 INJECTION, SOLUTION INTRAVENOUS CONTINUOUS
Status: DISCONTINUED | OUTPATIENT
Start: 2023-01-04 | End: 2023-01-08 | Stop reason: HOSPADM

## 2023-01-04 RX ADMIN — PROPOFOL 50 MG: 10 INJECTION, EMULSION INTRAVENOUS at 08:56

## 2023-01-04 RX ADMIN — PROPOFOL 50 MG: 10 INJECTION, EMULSION INTRAVENOUS at 09:03

## 2023-01-04 RX ADMIN — PROPOFOL 50 MG: 10 INJECTION, EMULSION INTRAVENOUS at 08:52

## 2023-01-04 RX ADMIN — PROPOFOL 50 MG: 10 INJECTION, EMULSION INTRAVENOUS at 08:48

## 2023-01-04 RX ADMIN — SODIUM CHLORIDE 125 ML/HR: 0.9 INJECTION, SOLUTION INTRAVENOUS at 08:17

## 2023-01-04 RX ADMIN — PROPOFOL 50 MG: 10 INJECTION, EMULSION INTRAVENOUS at 09:10

## 2023-01-04 RX ADMIN — PROPOFOL 100 MG: 10 INJECTION, EMULSION INTRAVENOUS at 08:45

## 2023-01-04 NOTE — ANESTHESIA POSTPROCEDURE EVALUATION
Post-Op Assessment Note    CV Status:  Stable  Pain Score: 0    Pain management: adequate     Mental Status:  Alert and awake   Hydration Status:  Euvolemic and stable   PONV Controlled:  Controlled   Airway Patency:  Patent      Post Op Vitals Reviewed: Yes      Staff: Anesthesiologist         No notable events documented      BP      Temp      Pulse     Resp      SpO2      /72   Pulse 58   Temp 97 8 °F (36 6 °C) (Temporal)   Resp 18   Ht 5' 3" (1 6 m)   Wt 74 8 kg (165 lb)   SpO2 98%   BMI 29 23 kg/m²

## 2023-01-04 NOTE — ANESTHESIA PREPROCEDURE EVALUATION
Procedure:  COLONOSCOPY    Relevant Problems   CARDIO   (+) Benign essential hypertension      MUSCULOSKELETAL   (+) Primary osteoarthritis of right knee      NEURO/PSYCH   (+) Anxiety state   (+) Major depressive disorder with single episode, in full remission (HCC)      PULMONARY   (+) Mild intermittent asthma without complication      Other   (+) Alcohol use disorder, mild, abuse        Physical Exam    Airway    Mallampati score: II  TM Distance: >3 FB  Neck ROM: full     Dental       Cardiovascular  Rhythm: regular, Rate: normal, Cardiovascular exam normal    Pulmonary  Pulmonary exam normal Breath sounds clear to auscultation,     Other Findings        Anesthesia Plan  ASA Score- 3     Anesthesia Type- IV sedation with anesthesia with ASA Monitors  Additional Monitors:   Airway Plan:           Plan Factors-Exercise tolerance (METS): >4 METS  Chart reviewed  Existing labs reviewed  Patient is not a current smoker  Obstructive sleep apnea risk education given perioperatively  Induction- intravenous  Postoperative Plan-     Informed Consent- Anesthetic plan and risks discussed with patient

## 2023-01-04 NOTE — PERIOPERATIVE NURSING NOTE
0 5 cc methylene blue in 450 cc water injected for lift by physician for cecal polyp prior to removal under direction of Dr Joan Shaw

## 2023-01-04 NOTE — H&P
H&P EXAM - Outpatient Endoscopy   Hussain Velasquez 69 y o  female MRN: [id number]    [address] [facility]   Encounter: [id number]        History and Physical - [facility]  Hussain Velasquez 69 y o  female MRN: [id number]                  HPI: Hussain Velasquez is a 69y o  year old female who presents for positive cologuard      REVIEW OF SYSTEMS: Per the HPI, and otherwise unremarkable      Historical Information   Past Medical History:   Diagnosis Date   • Age-related osteoporosis without current pathological fracture 02/14/2022   • Anxiety state 12/19/2014   • Benign essential hypertension 01/14/2010   • Chronic rhinitis 1/12/2012   • Depression 2019   • 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 hair loss 2/4/2020   • Fracture of multiple ribs of left side 12/21/2020   • Hyponatremia 10/02/2020   • Mild persistent asthma without complication 4/6/8611   • Right chronic serous otitis media 11/07/2017    Resolved after myringotomy tube   • Scapula fracture 12/21/2020   • Temporal arteritis ([facility] [place] [address] ) 09/04/2020    Biopsy negative-treated with prednisone for 1 year     Past Surgical History:   Procedure Laterality Date   • COLONOSCOPY     • LAPAROSCOPY      X2 for endometriosis   • MN LIGATION/BIOPSY TEMPORAL ARTERY Left 08/14/2020    Procedure: TEMPORAL ARTERY BIOPSY;  Surgeon: Rubia Barirga MD;  Location: [facility] MAIN OR;  Service: General   • TOTAL KNEE ARTHROPLASTY Right 03/28/2022    Dr Suzette Encarnacion History   Social History     Substance and Sexual Activity   Alcohol Use Yes    Comment: socially     Social History     Substance and Sexual Activity   Drug Use Never     Social History     Tobacco Use   Smoking Status Never   Smokeless Tobacco Never     Family History   Problem Relation Age of Onset   • Coronary artery disease Mother    • Coronary artery disease Father    • No Known Problems Maternal Grandmother    • No Known Problems Maternal Grandfather    • Leukemia Paternal Grandmother 71   • No Known Problems Paternal Grandfather    • No Known Problems Son    • No Known Problems Son        Meds/Allergies     (Not in a hospital admission)      No Known Allergies    Objective     /62   Pulse 62   Temp 97 8 °F (36 6 °C) (Temporal)   Resp 16   Ht 5' 3" (1 6 m)   Wt 74 8 kg (165 lb)   SpO2 96%   BMI 29 23 kg/m²       PHYSICAL EXAM    Gen: NAD  CV: RRR  CHEST: Clear  ABD: soft, NT/ND  EXT: no edema      ASSESSMENT/PLAN:  This is a 69y o  year old female here for colonoscopy, and she is stable and optimized for her procedure

## 2023-01-07 ENCOUNTER — APPOINTMENT (EMERGENCY)
Dept: CT IMAGING | Facility: HOSPITAL | Age: 68
End: 2023-01-07

## 2023-01-07 ENCOUNTER — HOSPITAL ENCOUNTER (EMERGENCY)
Facility: HOSPITAL | Age: 68
Discharge: HOME | End: 2023-01-07
Attending: EMERGENCY MEDICINE

## 2023-01-07 VITALS
RESPIRATION RATE: 16 BRPM | WEIGHT: 172.62 LBS | BODY MASS INDEX: 30.58 KG/M2 | DIASTOLIC BLOOD PRESSURE: 67 MMHG | HEART RATE: 64 BPM | SYSTOLIC BLOOD PRESSURE: 149 MMHG | OXYGEN SATURATION: 97 % | TEMPERATURE: 98.1 F

## 2023-01-07 DIAGNOSIS — K57.32 SIGMOID DIVERTICULITIS: Primary | ICD-10-CM

## 2023-01-07 DIAGNOSIS — R10.30 LOWER ABDOMINAL PAIN: ICD-10-CM

## 2023-01-07 LAB
ALBUMIN SERPL BCP-MCNC: 3.8 G/DL (ref 3.5–5)
ALP SERPL-CCNC: 48 U/L (ref 46–116)
ALT SERPL W P-5'-P-CCNC: 17 U/L (ref 12–78)
ANION GAP SERPL CALCULATED.3IONS-SCNC: 11 MMOL/L (ref 4–13)
AST SERPL W P-5'-P-CCNC: 21 U/L (ref 5–45)
BACTERIA UR QL AUTO: ABNORMAL /HPF
BASOPHILS # BLD AUTO: 0.05 THOUSANDS/ÂΜL (ref 0–0.1)
BASOPHILS NFR BLD AUTO: 0 % (ref 0–1)
BILIRUB SERPL-MCNC: 0.41 MG/DL (ref 0.2–1)
BILIRUB UR QL STRIP: NEGATIVE
BUN SERPL-MCNC: 8 MG/DL (ref 5–25)
CALCIUM SERPL-MCNC: 9.6 MG/DL (ref 8.3–10.1)
CHLORIDE SERPL-SCNC: 94 MMOL/L (ref 96–108)
CLARITY UR: CLEAR
CO2 SERPL-SCNC: 23 MMOL/L (ref 21–32)
COLOR UR: YELLOW
CREAT SERPL-MCNC: 0.62 MG/DL (ref 0.6–1.3)
EOSINOPHIL # BLD AUTO: 0.17 THOUSAND/ÂΜL (ref 0–0.61)
EOSINOPHIL NFR BLD AUTO: 1 % (ref 0–6)
ERYTHROCYTE [DISTWIDTH] IN BLOOD BY AUTOMATED COUNT: 12.4 % (ref 11.6–15.1)
GFR SERPL CREATININE-BSD FRML MDRD: 92 ML/MIN/1.73SQ M
GLUCOSE SERPL-MCNC: 102 MG/DL (ref 65–140)
GLUCOSE UR STRIP-MCNC: NEGATIVE MG/DL
HCT VFR BLD AUTO: 40.5 % (ref 34.8–46.1)
HGB BLD-MCNC: 13.7 G/DL (ref 11.5–15.4)
HGB UR QL STRIP.AUTO: ABNORMAL
IMM GRANULOCYTES # BLD AUTO: 0.06 THOUSAND/UL (ref 0–0.2)
IMM GRANULOCYTES NFR BLD AUTO: 0 % (ref 0–2)
KETONES UR STRIP-MCNC: ABNORMAL MG/DL
LEUKOCYTE ESTERASE UR QL STRIP: ABNORMAL
LYMPHOCYTES # BLD AUTO: 1.52 THOUSANDS/ÂΜL (ref 0.6–4.47)
LYMPHOCYTES NFR BLD AUTO: 10 % (ref 14–44)
MCH RBC QN AUTO: 32.6 PG (ref 26.8–34.3)
MCHC RBC AUTO-ENTMCNC: 33.8 G/DL (ref 31.4–37.4)
MCV RBC AUTO: 96 FL (ref 82–98)
MONOCYTES # BLD AUTO: 1.43 THOUSAND/ÂΜL (ref 0.17–1.22)
MONOCYTES NFR BLD AUTO: 10 % (ref 4–12)
NEUTROPHILS # BLD AUTO: 11.33 THOUSANDS/ÂΜL (ref 1.85–7.62)
NEUTS SEG NFR BLD AUTO: 79 % (ref 43–75)
NITRITE UR QL STRIP: NEGATIVE
NON-SQ EPI CELLS URNS QL MICRO: ABNORMAL /HPF
NRBC BLD AUTO-RTO: 0 /100 WBCS
PH UR STRIP.AUTO: 5.5 [PH] (ref 4.5–8)
PLATELET # BLD AUTO: 330 THOUSANDS/UL (ref 149–390)
PMV BLD AUTO: 8.7 FL (ref 8.9–12.7)
POTASSIUM SERPL-SCNC: 4.7 MMOL/L (ref 3.5–5.3)
PROT SERPL-MCNC: 7.6 G/DL (ref 6.4–8.4)
PROT UR STRIP-MCNC: NEGATIVE MG/DL
RBC # BLD AUTO: 4.2 MILLION/UL (ref 3.81–5.12)
RBC #/AREA URNS AUTO: ABNORMAL /HPF
SODIUM SERPL-SCNC: 128 MMOL/L (ref 135–147)
SP GR UR STRIP.AUTO: 1.02 (ref 1–1.03)
UROBILINOGEN UR QL STRIP.AUTO: 0.2 E.U./DL
WBC # BLD AUTO: 14.56 THOUSAND/UL (ref 4.31–10.16)
WBC #/AREA URNS AUTO: ABNORMAL /HPF

## 2023-01-07 RX ORDER — KETOROLAC TROMETHAMINE 30 MG/ML
30 INJECTION, SOLUTION INTRAMUSCULAR; INTRAVENOUS ONCE
Status: COMPLETED | OUTPATIENT
Start: 2023-01-07 | End: 2023-01-07

## 2023-01-07 RX ORDER — AMOXICILLIN AND CLAVULANATE POTASSIUM 875; 125 MG/1; MG/1
1 TABLET, FILM COATED ORAL EVERY 12 HOURS
Qty: 14 TABLET | Refills: 0 | Status: SHIPPED | OUTPATIENT
Start: 2023-01-07 | End: 2023-01-14

## 2023-01-07 RX ADMIN — IOHEXOL 100 ML: 350 INJECTION, SOLUTION INTRAVENOUS at 15:06

## 2023-01-07 RX ADMIN — KETOROLAC TROMETHAMINE 30 MG: 30 INJECTION, SOLUTION INTRAMUSCULAR; INTRAVENOUS at 13:41

## 2023-01-07 RX ADMIN — SODIUM CHLORIDE 1000 ML: 0.9 INJECTION, SOLUTION INTRAVENOUS at 13:41

## 2023-01-07 NOTE — ED PROVIDER NOTES
History  Chief Complaint   Patient presents with   • Abdominal Pain     Pt had colonoscopy on Wednesday  Pt presents with abdominal pain, body aches, "low grade fever", and chills  49-year-old female with relevant past medical history significant for hyponatremia, hypertension, anxiety and depression who presents to the emergency department accompanied by  at bedside for complaint of lower abdominal pain and chills starting this morning upon waking  Patient had uncomplicated colonoscopy on Wednesday, some polyps removed, was feeling fine until this morning  Reports low grade fever starting this morning for which she took tylenol  Describes lower abdominal pain as cramping, non radiating, constant, not improved or exacerbated by anything in particular, feeling of having a gas pocket and having to pass gas but unable to, is passing non bloody small caliber stools  Denies nausea/vomiting, diarrhea, back pain  No recent sick contacts  Surgical history is significant for laparoscopy and ligation  Prior to Admission Medications   Prescriptions Last Dose Informant Patient Reported? Taking? Aluminum & Magnesium Hydroxide (MAGNESIUM-ALUMINUM PO)   Yes No   Sig: Take by mouth   Diclofenac Sodium (VOLTAREN) 1 %   No No   Sig: Apply 4 g topically 4 (four) times a day   Fluticasone-Salmeterol (Advair Diskus) 250-50 mcg/dose inhaler   No No   Sig: Inhale 1 puff 2 (two) times a day Rinse mouth after use     Omega-3 1000 MG CAPS   Yes No   Sig: Take by mouth   albuterol (2 5 mg/3 mL) 0 083 % nebulizer solution   No No   Sig: Take 3 mL (2 5 mg total) by nebulization every 6 (six) hours as needed for wheezing or shortness of breath   albuterol (ProAir HFA) 90 mcg/act inhaler   No No   Sig: Inhale 2 puffs every 6 (six) hours as needed for wheezing   alendronate (FOSAMAX) 70 mg tablet   No No   Si tab weekly on empty stomach in the upright position   amLODIPine (NORVASC) 5 mg tablet   No No   Sig: TAKE 1 TABLET BY MOUTH DAILY ON MONDAYS, WEDNESDAYS, AND FRIDAYS AND 1/2 TABLET ALL OTHER DAYS  budesonide-formoterol (Symbicort) 80-4 5 MCG/ACT inhaler   No No   Sig: Inhale 2 puffs 2 (two) times a day Rinse mouth after use  fluticasone (FLONASE) 50 mcg/act nasal spray   No No   Sig: SPRAY 1 SPRAY INTO EACH NOSTRIL EVERY DAY   loratadine (CLARITIN) 10 mg tablet   No No   Sig: Take 1 tablet (10 mg total) by mouth daily   meloxicam (MOBIC) 7 5 mg tablet   No No   Sig: TAKE 1 TABLET BY MOUTH EVERY DAY   metoprolol succinate (TOPROL-XL) 100 mg 24 hr tablet   No No   Sig: TAKE 1 TABLET BY MOUTH EVERY DAY   olmesartan (BENICAR) 20 mg tablet   No No   Sig: TAKE 1 TABLET BY MOUTH EVERY DAY   predniSONE 10 mg tablet   No No   Sig: Take 5 tablets for 2 days, 4 tablets for 2 days, 3 for 2 days, 2 for 2 days, 1 for 2 days   spironolactone (ALDACTONE) 50 mg tablet   No No   Sig: TAKE 1 TABLET BY MOUTH EVERY DAY   triamcinolone (KENALOG) 0 147 MG/GM topical spray   No No   Sig: Apply topically 2 (two) times a day      Facility-Administered Medications: None       Past Medical History:   Diagnosis Date   • Age-related osteoporosis without current pathological fracture 02/14/2022   • Anxiety state 12/19/2014   • Benign essential hypertension 01/14/2010   • Chronic rhinitis 1/12/2012   • Depression 2019   • 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 hair loss 2/4/2020   • Fracture of multiple ribs of left side 12/21/2020   • Hyponatremia 10/02/2020   • Mild persistent asthma without complication 2/3/6673   • Personal history of colonic polyps 1/4/2023    Multiple polyps removed 1/4/2023  Recall 3 years     • Right chronic serous otitis media 11/07/2017    Resolved after myringotomy tube   • Scapula fracture 12/21/2020   • Temporal arteritis ([facility] [address] ) 09/04/2020    Biopsy negative-treated with prednisone for 1 year       Past Surgical History:   Procedure Laterality Date   • COLONOSCOPY     • LAPAROSCOPY      X2 for endometriosis   • OH LIGATION/BIOPSY TEMPORAL ARTERY Left 08/14/2020    Procedure: TEMPORAL ARTERY BIOPSY;  Surgeon: Joie Beavers MD;  Location: [address] [place] [address];  Service: General   • TOTAL KNEE ARTHROPLASTY Right 03/28/2022    Dr Suzette Ibanez       Family History   Problem Relation Age of Onset   • Coronary artery disease Mother    • Coronary artery disease Father    • No Known Problems Maternal Grandmother    • No Known Problems Maternal Grandfather    • Leukemia Paternal Grandmother 71   • No Known Problems Paternal Grandfather    • No Known Problems Son    • No Known Problems Son      I have reviewed and agree with the history as documented  E-Cigarette/Vaping   • E-Cigarette Use Never User      E-Cigarette/Vaping Substances   • Nicotine No    • THC No    • CBD No    • Flavoring No    • Other No    • Unknown No      Social History     Tobacco Use   • Smoking status: Never   • Smokeless tobacco: Never   Vaping Use   • Vaping Use: Never used   Substance Use Topics   • Alcohol use: Yes     Comment: socially   • Drug use: Never       Review of Systems   Constitutional: Positive for chills and fever  Negative for activity change, appetite change and fatigue  Respiratory: Negative for shortness of breath  Cardiovascular: Negative for chest pain  Gastrointestinal: Positive for abdominal pain  Negative for abdominal distention, blood in stool, constipation, diarrhea, nausea and vomiting  Genitourinary: Negative for decreased urine volume, difficulty urinating, dysuria, flank pain, frequency, hematuria, pelvic pain, urgency, vaginal bleeding and vaginal discharge  Musculoskeletal: Positive for myalgias  Negative for back pain  Skin: Negative for color change and rash  Neurological: Negative for dizziness and weakness  Hematological: Negative for adenopathy  All other systems reviewed and are negative  Physical Exam  Physical Exam  Vitals reviewed  Constitutional:       General: She is awake  She is not in acute distress       Appearance: Normal appearance  She is well-developed  She is not ill-appearing or toxic-appearing  HENT:      Head: Normocephalic and atraumatic  Mouth/Throat:      Lips: Pink  Mouth: Mucous membranes are moist       Pharynx: Oropharynx is clear  Uvula midline  Eyes:      Extraocular Movements: Extraocular movements intact  Conjunctiva/sclera: Conjunctivae normal       Pupils: Pupils are equal, round, and reactive to light  Cardiovascular:      Rate and Rhythm: Normal rate and regular rhythm  Pulses: Normal pulses  Pulmonary:      Effort: Pulmonary effort is normal       Breath sounds: Normal breath sounds and air entry  Chest:      Chest wall: No tenderness  Abdominal:      General: Bowel sounds are normal  There is no distension  Palpations: Abdomen is soft  There is no hepatomegaly, splenomegaly or mass  Tenderness: There is abdominal tenderness in the right lower quadrant, suprapubic area and left lower quadrant  There is no guarding or rebound  Hernia: No hernia is present  Musculoskeletal:         General: Normal range of motion  Cervical back: Full passive range of motion without pain, normal range of motion and neck supple  Skin:     General: Skin is warm  Capillary Refill: Capillary refill takes less than 2 seconds  Findings: No erythema, lesion or rash  Neurological:      Mental Status: She is alert and oriented to person, place, and time  Psychiatric:         Behavior: Behavior is cooperative           Vital Signs  ED Triage Vitals   Temperature Pulse Respirations Blood Pressure SpO2   01/07/23 1239 01/07/23 1239 01/07/23 1239 01/07/23 1239 01/07/23 1239   98 1 °F (36 7 °C) 64 16 149/67 97 %      Temp Source Heart Rate Source Patient Position - Orthostatic VS BP Location FiO2 (%)   01/07/23 1239 01/07/23 1239 01/07/23 1239 01/07/23 1239 --   Oral Monitor Sitting Right arm       Pain Score       01/07/23 1341       3           Vitals:    01/07/23 1239   BP: 149/67   Pulse: 64   Patient Position - Orthostatic VS: Sitting         Visual Acuity      ED Medications  Medications   sodium chloride 0 9 % bolus 1,000 mL (0 mL Intravenous Stopped 1/7/23 1441)   ketorolac (TORADOL) injection 30 mg (30 mg Intravenous Given 1/7/23 1341)   iohexol (OMNIPAQUE) 350 MG/ML injection (SINGLE-DOSE) 100 mL (100 mL Intravenous Given 1/7/23 1506)       Diagnostic Studies  Results Reviewed     Procedure Component Value Units Date/Time    Urine Microscopic [[id number]]  (Abnormal) Collected: 01/07/23 1521    Lab Status: Final result Specimen: Urine, Clean Catch Updated: 01/07/23 1606     RBC, UA None Seen /hpf      WBC, UA 1-2 /hpf      Epithelial Cells None Seen /hpf      Bacteria, UA Occasional /hpf     Urine Macroscopic, POC [[id number]]  (Abnormal) Collected: 01/07/23 1521    Lab Status: Final result Specimen: Urine Updated: 01/07/23 1523     Color, UA Yellow     Clarity, UA Clear     pH, UA 5 5     Leukocytes, UA Trace     Nitrite, UA Negative     Protein, UA Negative mg/dl      Glucose, UA Negative mg/dl      Ketones, UA Trace mg/dl      Urobilinogen, UA 0 2 E U /dl      Bilirubin, UA Negative     Occult Blood, UA Small     Specific [place], UA 1 020    Narrative:      CLINITEK RESULT    Comprehensive metabolic panel [[id number]]  (Abnormal) Collected: 01/07/23 1322    Lab Status: Final result Specimen: Blood from Arm, Right Updated: 01/07/23 1407     Sodium 128 mmol/L      Potassium 4 7 mmol/L      Chloride 94 mmol/L      CO2 23 mmol/L      ANION GAP 11 mmol/L      BUN 8 mg/dL      Creatinine 0 62 mg/dL      Glucose 102 mg/dL      Calcium 9 6 mg/dL      AST 21 U/L      ALT 17 U/L      Alkaline Phosphatase 48 U/L      Total Protein 7 6 g/dL      Albumin 3 8 g/dL      Total Bilirubin 0 41 mg/dL      eGFR 92 ml/min/1 73sq m     Narrative:      [place] guidelines for Chronic Kidney Disease (CKD):   •  Stage 1 with normal or high GFR (GFR > 90 mL/min/1 73 square meters)  •  Stage 2 Mild CKD (GFR = 60-89 mL/min/1 73 square meters)  •  Stage 3A Moderate CKD (GFR = 45-59 mL/min/1 73 square meters)  •  Stage 3B Moderate CKD (GFR = 30-44 mL/min/1 73 square meters)  •  Stage 4 Severe CKD (GFR = 15-29 mL/min/1 73 square meters)  •  Stage 5 End Stage CKD (GFR <15 mL/min/1 73 square meters)  Note: GFR calculation is accurate only with a steady state creatinine    CBC and differential [[id number]]  (Abnormal) Collected: 01/07/23 1341    Lab Status: Final result Specimen: Blood from Arm, Right Updated: 01/07/23 1349     WBC 14 56 Thousand/uL      RBC 4 20 Million/uL      Hemoglobin 13 7 g/dL      Hematocrit 40 5 %      MCV 96 fL      MCH 32 6 pg      MCHC 33 8 g/dL      RDW 12 4 %      MPV 8 7 fL      Platelets 115 Thousands/uL      nRBC 0 /100 WBCs      Neutrophils Relative 79 %      Immat GRANS % 0 %      Lymphocytes Relative 10 %      Monocytes Relative 10 %      Eosinophils Relative 1 %      Basophils Relative 0 %      Neutrophils Absolute 11 33 Thousands/µL      Immature Grans Absolute 0 06 Thousand/uL      Lymphocytes Absolute 1 52 Thousands/µL      Monocytes Absolute 1 43 Thousand/µL      Eosinophils Absolute 0 17 Thousand/µL      Basophils Absolute 0 05 Thousands/µL                  CT abdomen pelvis with contrast   Final Result by Manjeet Shepherd MD (01/07 2000)      Acute uncomplicated sigmoid diverticulitis  Workstation performed: [id number]                    Procedures  Procedures         ED Course  ED Course as of 01/07/23 1629   Sat Jan 07, 2023   1616 CT showing acute uncomplicated sigmoid diverticulitis  Labs showing leukocytosis with neutrophilia and some dehydration  Patient received fluids and Toradol while in the ED and feels much improved  She is eager for discharge home  Abdominal exam remains stable  Will treat with Augmentin  No  complicated diverticulitis therefore no indication for admission at this time    Discussed supportive care measures at length  Should f/u with PCP within 24h of ED discharge  Medical Decision Making  On exam, well-appearing female, no acute distress, nontoxic appearance, afebrile, vitals unremarkable, resting comfortably lying down, abdomen soft and tender in the lower abdominal quadrants but tolerant of light and deep palpation, no guarding or other peritoneal signs, distention, remainder of exam unremarkable as above  Patient has mild abdominal tenderness on bedside exam   Recent colonoscopy  Will obtain CT abdomen and pelvis and labs, differential diagnosis includes but is not limited to colonic perforation, diverticulitis, colitis, enteritis  Lower abdominal pain: acute illness or injury  Sigmoid diverticulitis: acute illness or injury  Amount and/or Complexity of Data Reviewed  Labs: ordered  Radiology: ordered  Discussion of management or test interpretation with external provider(s): See ED course note for dispo and plan  I reviewed and discussed all lab and imaging findings with the patient at bedside  I discussed emergency department return parameters  I answered any and all questions the patient had regarding emergency department course of evaluation and treatment  The patient verbalized understanding of and agreement with plan  Risk  Prescription drug management        Critical Care  Total time providing critical care: 30-74 minutes      Disposition  Final diagnoses:   Sigmoid diverticulitis   Lower abdominal pain     Time reflects when diagnosis was documented in both MDM as applicable and the Disposition within this note     Time User Action Codes Description Comment    1/7/2023  4:18 PM Kate Katz [K57 [id number]] Sigmoid diverticulitis     1/7/2023  4:18 PM Kate Katz [[id number]] Lower abdominal pain       ED Disposition     ED Disposition   Discharge    Condition   Stable    Date/Time   Sat Jan 7, 2023  4:18 PM    Comment   Bola Lui D'Argenio discharge to home/self care  Follow-up Information     Follow up With Specialties Details Why Contact Info Additional Information    [address] Emergency Department Emergency Medicine Go to  If symptoms worsen [place] [id number] [address] [facility], [place], [facility] by [facility], [address] , Ruslan Metcalf MD Family Medicine Schedule an appointment as soon as possible for a visit in 1 day For further evaluation [address] [address]  [phone]             Patient's Medications   Discharge Prescriptions    AMOXICILLIN-CLAVULANATE (AUGMENTIN) 875-125 MG PER TABLET    Take 1 tablet by mouth every 12 (twelve) hours for 7 days       Start Date: 1/7/2023  End Date: 1/14/2023       Order Dose: 1 tablet       Quantity: 14 tablet    Refills: 0       No discharge procedures on file      PDMP Review       Value Time User    PDMP Reviewed  Yes 10/18/2022  3:42 PM Keith Ackerman MD          ED Provider  Electronically Signed by           Mable Mcdonough PA-C  01/07/23 [id number]

## 2023-01-09 ENCOUNTER — TELEPHONE (OUTPATIENT)
Dept: FAMILY MEDICINE CLINIC | Facility: CLINIC | Age: 68
End: 2023-01-09

## 2023-01-09 NOTE — TELEPHONE ENCOUNTER
Patient asking for call back, has colonoscopy on Wednesday, has adverse reaction on Saturday  Went to ED and dx sigmoid diverticulitis  Please advise

## 2023-01-16 NOTE — RESULT ENCOUNTER NOTE
Please call the patient with the results    I sent a message via [employer] but received a notification that it wasn't viewed    The  colon polyp removed was called an adenoma  This is a pre-cancerous lesion and was completely removed   There was no evidence of cancer in the polyp       She should have the colonoscopy repeated in 3 years due to a history of colon polyps

## 2023-01-27 DIAGNOSIS — I10 UNCONTROLLED HYPERTENSION: ICD-10-CM

## 2023-01-27 RX ORDER — METOPROLOL SUCCINATE 100 MG/1
TABLET, EXTENDED RELEASE ORAL
Qty: 90 TABLET | Refills: 3 | Status: SHIPPED | OUTPATIENT
Start: 2023-01-27

## 2023-02-23 ENCOUNTER — OFFICE VISIT (OUTPATIENT)
Dept: FAMILY MEDICINE CLINIC | Facility: CLINIC | Age: 68
End: 2023-02-23

## 2023-02-23 VITALS
HEART RATE: 62 BPM | BODY MASS INDEX: 30.72 KG/M2 | OXYGEN SATURATION: 98 % | SYSTOLIC BLOOD PRESSURE: 118 MMHG | TEMPERATURE: 96 F | WEIGHT: 173.4 LBS | HEIGHT: 63 IN | DIASTOLIC BLOOD PRESSURE: 70 MMHG

## 2023-02-23 DIAGNOSIS — F32.5 MAJOR DEPRESSIVE DISORDER WITH SINGLE EPISODE, IN FULL REMISSION (HCC): ICD-10-CM

## 2023-02-23 DIAGNOSIS — J45.20 MILD INTERMITTENT ASTHMA WITHOUT COMPLICATION: ICD-10-CM

## 2023-02-23 DIAGNOSIS — M81.0 AGE-RELATED OSTEOPOROSIS WITHOUT CURRENT PATHOLOGICAL FRACTURE: ICD-10-CM

## 2023-02-23 DIAGNOSIS — Z00.00 MEDICARE ANNUAL WELLNESS VISIT, SUBSEQUENT: ICD-10-CM

## 2023-02-23 DIAGNOSIS — R51.9 DAILY HEADACHE: ICD-10-CM

## 2023-02-23 DIAGNOSIS — M17.11 PRIMARY OSTEOARTHRITIS OF RIGHT KNEE: ICD-10-CM

## 2023-02-23 DIAGNOSIS — I10 BENIGN ESSENTIAL HYPERTENSION: Primary | ICD-10-CM

## 2023-02-23 DIAGNOSIS — Z86.010 PERSONAL HISTORY OF COLONIC POLYPS: ICD-10-CM

## 2023-02-23 RX ORDER — FLUTICASONE PROPIONATE 50 MCG
2 SPRAY, SUSPENSION (ML) NASAL DAILY
Qty: 48 ML | Refills: 3 | Status: SHIPPED | OUTPATIENT
Start: 2023-02-23

## 2023-02-23 NOTE — PATIENT INSTRUCTIONS
Blood pressure is well controlled  Asthma well controlled  Medicare wellness exam is completed  Medications are reviewed and remain the same  Observation of headaches which have been present for 2 weeks  Household is a bit more stressful as she has a full house  Recheck in 4 months  Medicare Preventive Visit Patient Instructions  Thank you for completing your Welcome to Medicare Visit or Medicare Annual Wellness Visit today  Your next wellness visit will be due in one year (2/24/2024)  The screening/preventive services that you may require over the next 5-10 years are detailed below  Some tests may not apply to you based off risk factors and/or age  Screening tests ordered at today's visit but not completed yet may show as past due  Also, please note that scanned in results may not display below  Preventive Screenings:  Service Recommendations Previous Testing/Comments   Colorectal Cancer Screening  * Colonoscopy    * Fecal Occult Blood Test (FOBT)/Fecal Immunochemical Test (FIT)  * Fecal DNA/Cologuard Test  * Flexible Sigmoidoscopy Age: 39-70 years old   Colonoscopy: every 10 years (may be performed more frequently if at higher risk)  OR  FOBT/FIT: every 1 year  OR  Cologuard: every 3 years  OR  Sigmoidoscopy: every 5 years  Screening may be recommended earlier than age 39 if at higher risk for colorectal cancer  Also, an individualized decision between you and your healthcare provider will decide whether screening between the ages of 74-80 would be appropriate  Colonoscopy: 01/04/2023  FOBT/FIT: Not on file  Cologuard: 11/14/2022  Sigmoidoscopy: Not on file          Breast Cancer Screening Age: 36 years old  Frequency: every 1-2 years  Not required if history of left and right mastectomy Mammogram: 07/09/2021        Cervical Cancer Screening Between the ages of 21-29, pap smear recommended once every 3 years  Between the ages of 33-67, can perform pap smear with HPV co-testing every 5 years  Recommendations may differ for women with a history of total hysterectomy, cervical cancer, or abnormal pap smears in past  Pap Smear: Not on file        Hepatitis C Screening Once for adults born between 1945 and 1965  More frequently in patients at high risk for Hepatitis C Hep C Antibody: 10/28/2019        Diabetes Screening 1-2 times per year if you're at risk for diabetes or have pre-diabetes Fasting glucose: 81 mg/dL (7/8/2021)  A1C: No results in last 5 years (No results in last 5 years)      Cholesterol Screening Once every 5 years if you don't have a lipid disorder  May order more often based on risk factors  Lipid panel: 04/10/2018          Other Preventive Screenings Covered by Medicare:  Abdominal Aortic Aneurysm (AAA) Screening: covered once if your at risk  You're considered to be at risk if you have a family history of AAA  Lung Cancer Screening: covers low dose CT scan once per year if you meet all of the following conditions: (1) Age 50-69; (2) No signs or symptoms of lung cancer; (3) Current smoker or have quit smoking within the last 15 years; (4) You have a tobacco smoking history of at least 20 pack years (packs per day multiplied by number of years you smoked); (5) You get a written order from a healthcare provider  Glaucoma Screening: covered annually if you're considered high risk: (1) You have diabetes OR (2) Family history of glaucoma OR (3)  aged 48 and older OR (3)  American aged 72 and older  Osteoporosis Screening: covered every 2 years if you meet one of the following conditions: (1) You're estrogen deficient and at risk for osteoporosis based off medical history and other findings; (2) Have a vertebral abnormality; (3) On glucocorticoid therapy for more than 3 months; (4) Have primary hyperparathyroidism; (5) On osteoporosis medications and need to assess response to drug therapy  Last bone density test (DXA Scan): 09/17/2020    HIV Screening: covered annually if you're between the age of 12-76  Also covered annually if you are younger than 13 and older than 72 with risk factors for HIV infection  For pregnant patients, it is covered up to 3 times per pregnancy  Immunizations:  Immunization Recommendations   Influenza Vaccine Annual influenza vaccination during flu season is recommended for all persons aged >= 6 months who do not have contraindications   Pneumococcal Vaccine   * Pneumococcal conjugate vaccine = PCV13 (Prevnar 13), PCV15 (Vaxneuvance), PCV20 (Prevnar 20)  * Pneumococcal polysaccharide vaccine = PPSV23 (Pneumovax) Adults 25-60 years old: 1-3 doses may be recommended based on certain risk factors  Adults 72 years old: 1-2 doses may be recommended based off what pneumonia vaccine you previously received   Hepatitis B Vaccine 3 dose series if at intermediate or high risk (ex: diabetes, end stage renal disease, liver disease)   Tetanus (Td) Vaccine - COST NOT COVERED BY MEDICARE PART B Following completion of primary series, a booster dose should be given every 10 years to maintain immunity against tetanus  Td may also be given as tetanus wound prophylaxis  Tdap Vaccine - COST NOT COVERED BY MEDICARE PART B Recommended at least once for all adults  For pregnant patients, recommended with each pregnancy  Shingles Vaccine (Shingrix) - COST NOT COVERED BY MEDICARE PART B  2 shot series recommended in those aged 48 and above     Health Maintenance Due:      Topic Date Due    Breast Cancer Screening: Mammogram  07/09/2022    Colorectal Cancer Screening  01/03/2026    Hepatitis C Screening  Completed     Immunizations Due:      Topic Date Due    Hepatitis A Vaccine (1 of 2 - Risk 2-dose series) Never done    Hepatitis B Vaccine (1 of 3 - Risk 3-dose series) Never done    COVID-19 Vaccine (4 - Booster for Moderna series) 04/22/2022     Advance Directives   What are advance directives?   Advance directives are legal documents that state your wishes and plans for medical care  These plans are made ahead of time in case you lose your ability to make decisions for yourself  Advance directives can apply to any medical decision, such as the treatments you want, and if you want to donate organs  What are the types of advance directives? There are many types of advance directives, and each state has rules about how to use them  You may choose a combination of any of the following:  Living will: This is a written record of the treatment you want  You can also choose which treatments you do not want, which to limit, and which to stop at a certain time  This includes surgery, medicine, IV fluid, and tube feedings  Durable power of  for healthcare [facility]): This is a written record that states who you want to make healthcare choices for you when you are unable to make them for yourself  This person, called a proxy, is usually a family member or a friend  You may choose more than 1 proxy  Do not resuscitate (DNR) order:  A DNR order is used in case your heart stops beating or you stop breathing  It is a request not to have certain forms of treatment, such as CPR  A DNR order may be included in other types of advance directives  Medical directive: This covers the care that you want if you are in a coma, near death, or unable to make decisions for yourself  You can list the treatments you want for each condition  Treatment may include pain medicine, surgery, blood transfusions, dialysis, IV or tube feedings, and a ventilator (breathing machine)  Values history: This document has questions about your views, beliefs, and how you feel and think about life  This information can help others choose the care that you would choose  Why are advance directives important? An advance directive helps you control your care  Although spoken wishes may be used, it is better to have your wishes written down  Spoken wishes can be misunderstood, or not followed   Treatments may be given even if you do not want them  An advance directive may make it easier for your family to make difficult choices about your care  Weight Management   Why it is important to manage your weight:  Being overweight increases your risk of health conditions such as heart disease, high blood pressure, type 2 diabetes, and certain types of cancer  It can also increase your risk for osteoarthritis, sleep apnea, and other respiratory problems  Aim for a slow, steady weight loss  Even a small amount of weight loss can lower your risk of health problems  How to lose weight safely:  A safe and healthy way to lose weight is to eat fewer calories and get regular exercise  You can lose up about 1 pound a week by decreasing the number of calories you eat by 500 calories each day  Healthy meal plan for weight management:  A healthy meal plan includes a variety of foods, contains fewer calories, and helps you stay healthy  A healthy meal plan includes the following:  Eat whole-grain foods more often  A healthy meal plan should contain fiber  Fiber is the part of grains, fruits, and vegetables that is not broken down by your body  Whole-grain foods are healthy and provide extra fiber in your diet  Some examples of whole-grain foods are whole-wheat breads and pastas, oatmeal, brown rice, and bulgur  Eat a variety of vegetables every day  Include dark, leafy greens such as spinach, kale, shukri greens, and mustard greens  Eat yellow and orange vegetables such as carrots, sweet potatoes, and winter squash  Eat a variety of fruits every day  Choose fresh or canned fruit (canned in its own juice or light syrup) instead of juice  Fruit juice has very little or no fiber  Eat low-fat dairy foods  Drink fat-free (skim) milk or 1% milk  Eat fat-free yogurt and low-fat cottage cheese  Try low-fat cheeses such as mozzarella and other reduced-fat cheeses  Choose meat and other protein foods that are low in fat    Choose beans or other legumes such as split peas or lentils  Choose fish, skinless poultry (chicken or turkey), or lean cuts of red meat (beef or pork)  Before you cook meat or poultry, cut off any visible fat  Use less fat and oil  Try baking foods instead of frying them  Add less fat, such as margarine, sour cream, regular salad dressing and mayonnaise to foods  Eat fewer high-fat foods  Some examples of high-fat foods include french fries, doughnuts, ice cream, and cakes  Eat fewer sweets  Limit foods and drinks that are high in sugar  This includes candy, cookies, regular soda, and sweetened drinks  Exercise:  Exercise at least 30 minutes per day on most days of the week  Some examples of exercise include walking, biking, dancing, and swimming  You can also fit in more physical activity by taking the stairs instead of the elevator or parking farther away from stores  Ask your healthcare provider about the best exercise plan for you  © Copyright [employer] 2018 Information is for End User's use only and may not be sold, redistributed or otherwise used for commercial purposes   All illustrations and images included in CareNotes® are the copyrighted property of A D A M , Inc  or [address] [place] [address]

## 2023-02-23 NOTE — PROGRESS NOTES
Assessment and Plan:     Problem List Items Addressed This Visit    None  Visit Diagnoses     Medicare annual wellness visit, subsequent               Preventive health issues were discussed with patient, and age appropriate screening tests were ordered as noted in patient's After Visit Summary  Personalized health advice and appropriate referrals for health education or preventive services given if needed, as noted in patient's After Visit Summary  History of Present Illness:     Patient presents for a Medicare Wellness Visit    HPI   Patient Care Team:  Rubia Boykin MD as PCP - General (Family Medicine)     Review of Systems:     Review of Systems     Problem List:     Patient Active Problem List   Diagnosis   • Anxiety state   • Benign essential hypertension   • Chronic rhinitis   • Mild intermittent asthma without complication   • Major depressive disorder with single episode, in full remission [facility])   • 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 hair loss   • Primary osteoarthritis of right knee   • Alcohol use disorder, mild, abuse   • Age-related osteoporosis without current pathological fracture   • Personal history of colonic polyps      Past Medical and Surgical History:     Past Medical History:   Diagnosis Date   • Age-related osteoporosis without current pathological fracture 02/14/2022   • Anxiety state 12/19/2014   • Benign essential hypertension 01/14/2010   • Chronic rhinitis 1/12/2012   • Depression 2019   • 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 hair loss 2/4/2020   • Fracture of multiple ribs of left side 12/21/2020   • Hyponatremia 10/02/2020   • Mild persistent asthma without complication 4/6/8132   • Personal history of colonic polyps 1/4/2023    Multiple polyps removed 1/4/2023  Recall 3 years     • Right chronic serous otitis media 11/07/2017    Resolved after myringotomy tube   • Scapula fracture 12/21/2020   • Temporal arteritis ([facility] [address] ) 09/04/2020    Biopsy negative-treated with prednisone for 1 year     Past Surgical History: Procedure Laterality Date   • COLONOSCOPY     • LAPAROSCOPY      X2 for endometriosis   • ND LIGATION/BIOPSY TEMPORAL ARTERY Left 08/14/2020    Procedure: TEMPORAL ARTERY BIOPSY;  Surgeon: Ena Tompkins MD;  Location: [address] [place] [address];  Service: General   • TOTAL KNEE ARTHROPLASTY Right 03/28/2022    Dr Clifford Gaona      Family History:     Family History   Problem Relation Age of Onset   • Coronary artery disease Mother    • Coronary artery disease Father    • No Known Problems Maternal Grandmother    • No Known Problems Maternal Grandfather    • Leukemia Paternal Grandmother 71   • No Known Problems Paternal Grandfather    • No Known Problems Son    • No Known Problems Son       Social History:     Social History     Socioeconomic History   • Marital status: /Civil Union     Spouse name: Not on file   • Number of children: 2   • Years of education: Not on file   • Highest education level: Not on file   Occupational History   • Not on file   Tobacco Use   • Smoking status: Never   • Smokeless tobacco: Never   Vaping Use   • Vaping Use: Never used   Substance and Sexual Activity   • Alcohol use: Yes     Comment: socially   • Drug use: Never   • Sexual activity: Not Currently   Other Topics Concern   • Not on file   Social History Narrative     since 1981  Two sons  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, gardening, walking, needle work, painting, writing books  6/22-[place]  and will be moving back home       Social Determinants of Health     Financial Resource Strain: Not on file   Food Insecurity: Not on file   Transportation Needs: Not on file   Physical Activity: Not on file   Stress: Not on file   Social Connections: Not on file   Intimate Partner Violence: Not on file   Housing Stability: Not on file      Medications and Allergies:     Current Outpatient Medications   Medication Sig Dispense Refill   • albuterol (2 5 mg/3 mL) 0 083 % nebulizer solution Take 3 mL (2 5 mg total) by nebulization every 6 (six) hours as needed for wheezing or shortness of breath 180 mL 5   • albuterol (ProAir HFA) 90 mcg/act inhaler Inhale 2 puffs every 6 (six) hours as needed for wheezing 8 5 g 5   • alendronate (FOSAMAX) 70 mg tablet 1 tab weekly on empty stomach in the upright position 13 tablet 3   • Aluminum & Magnesium Hydroxide (MAGNESIUM-ALUMINUM PO) Take by mouth     • amLODIPine (NORVASC) 5 mg tablet TAKE 1 TABLET BY MOUTH DAILY ON MONDAYS, WEDNESDAYS, AND FRIDAYS AND 1/2 TABLET ALL OTHER DAYS  192 tablet 2   • budesonide-formoterol (Symbicort) 80-4 5 MCG/ACT inhaler Inhale 2 puffs 2 (two) times a day Rinse mouth after use  10 2 g 5   • fluticasone (FLONASE) 50 mcg/act nasal spray SPRAY 1 SPRAY INTO EACH NOSTRIL EVERY DAY 48 mL 3   • Fluticasone-Salmeterol (Advair Diskus) 250-50 mcg/dose inhaler Inhale 1 puff 2 (two) times a day Rinse mouth after use  60 blister 5   • loratadine (CLARITIN) 10 mg tablet Take 1 tablet (10 mg total) by mouth daily 30 tablet 0   • metoprolol succinate (TOPROL-XL) 100 mg 24 hr tablet TAKE 1 TABLET BY MOUTH EVERY DAY 90 tablet 3   • olmesartan (BENICAR) 20 mg tablet TAKE 1 TABLET BY MOUTH EVERY DAY 90 tablet 3   • Omega-3 1000 MG CAPS Take by mouth     • spironolactone (ALDACTONE) 50 mg tablet TAKE 1 TABLET BY MOUTH EVERY DAY 90 tablet 3   • Diclofenac Sodium (VOLTAREN) 1 % Apply 4 g topically 4 (four) times a day (Patient not taking: Reported on 2/23/2023) 500 g 5   • meloxicam (MOBIC) 7 5 mg tablet TAKE 1 TABLET BY MOUTH EVERY DAY 90 tablet 3   • predniSONE 10 mg tablet Take 5 tablets for 2 days, 4 tablets for 2 days, 3 for 2 days, 2 for 2 days, 1 for 2 days 30 tablet 0   • triamcinolone (KENALOG) 0 147 MG/GM topical spray Apply topically 2 (two) times a day 63 g 5     No current facility-administered medications for this visit       No Known Allergies   Immunizations:     Immunization History   Administered Date(s) Administered   • COVID-19 MODERNA VACC 0 5 ML IM 01/25/2021, 02/22/2021, 02/25/2022   • INFLUENZA 10/03/2016, 10/24/2022   • Influenza Split High Dose Preservative Free IM 10/15/2018   • Influenza, high dose seasonal 0 7 mL 11/01/2019, 10/02/2020, 11/15/2021, 10/24/2022   • Pneumococcal Conjugate 13-Valent 04/10/2018   • Pneumococcal Polysaccharide PPV23 10/06/2017, 11/01/2019   • Tdap 04/28/2012, 10/15/2018   • Zoster 12/02/2013      Health Maintenance:         Topic Date Due   • Breast Cancer Screening: Mammogram  07/09/2022   • Colorectal Cancer Screening  01/03/2026   • Hepatitis C Screening  Completed         Topic Date Due   • Hepatitis A Vaccine (1 of 2 - Risk 2-dose series) Never done   • Hepatitis B Vaccine (1 of 3 - Risk 3-dose series) Never done   • COVID-19 Vaccine (4 - Booster for [place] series) 04/22/2022      Medicare Screening Tests and Risk Assessments:     Rich Barrientos is here for her Subsequent Wellness visit  Health Risk Assessment:   Patient rates overall health as good  Patient feels that their physical health rating is slightly better  Patient is satisfied with their life  Eyesight was rated as same  Hearing was rated as slightly worse  Patient feels that their emotional and mental health rating is same  Patients states they are sometimes angry  Patient states they are sometimes unusually tired/fatigued  Pain experienced in the last 7 days has been some  Patient's pain rating has been 8/10  Patient states that she has experienced no weight loss or gain in last 6 months  Fall Risk Screening: In the past year, patient has experienced: no history of falling in past year      Urinary Incontinence Screening:   Patient has not leaked urine accidently in the last six months  Home Safety:  Patient has trouble with stairs inside or outside of their home  Patient has working smoke alarms and has working carbon monoxide detector  Home safety hazards include: none       Nutrition:   Current diet is Regular  Medications:   Patient is currently taking over-the-counter supplements  OTC medications include: see medication list  Patient is able to manage medications  Activities of Daily Living (ADLs)/Instrumental Activities of Daily Living (IADLs):   Walk and transfer into and out of bed and chair?: Yes  Dress and groom yourself?: Yes    Bathe or shower yourself?: Yes    Feed yourself? Yes  Do your laundry/housekeeping?: Yes  Manage your money, pay your bills and track your expenses?: Yes  Make your own meals?: Yes    Do your own shopping?: Yes    Previous Hospitalizations:   Any hospitalizations or ED visits within the last 12 months?: Yes    How many hospitalizations have you had in the last year?: 3-4    Advance Care Planning:   Living will: Yes    Advanced directive: Yes    Five wishes given: Yes      PREVENTIVE SCREENINGS      Cardiovascular Screening:    General: Screening Current      Diabetes Screening:     General: Screening Current      Colorectal Cancer Screening:     General: Screening Current      Breast Cancer Screening:     General: Screening Current      Cervical Cancer Screening:    General: Screening Not Indicated      Osteoporosis Screening:    General: Screening Not Indicated and History Osteoporosis      Lung Cancer Screening:     General: Screening Not Indicated      Hepatitis C Screening:    General: Screening Current    Screening, Brief Intervention, and Referral to Treatment (SBIRT)    Screening    Typical number of drinks in a week: 7    Single Item Drug Screening:  How often have you used an illegal drug (including marijuana) or a prescription medication for non-medical reasons in the past year? never    Single Item Drug Screen Score: 0  Interpretation: Negative screen for possible drug use disorder    No results found       Physical Exam:     Pulse 62   Temp (!) 96 °F (35 6 °C) (Temporal)   Ht 5' 3" (1 6 m)   Wt 78 7 kg (173 lb 6 4 oz)   SpO2 98%   BMI 30 72 kg/m² Physical Exam     Tika Morfin MD

## 2023-02-23 NOTE — PROGRESS NOTES
Chief Complaint   Patient presents with   • Medicare Wellness Visit   • Follow-up        HPI   Here for follow-up of hypertension, hyponatremia, temporal arteritis, and depression  And knee DJD  Headaches are present over the last 2 weeks  Behind her left eye and radiates to the back of the head  Does respond to Tylenol  Now has a full house  Both sons living with her along with Jose Juan's girlfriend, 1 sometimes out of control grandson, 2 dogs and a cat  Left knee is improved  Seen in the ER with abdominal pain  Diagnosed with diverticulitis which responded to Augmentin  At that time, sodium was 128, sugar and kidney function normal   Wheezing has been under control  He is not using Symbicort or Advair  Cologuard test was positive  Subsequently had a colonoscopy  3 polyps were removed  On a 3-year recall  Past Medical History:   Diagnosis Date   • Age-related osteoporosis without current pathological fracture 02/14/2022   • Anxiety state 12/19/2014   • Benign essential hypertension 01/14/2010   • Chronic rhinitis 01/12/2012   • Depression 2019   • Diverticulitis 04/08/0785    Uncomplicated-treated with Augmentin   • 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 hair loss 02/04/2020   • Fracture of multiple ribs of left side 12/21/2020   • Hyponatremia 10/02/2020   • Mild persistent asthma without complication 95/21/8515   • Personal history of colonic polyps 01/04/2023    Multiple polyps removed 1/4/2023  Recall 3 years     • Right chronic serous otitis media 11/07/2017    Resolved after myringotomy tube   • Scapula fracture 12/21/2020   • Temporal arteritis ([address] ) 09/04/2020    Biopsy negative-treated with prednisone for 1 year        Past Surgical History:   Procedure Laterality Date   • COLONOSCOPY     • LAPAROSCOPY      X2 for endometriosis   • MI LIGATION/BIOPSY TEMPORAL ARTERY Left 08/14/2020    Procedure: TEMPORAL ARTERY BIOPSY;  Surgeon: Jaydon Pan MD;  Location: [address] [place] [address];  Service: General   • TOTAL KNEE ARTHROPLASTY Right 03/28/2022    Dr Garrido Batch History     Tobacco Use   • Smoking status: Never   • Smokeless tobacco: Never   Substance Use Topics   • Alcohol use: Yes     Comment: socially       Social History     Social History Narrative     since 1981  Two sons  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, gardening, walking, needle work, painting, writing books  6/22-[place]  and moved back home  With Inder Manzano moved in 1/23/23 with his girlfriend  Dog and cat  The following portions of the patient's history were reviewed and updated as appropriate: allergies, current medications, past family history, past medical history, past social history, past surgical history and problem list       Review of Systems       /70 (BP Location: Left arm, Patient Position: Sitting, Cuff Size: Large)   Pulse 62   Temp (!) 96 °F (35 6 °C) (Temporal)   Ht 5' 3" (1 6 m)   Wt 78 7 kg (173 lb 6 4 oz)   SpO2 98%   BMI 30 72 kg/m²      Physical Exam   Appears well  Lungs are clear  Heart regular  Abdomen soft and nontender  Mood is okay  Affect appropriate  Mild tenderness over the left maxillary and left frontal sinus                Current Outpatient Medications:   •  albuterol (2 5 mg/3 mL) 0 083 % nebulizer solution, Take 3 mL (2 5 mg total) by nebulization every 6 (six) hours as needed for wheezing or shortness of breath, Disp: 180 mL, Rfl: 5  •  albuterol (ProAir HFA) 90 mcg/act inhaler, Inhale 2 puffs every 6 (six) hours as needed for wheezing, Disp: 8 5 g, Rfl: 5  •  alendronate (FOSAMAX) 70 mg tablet, 1 tab weekly on empty stomach in the upright position, Disp: 13 tablet, Rfl: 3  •  Aluminum & Magnesium Hydroxide (MAGNESIUM-ALUMINUM PO), Take by mouth, Disp: , Rfl:   •  amLODIPine (NORVASC) 5 mg tablet, TAKE 1 TABLET BY MOUTH DAILY ON MONDAYS, WEDNESDAYS, AND FRIDAYS AND 1/2 TABLET ALL OTHER DAYS , Disp: 192 tablet, Rfl: 2  • budesonide-formoterol (Symbicort) 80-4 5 MCG/ACT inhaler, Inhale 2 puffs 2 (two) times a day Rinse mouth after use , Disp: 10 2 g, Rfl: 5  •  fluticasone (FLONASE) 50 mcg/act nasal spray, 2 sprays into each nostril daily, Disp: 48 mL, Rfl: 3  •  Fluticasone-Salmeterol (Advair Diskus) 250-50 mcg/dose inhaler, Inhale 1 puff 2 (two) times a day Rinse mouth after use , Disp: 60 blister, Rfl: 5  •  loratadine (CLARITIN) 10 mg tablet, Take 1 tablet (10 mg total) by mouth daily, Disp: 30 tablet, Rfl: 0  •  metoprolol succinate (TOPROL-XL) 100 mg 24 hr tablet, TAKE 1 TABLET BY MOUTH EVERY DAY, Disp: 90 tablet, Rfl: 3  •  olmesartan (BENICAR) 20 mg tablet, TAKE 1 TABLET BY MOUTH EVERY DAY, Disp: 90 tablet, Rfl: 3  •  Omega-3 1000 MG CAPS, Take by mouth, Disp: , Rfl:   •  spironolactone (ALDACTONE) 50 mg tablet, TAKE 1 TABLET BY MOUTH EVERY DAY, Disp: 90 tablet, Rfl: 3  •  Diclofenac Sodium (VOLTAREN) 1 %, Apply 4 g topically 4 (four) times a day (Patient not taking: Reported on 2/23/2023), Disp: 500 g, Rfl: 5     No problem-specific Assessment & Plan notes found for this encounter  Diagnoses and all orders for this visit:    Benign essential hypertension    Medicare annual wellness visit, subsequent  -     fluticasone (FLONASE) 50 mcg/act nasal spray; 2 sprays into each nostril daily    Major depressive disorder with single episode, in full remission ([facility] [address] )    Age-related osteoporosis without current pathological fracture    Mild intermittent asthma without complication    Personal history of colonic polyps    Primary osteoarthritis of right knee    Daily headache        Patient Instructions     Blood pressure is well controlled  Asthma well controlled  Medicare wellness exam is completed  Medications are reviewed and remain the same  Observation of headaches which have been present for 2 weeks  Household is a bit more stressful as she has a full house  Recheck in 4 months        Medicare Preventive Visit Patient Instructions  Thank you for completing your Welcome to Medicare Visit or Medicare Annual Wellness Visit today  Your next wellness visit will be due in one year (2/24/2024)  The screening/preventive services that you may require over the next 5-10 years are detailed below  Some tests may not apply to you based off risk factors and/or age  Screening tests ordered at today's visit but not completed yet may show as past due  Also, please note that scanned in results may not display below  Preventive Screenings:  Service Recommendations Previous Testing/Comments   Colorectal Cancer Screening  * Colonoscopy    * Fecal Occult Blood Test (FOBT)/Fecal Immunochemical Test (FIT)  * Fecal DNA/Cologuard Test  * Flexible Sigmoidoscopy Age: 39-70 years old   Colonoscopy: every 10 years (may be performed more frequently if at higher risk)  OR  FOBT/FIT: every 1 year  OR  Cologuard: every 3 years  OR  Sigmoidoscopy: every 5 years  Screening may be recommended earlier than age 39 if at higher risk for colorectal cancer  Also, an individualized decision between you and your healthcare provider will decide whether screening between the ages of 74-80 would be appropriate  Colonoscopy: 01/04/2023  FOBT/FIT: Not on file  Cologuard: 11/14/2022  Sigmoidoscopy: Not on file          Breast Cancer Screening Age: 36 years old  Frequency: every 1-2 years  Not required if history of left and right mastectomy Mammogram: 07/09/2021        Cervical Cancer Screening Between the ages of 21-29, pap smear recommended once every 3 years  Between the ages of 33-67, can perform pap smear with HPV co-testing every 5 years     Recommendations may differ for women with a history of total hysterectomy, cervical cancer, or abnormal pap smears in past  Pap Smear: Not on file        Hepatitis C Screening Once for adults born between 1945 and 1965  More frequently in patients at high risk for Hepatitis C Hep C Antibody: 10/28/2019        Diabetes Screening 1-2 times per year if you're at risk for diabetes or have pre-diabetes Fasting glucose: 81 mg/dL (7/8/2021)  A1C: No results in last 5 years (No results in last 5 years)      Cholesterol Screening Once every 5 years if you don't have a lipid disorder  May order more often based on risk factors  Lipid panel: 04/10/2018          Other Preventive Screenings Covered by Medicare:  1  Abdominal Aortic Aneurysm (AAA) Screening: covered once if your at risk  You're considered to be at risk if you have a family history of AAA  2  Lung Cancer Screening: covers low dose CT scan once per year if you meet all of the following conditions: (1) Age 50-69; (2) No signs or symptoms of lung cancer; (3) Current smoker or have quit smoking within the last 15 years; (4) You have a tobacco smoking history of at least 20 pack years (packs per day multiplied by number of years you smoked); (5) You get a written order from a healthcare provider  3  Glaucoma Screening: covered annually if you're considered high risk: (1) You have diabetes OR (2) Family history of glaucoma OR (3)  aged 48 and older OR (3)  American aged 72 and older  3  Osteoporosis Screening: covered every 2 years if you meet one of the following conditions: (1) You're estrogen deficient and at risk for osteoporosis based off medical history and other findings; (2) Have a vertebral abnormality; (3) On glucocorticoid therapy for more than 3 months; (4) Have primary hyperparathyroidism; (5) On osteoporosis medications and need to assess response to drug therapy  · Last bone density test (DXA Scan): 09/17/2020   5  HIV Screening: covered annually if you're between the age of 15-65  Also covered annually if you are younger than 13 and older than 72 with risk factors for HIV infection  For pregnant patients, it is covered up to 3 times per pregnancy      Immunizations:  Immunization Recommendations   Influenza Vaccine Annual influenza vaccination during flu season is recommended for all persons aged >= 6 months who do not have contraindications   Pneumococcal Vaccine   * Pneumococcal conjugate vaccine = PCV13 (Prevnar 13), PCV15 (Vaxneuvance), PCV20 (Prevnar 20)  * Pneumococcal polysaccharide vaccine = PPSV23 (Pneumovax) Adults 25-60 years old: 1-3 doses may be recommended based on certain risk factors  Adults 72 years old: 1-2 doses may be recommended based off what pneumonia vaccine you previously received   Hepatitis B Vaccine 3 dose series if at intermediate or high risk (ex: diabetes, end stage renal disease, liver disease)   Tetanus (Td) Vaccine - COST NOT COVERED BY MEDICARE PART B Following completion of primary series, a booster dose should be given every 10 years to maintain immunity against tetanus  Td may also be given as tetanus wound prophylaxis  Tdap Vaccine - COST NOT COVERED BY MEDICARE PART B Recommended at least once for all adults  For pregnant patients, recommended with each pregnancy  Shingles Vaccine (Shingrix) - COST NOT COVERED BY MEDICARE PART B  2 shot series recommended in those aged 48 and above     Health Maintenance Due:      Topic Date Due   • Breast Cancer Screening: Mammogram  07/09/2022   • Colorectal Cancer Screening  01/03/2026   • Hepatitis C Screening  Completed     Immunizations Due:      Topic Date Due   • Hepatitis A Vaccine (1 of 2 - Risk 2-dose series) Never done   • Hepatitis B Vaccine (1 of 3 - Risk 3-dose series) Never done   • COVID-19 Vaccine (4 - Booster for Moderna series) 04/22/2022     Advance Directives   What are advance directives? Advance directives are legal documents that state your wishes and plans for medical care  These plans are made ahead of time in case you lose your ability to make decisions for yourself  Advance directives can apply to any medical decision, such as the treatments you want, and if you want to donate organs  What are the types of advance directives?   There are many types of advance directives, and each state has rules about how to use them  You may choose a combination of any of the following:  · Living will: This is a written record of the treatment you want  You can also choose which treatments you do not want, which to limit, and which to stop at a certain time  This includes surgery, medicine, IV fluid, and tube feedings  · Durable power of  for healthcare [facility]): This is a written record that states who you want to make healthcare choices for you when you are unable to make them for yourself  This person, called a proxy, is usually a family member or a friend  You may choose more than 1 proxy  · Do not resuscitate (DNR) order:  A DNR order is used in case your heart stops beating or you stop breathing  It is a request not to have certain forms of treatment, such as CPR  A DNR order may be included in other types of advance directives  · Medical directive: This covers the care that you want if you are in a coma, near death, or unable to make decisions for yourself  You can list the treatments you want for each condition  Treatment may include pain medicine, surgery, blood transfusions, dialysis, IV or tube feedings, and a ventilator (breathing machine)  · Values history: This document has questions about your views, beliefs, and how you feel and think about life  This information can help others choose the care that you would choose  Why are advance directives important? An advance directive helps you control your care  Although spoken wishes may be used, it is better to have your wishes written down  Spoken wishes can be misunderstood, or not followed  Treatments may be given even if you do not want them  An advance directive may make it easier for your family to make difficult choices about your care     Weight Management   Why it is important to manage your weight:  Being overweight increases your risk of health conditions such as heart disease, high blood pressure, type 2 diabetes, and certain types of cancer  It can also increase your risk for osteoarthritis, sleep apnea, and other respiratory problems  Aim for a slow, steady weight loss  Even a small amount of weight loss can lower your risk of health problems  How to lose weight safely:  A safe and healthy way to lose weight is to eat fewer calories and get regular exercise  You can lose up about 1 pound a week by decreasing the number of calories you eat by 500 calories each day  Healthy meal plan for weight management:  A healthy meal plan includes a variety of foods, contains fewer calories, and helps you stay healthy  A healthy meal plan includes the following:  · Eat whole-grain foods more often  A healthy meal plan should contain fiber  Fiber is the part of grains, fruits, and vegetables that is not broken down by your body  Whole-grain foods are healthy and provide extra fiber in your diet  Some examples of whole-grain foods are whole-wheat breads and pastas, oatmeal, brown rice, and bulgur  · Eat a variety of vegetables every day  Include dark, leafy greens such as spinach, kale, shukri greens, and mustard greens  Eat yellow and orange vegetables such as carrots, sweet potatoes, and winter squash  · Eat a variety of fruits every day  Choose fresh or canned fruit (canned in its own juice or light syrup) instead of juice  Fruit juice has very little or no fiber  · Eat low-fat dairy foods  Drink fat-free (skim) milk or 1% milk  Eat fat-free yogurt and low-fat cottage cheese  Try low-fat cheeses such as mozzarella and other reduced-fat cheeses  · Choose meat and other protein foods that are low in fat  Choose beans or other legumes such as split peas or lentils  Choose fish, skinless poultry (chicken or turkey), or lean cuts of red meat (beef or pork)  Before you cook meat or poultry, cut off any visible fat  · Use less fat and oil  Try baking foods instead of frying them   Add less fat, such as margarine, sour cream, regular salad dressing and mayonnaise to foods  Eat fewer high-fat foods  Some examples of high-fat foods include french fries, doughnuts, ice cream, and cakes  · Eat fewer sweets  Limit foods and drinks that are high in sugar  This includes candy, cookies, regular soda, and sweetened drinks  Exercise:  Exercise at least 30 minutes per day on most days of the week  Some examples of exercise include walking, biking, dancing, and swimming  You can also fit in more physical activity by taking the stairs instead of the elevator or parking farther away from stores  Ask your healthcare provider about the best exercise plan for you  © Copyright [employer] 2018 Information is for End User's use only and may not be sold, redistributed or otherwise used for commercial purposes   All illustrations and images included in CareNotes® are the copyrighted property of A D A M , Inc  or [address] [place] [address]

## 2023-07-25 DIAGNOSIS — I10 BENIGN ESSENTIAL HYPERTENSION: ICD-10-CM

## 2023-07-25 RX ORDER — SPIRONOLACTONE 50 MG/1
TABLET, FILM COATED ORAL
Qty: 90 TABLET | Refills: 3 | Status: SHIPPED | OUTPATIENT
Start: 2023-07-25

## 2023-08-30 ENCOUNTER — PATIENT MESSAGE (OUTPATIENT)
Dept: FAMILY MEDICINE CLINIC | Facility: CLINIC | Age: 68
End: 2023-08-30

## 2023-08-30 DIAGNOSIS — J45.21 MILD INTERMITTENT ASTHMA WITH ACUTE EXACERBATION: ICD-10-CM

## 2023-08-30 DIAGNOSIS — F33.1 MODERATE EPISODE OF RECURRENT MAJOR DEPRESSIVE DISORDER (HCC): ICD-10-CM

## 2023-08-30 RX ORDER — ALBUTEROL SULFATE 90 UG/1
2 AEROSOL, METERED RESPIRATORY (INHALATION) EVERY 6 HOURS PRN
Qty: 8.5 G | Refills: 5 | Status: SHIPPED | OUTPATIENT
Start: 2023-08-30

## 2023-09-25 ENCOUNTER — OFFICE VISIT (OUTPATIENT)
Dept: FAMILY MEDICINE CLINIC | Facility: CLINIC | Age: 68
End: 2023-09-25
Payer: MEDICARE

## 2023-09-25 VITALS
DIASTOLIC BLOOD PRESSURE: 82 MMHG | SYSTOLIC BLOOD PRESSURE: 156 MMHG | HEART RATE: 64 BPM | HEIGHT: 63 IN | WEIGHT: 174.2 LBS | TEMPERATURE: 97.6 F | OXYGEN SATURATION: 97 % | BODY MASS INDEX: 30.87 KG/M2

## 2023-09-25 DIAGNOSIS — F32.5 MAJOR DEPRESSIVE DISORDER WITH SINGLE EPISODE, IN FULL REMISSION (HCC): ICD-10-CM

## 2023-09-25 DIAGNOSIS — Z23 NEEDS FLU SHOT: ICD-10-CM

## 2023-09-25 DIAGNOSIS — J45.20 MILD INTERMITTENT ASTHMA WITHOUT COMPLICATION: Primary | ICD-10-CM

## 2023-09-25 DIAGNOSIS — M81.0 AGE-RELATED OSTEOPOROSIS WITHOUT CURRENT PATHOLOGICAL FRACTURE: ICD-10-CM

## 2023-09-25 DIAGNOSIS — I10 BENIGN ESSENTIAL HYPERTENSION: ICD-10-CM

## 2023-09-25 PROCEDURE — 90662 IIV NO PRSV INCREASED AG IM: CPT

## 2023-09-25 PROCEDURE — G0008 ADMIN INFLUENZA VIRUS VAC: HCPCS

## 2023-09-25 PROCEDURE — 99214 OFFICE O/P EST MOD 30 MIN: CPT | Performed by: FAMILY MEDICINE

## 2023-09-25 RX ORDER — AMLODIPINE BESYLATE 5 MG/1
TABLET ORAL
Start: 2023-09-25

## 2023-09-25 NOTE — PATIENT INSTRUCTIONS
Blood pressure is too high today. She was asked to check blood pressures at home and increase dose of amlodipine if blood pressure running above 140. Otherwise, meds stay the same. Mood is well controlled. Not needing steroid inhaler for the most part. Although has Symbicort if needed. Also albuterol for breakthrough wheezing. Flu shot. COVID booster recommended and should be obtained at local drugstore. Recheck in 4 months.

## 2023-09-25 NOTE — PROGRESS NOTES
Chief Complaint   Patient presents with   • Follow-up     4 month         HPI   Here for follow-up of hypertension, hyponatremia, temporal arteritis, and depression. And knee DJD. Is feeling okay. Mood has been good. Her sons have moved out but are nearby and doing okay. Asthma is well controlled. Past Medical History:   Diagnosis Date   • Age-related osteoporosis without current pathological fracture 02/14/2022   • Anxiety state 12/19/2014   • Benign essential hypertension 01/14/2010   • Chronic rhinitis 01/12/2012   • Depression 2019   • Diverticulitis 67/61/6877    Uncomplicated-treated with Augmentin   • 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 hair loss 02/04/2020   • Fracture of multiple ribs of left side 12/21/2020   • Hyponatremia 10/02/2020   • Mild persistent asthma without complication 71/02/0780   • Personal history of colonic polyps 01/04/2023    Multiple polyps removed 1/4/2023. Recall 3 years. • Right chronic serous otitis media 11/07/2017    Resolved after myringotomy tube   • Scapula fracture 12/21/2020   • Temporal arteritis ([address]) 09/04/2020    Biopsy negative-treated with prednisone for 1 year        Past Surgical History:   Procedure Laterality Date   • COLONOSCOPY     • LAPAROSCOPY      X2 for endometriosis   • [place] LIGATION/BIOPSY TEMPORAL ARTERY Left 08/14/2020    Procedure: TEMPORAL ARTERY BIOPSY;  Surgeon: Mikal Carr MD;  [facility] MAIN OR;  Service: General   • TOTAL KNEE ARTHROPLASTY Right 03/28/2022    Dr. Sonja Bishop History     Tobacco Use   • Smoking status: Never   • Smokeless tobacco: Never   Substance Use Topics   • Alcohol use: Yes     Comment: socially       Social History     Social History Narrative     since 1981. Two sons. Retired in 2018. Was a .  is a retired teacher. Enjoys reading, gardening, walking, needle work, painting, writing books. 9/23-Richard living in [place]. Jose Juan engaged living in [place] or [place].          The following portions of the patient's history were reviewed and updated as appropriate: allergies, current medications, past family history, past medical history, past social history, past surgical history and problem list.      Review of Systems       /82 (BP Location: Left arm, Patient Position: Sitting, Cuff Size: Standard)   Pulse 64   Temp 97.6 °F (36.4 °C) (Temporal)   Ht 5' 3" (1.6 m)   Wt 79 kg (174 lb 3.2 oz)   SpO2 97%   BMI 30.86 kg/m²      Physical Exam   Repeat blood pressure 160/78. Lungs are clear. Heart regular. Abdomen nontender. Mood is okay. Affect appropriate.                 Current Outpatient Medications:   •  albuterol (2.5 mg/3 mL) 0.083 % nebulizer solution, Take 3 mL (2.5 mg total) by nebulization every 6 (six) hours as needed for wheezing or shortness of breath, Disp: 180 mL, Rfl: 5  •  albuterol (ProAir HFA) 90 mcg/act inhaler, Inhale 2 puffs every 6 (six) hours as needed for wheezing, Disp: 8.5 g, Rfl: 5  •  alendronate (FOSAMAX) 70 mg tablet, 1 tab weekly on empty stomach in the upright position, Disp: 13 tablet, Rfl: 3  •  Aluminum & Magnesium Hydroxide (MAGNESIUM-ALUMINUM PO), Take by mouth, Disp: , Rfl:   •  amLODIPine (NORVASC) 5 mg tablet, One half tablet daily, Disp: , Rfl:   •  budesonide-formoterol (Symbicort) 80-4.5 MCG/ACT inhaler, Inhale 2 puffs 2 (two) times a day Rinse mouth after use., Disp: 10.2 g, Rfl: 5  •  loratadine (CLARITIN) 10 mg tablet, Take 1 tablet (10 mg total) by mouth daily, Disp: 30 tablet, Rfl: 0  •  metoprolol succinate (TOPROL-XL) 100 mg 24 hr tablet, TAKE 1 TABLET BY MOUTH EVERY DAY, Disp: 90 tablet, Rfl: 3  •  olmesartan (BENICAR) 20 mg tablet, TAKE 1 TABLET BY MOUTH EVERY DAY, Disp: 90 tablet, Rfl: 3  •  Omega-3 1000 MG CAPS, Take by mouth, Disp: , Rfl:   •  sertraline (ZOLOFT) 50 mg tablet, Take 1 tablet (50 mg total) by mouth daily, Disp: 90 tablet, Rfl: 3  •  spironolactone (ALDACTONE) 50 mg tablet, TAKE 1 TABLET BY MOUTH EVERY DAY, Disp: 90 tablet, Rfl: 3     No problem-specific Assessment & Plan notes found for this encounter. Diagnoses and all orders for this visit:    Mild intermittent asthma without complication    Benign essential hypertension  -     amLODIPine (NORVASC) 5 mg tablet; One half tablet daily    Major depressive disorder with single episode, in full remission ([address])    Age-related osteoporosis without current pathological fracture    Needs flu shot  -     influenza vaccine, high-dose, PF 0.7 mL (FLUZONE HIGH-DOSE)        Patient Instructions   Blood pressure is too high today. She was asked to check blood pressures at home and increase dose of amlodipine if blood pressure running above 140. Otherwise, meds stay the same. Mood is well controlled. Not needing steroid inhaler for the most part. Although has Symbicort if needed. Also albuterol for breakthrough wheezing. Flu shot. COVID booster recommended and should be obtained at local drugstore. Recheck in 4 months.

## 2023-09-26 ENCOUNTER — TELEPHONE (OUTPATIENT)
Dept: FAMILY MEDICINE CLINIC | Facility: CLINIC | Age: 68
End: 2023-09-26

## 2023-09-26 ENCOUNTER — OFFICE VISIT (OUTPATIENT)
Dept: URGENT CARE | Age: 68
End: 2023-09-26
Payer: MEDICARE

## 2023-09-26 VITALS
OXYGEN SATURATION: 98 % | RESPIRATION RATE: 18 BRPM | SYSTOLIC BLOOD PRESSURE: 161 MMHG | DIASTOLIC BLOOD PRESSURE: 80 MMHG | TEMPERATURE: 97.4 F | HEART RATE: 74 BPM

## 2023-09-26 DIAGNOSIS — J45.21 MILD INTERMITTENT ASTHMA WITH ACUTE EXACERBATION: Primary | ICD-10-CM

## 2023-09-26 DIAGNOSIS — R06.02 SHORTNESS OF BREATH: ICD-10-CM

## 2023-09-26 PROCEDURE — G0463 HOSPITAL OUTPT CLINIC VISIT: HCPCS | Performed by: EMERGENCY MEDICINE

## 2023-09-26 PROCEDURE — 99213 OFFICE O/P EST LOW 20 MIN: CPT | Performed by: EMERGENCY MEDICINE

## 2023-09-26 RX ORDER — PREDNISONE 20 MG/1
60 TABLET ORAL DAILY
Qty: 12 TABLET | Refills: 0 | Status: SHIPPED | OUTPATIENT
Start: 2023-09-27 | End: 2023-09-26

## 2023-09-26 RX ORDER — ALBUTEROL SULFATE 2.5 MG/3ML
2.5 SOLUTION RESPIRATORY (INHALATION) ONCE
Status: COMPLETED | OUTPATIENT
Start: 2023-09-26 | End: 2023-09-26

## 2023-09-26 RX ORDER — PREDNISONE 20 MG/1
40 TABLET ORAL DAILY
Qty: 8 TABLET | Refills: 0 | Status: SHIPPED | OUTPATIENT
Start: 2023-09-27 | End: 2023-10-01

## 2023-09-26 RX ORDER — PREDNISONE 20 MG/1
60 TABLET ORAL ONCE
Status: COMPLETED | OUTPATIENT
Start: 2023-09-26 | End: 2023-09-26

## 2023-09-26 RX ADMIN — PREDNISONE 60 MG: 20 TABLET ORAL at 11:14

## 2023-09-26 RX ADMIN — ALBUTEROL SULFATE 2.5 MG: 2.5 SOLUTION RESPIRATORY (INHALATION) at 11:14

## 2023-09-26 RX ADMIN — Medication 0.5 MG: at 11:14

## 2023-09-26 NOTE — TELEPHONE ENCOUNTER
Patient called to let you know that when she left here yesterday, later in the day,  she had an asthma attack. She said this usually happens to her in August, September, October. She was asking if you could send in prednisone for her? I did let her know that you were working in another office today and that I didn't know when you would see your messages. Because of this, I said that she may need to go to an urgent care, if her symptoms get worse. She agreed. no clubbing/no cyanosis/no pedal edema

## 2023-09-26 NOTE — PROGRESS NOTES
[facility]        NAME: Gabino Velasquez is a 77 y.o. female  : 1955    MRN: [id number]  DATE: 2023  TIME: 11:19 AM    Assessment and Plan   Mild intermittent asthma with acute exacerbation [J45.21]  1. Mild intermittent asthma with acute exacerbation  ipratropium (ATROVENT) 0.02 % inhalation solution 0.5 mg    albuterol inhalation solution 2.5 mg    predniSONE tablet 60 mg        -Patient reports significant relief of shortness of breath and wheezing after DuoNeb in clinic  -Patient states that she still has adequate supplies of her Symbicort, albuterol MDI and albuterol nebulizer solution at home  -We will treat for asthma exacerbation with short course of oral corticosteroids and instruction given for increased use of albuterol and continue use of Symbicort  -Advised patient to follow-up closely with her PCP and to seek care in the emergency room if she develops worsening shortness of breath, wheezing or chest tightness despite bronchodilator use  -All questions answered at bedside, patient is amenable to plan and voiced understanding  Patient Instructions       Follow up with PCP in 3-5 days. Proceed to  ER if symptoms worsen. Chief Complaint     Chief Complaint   Patient presents with   • Cough     Cough, feeling more SOB. Worse this time of year. Asthma attack last night. Using home inh, did not use nebulizer. History of Present Illness       78-year-old female with history of mild persistent asthma, hypertension, chronic rhinitis presents with chief complaint of sudden onset shortness of breath, wheezing and chest tightness which began yesterday evening. Patient has been using home albuterol MDI and nebulizer solution with relief. She states that around this time of year when the weather changes she typically "gets a flareup" of her asthma.   She states that she has been treated in the emergency room for asthma exacerbations and discharged however has not required hospitalization, ICU stay on mechanical ventilation and intubation for asthma exacerbations in the past.  She denies associated fever, postnasal drip, myalgias or chest pain. States that her  at home recently recovered from a viral URI. Shortness of Breath  The current episode started yesterday. The problem occurs constantly. The problem has been gradually worsening since onset. The problem is moderate. Associated symptoms include coughing, rhinorrhea, a sore throat and wheezing. Pertinent negatives include no chest pain, chest pressure, dizziness, fatigue, hoarseness of voice, leg swelling, orthopnea, palpitations, stridor or sweats. The symptoms are aggravated by allergens. Past treatments include beta-agonist inhalers. The treatment provided moderate relief. Her past medical history is significant for allergies. Review of Systems   Review of Systems   Constitutional: Negative for activity change, appetite change, chills, diaphoresis, fatigue and fever. HENT: Positive for postnasal drip, rhinorrhea, sinus pressure, sinus pain and sore throat. Negative for congestion, dental problem, drooling, ear discharge, ear pain, facial swelling, hearing loss, hoarse voice, mouth sores and nosebleeds. Eyes: Negative for pain, discharge, itching and visual disturbance. Respiratory: Positive for cough, chest tightness, shortness of breath and wheezing. Negative for apnea, choking and stridor. Cardiovascular: Negative for chest pain, palpitations, orthopnea and leg swelling. Gastrointestinal: Negative for abdominal pain, nausea and vomiting. Genitourinary: Negative for decreased urine volume, dyspareunia, dysuria, enuresis, flank pain, frequency, hematuria, pelvic pain, urgency, vaginal bleeding, vaginal discharge and vaginal pain. Musculoskeletal: Negative for arthralgias and back pain. Skin: Negative for color change and rash.    Neurological: Negative for dizziness, seizures and syncope. All other systems reviewed and are negative. Current Medications       Current Outpatient Medications:   •  albuterol (2.5 mg/3 mL) 0.083 % nebulizer solution, Take 3 mL (2.5 mg total) by nebulization every 6 (six) hours as needed for wheezing or shortness of breath, Disp: 180 mL, Rfl: 5  •  albuterol (ProAir HFA) 90 mcg/act inhaler, Inhale 2 puffs every 6 (six) hours as needed for wheezing, Disp: 8.5 g, Rfl: 5  •  alendronate (FOSAMAX) 70 mg tablet, 1 tab weekly on empty stomach in the upright position, Disp: 13 tablet, Rfl: 3  •  Aluminum & Magnesium Hydroxide (MAGNESIUM-ALUMINUM PO), Take by mouth, Disp: , Rfl:   •  amLODIPine (NORVASC) 5 mg tablet, One half tablet daily, Disp: , Rfl:   •  budesonide-formoterol (Symbicort) 80-4.5 MCG/ACT inhaler, Inhale 2 puffs 2 (two) times a day Rinse mouth after use., Disp: 10.2 g, Rfl: 5  •  loratadine (CLARITIN) 10 mg tablet, Take 1 tablet (10 mg total) by mouth daily, Disp: 30 tablet, Rfl: 0  •  metoprolol succinate (TOPROL-XL) 100 mg 24 hr tablet, TAKE 1 TABLET BY MOUTH EVERY DAY, Disp: 90 tablet, Rfl: 3  •  olmesartan (BENICAR) 20 mg tablet, TAKE 1 TABLET BY MOUTH EVERY DAY, Disp: 90 tablet, Rfl: 3  •  Omega-3 1000 MG CAPS, Take by mouth, Disp: , Rfl:   •  sertraline (ZOLOFT) 50 mg tablet, Take 1 tablet (50 mg total) by mouth daily, Disp: 90 tablet, Rfl: 3  •  spironolactone (ALDACTONE) 50 mg tablet, TAKE 1 TABLET BY MOUTH EVERY DAY, Disp: 90 tablet, Rfl: 3  No current facility-administered medications for this visit.     Current Allergies     Allergies as of 09/26/2023   • (No Known Allergies)            The following portions of the patient's history were reviewed and updated as appropriate: allergies, current medications, past family history, past medical history, past social history, past surgical history and problem list.     Past Medical History:   Diagnosis Date   • Age-related osteoporosis without current pathological fracture 02/14/2022   • Anxiety state 12/19/2014   • Benign essential hypertension 01/14/2010   • Chronic rhinitis 01/12/2012   • Depression 2019   • Diverticulitis 72/60/0913    Uncomplicated-treated with Augmentin   • 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 hair loss 02/04/2020   • Fracture of multiple ribs of left side 12/21/2020   • Hyponatremia 10/02/2020   • Mild persistent asthma without complication 48/75/4107   • Personal history of colonic polyps 01/04/2023    Multiple polyps removed 1/4/2023. Recall 3 years. • Right chronic serous otitis media 11/07/2017    Resolved after myringotomy tube   • Scapula fracture 12/21/2020   • Temporal arteritis ([address]) 09/04/2020    Biopsy negative-treated with prednisone for 1 year       Past Surgical History:   Procedure Laterality Date   • COLONOSCOPY     • LAPAROSCOPY      X2 for endometriosis   • VA LIGATION/BIOPSY TEMPORAL ARTERY Left 08/14/2020    Procedure: TEMPORAL ARTERY BIOPSY;  Surgeon: Bernice Hopkins MD;  Location: [address] [place] [address];  Service: General   • TOTAL KNEE ARTHROPLASTY Right 03/28/2022    Dr. Leroy Pastor       Family History   Problem Relation Age of Onset   • Coronary artery disease Mother    • Coronary artery disease Father    • No Known Problems Maternal Grandmother    • No Known Problems Maternal Grandfather    • Leukemia Paternal Grandmother 71   • No Known Problems Paternal Grandfather    • No Known Problems Son    • No Known Problems Son          Medications have been verified. Objective   /80   Pulse 74   Temp (!) 97.4 °F (36.3 °C) (Tympanic)   Resp 18   SpO2 98%   No LMP recorded. Patient is postmenopausal.       Physical Exam     Physical Exam  Vitals and nursing note reviewed. Constitutional:       General: She is not in acute distress. Appearance: Normal appearance. She is normal weight. She is not ill-appearing, toxic-appearing or diaphoretic. HENT:      Head: Normocephalic and atraumatic.       Right Ear: Tympanic membrane, ear canal and external ear normal. There is no impacted cerumen. Left Ear: Tympanic membrane and external ear normal. There is no impacted cerumen. Nose: Congestion and rhinorrhea present. Mouth/Throat:      Mouth: Mucous membranes are moist.      Pharynx: Posterior oropharyngeal erythema present. No oropharyngeal exudate. Eyes:      General: No scleral icterus. Right eye: No discharge. Left eye: No discharge. Extraocular Movements: Extraocular movements intact. Pupils: Pupils are equal, round, and reactive to light. Cardiovascular:      Rate and Rhythm: Normal rate and regular rhythm. Pulses: Normal pulses. Carotid pulses are 2+ on the right side and 2+ on the left side. Radial pulses are 2+ on the right side and 2+ on the left side. Heart sounds: No murmur heard. Pulmonary:      Effort: Pulmonary effort is normal. No tachypnea, accessory muscle usage or respiratory distress. Breath sounds: Decreased air movement present. No stridor. Examination of the right-upper field reveals wheezing. Examination of the left-upper field reveals wheezing. Examination of the right-middle field reveals wheezing. Examination of the left-middle field reveals wheezing. Examination of the right-lower field reveals wheezing. Examination of the left-lower field reveals wheezing. Wheezing present. No rhonchi or rales. Chest:      Chest wall: No tenderness. Abdominal:      General: Abdomen is flat. There is no distension. Palpations: There is no mass. Tenderness: There is no abdominal tenderness. There is no right CVA tenderness, left CVA tenderness, guarding or rebound. Hernia: No hernia is present. Musculoskeletal:      Cervical back: Normal range of motion and neck supple. Right lower leg: No edema. Left lower leg: No edema. Skin:     General: Skin is warm and dry. Neurological:      General: No focal deficit present.       Mental Status: She is alert and oriented to person, place, and time. Cranial Nerves: No cranial nerve deficit. Sensory: No sensory deficit. Motor: No weakness.       Coordination: Coordination normal.      Gait: Gait normal.      Deep Tendon Reflexes: Reflexes normal.   Psychiatric:         Mood and Affect: Mood normal.

## 2023-10-17 DIAGNOSIS — M81.0 AGE-RELATED OSTEOPOROSIS WITHOUT CURRENT PATHOLOGICAL FRACTURE: ICD-10-CM

## 2023-10-17 RX ORDER — ALENDRONATE SODIUM 70 MG/1
TABLET ORAL
Qty: 12 TABLET | Refills: 4 | Status: SHIPPED | OUTPATIENT
Start: 2023-10-17

## 2023-12-18 DIAGNOSIS — J45.21 MILD INTERMITTENT ASTHMA WITH ACUTE EXACERBATION: ICD-10-CM

## 2023-12-18 RX ORDER — ALBUTEROL SULFATE 2.5 MG/3ML
2.5 SOLUTION RESPIRATORY (INHALATION) EVERY 6 HOURS PRN
Qty: 180 ML | Refills: 0 | Status: SHIPPED | OUTPATIENT
Start: 2023-12-18

## 2023-12-20 DIAGNOSIS — I10 BENIGN ESSENTIAL HYPERTENSION: ICD-10-CM

## 2023-12-20 RX ORDER — OLMESARTAN MEDOXOMIL 20 MG/1
TABLET ORAL
Qty: 90 TABLET | Refills: 0 | Status: SHIPPED | OUTPATIENT
Start: 2023-12-20

## 2023-12-20 RX ORDER — AMLODIPINE BESYLATE 5 MG/1
TABLET ORAL
Qty: 135 TABLET | Refills: 3 | Status: SHIPPED | OUTPATIENT
Start: 2023-12-20

## 2024-01-24 DIAGNOSIS — I10 UNCONTROLLED HYPERTENSION: ICD-10-CM

## 2024-01-24 RX ORDER — METOPROLOL SUCCINATE 100 MG/1
TABLET, EXTENDED RELEASE ORAL
Qty: 90 TABLET | Refills: 1 | Status: SHIPPED | OUTPATIENT
Start: 2024-01-24 | End: 2024-01-25 | Stop reason: SDUPTHER

## 2024-01-25 ENCOUNTER — LAB (OUTPATIENT)
Dept: LAB | Facility: CLINIC | Age: 69
End: 2024-01-25
Payer: MEDICARE

## 2024-01-25 ENCOUNTER — OFFICE VISIT (OUTPATIENT)
Dept: FAMILY MEDICINE CLINIC | Facility: CLINIC | Age: 69
End: 2024-01-25
Payer: MEDICARE

## 2024-01-25 VITALS
SYSTOLIC BLOOD PRESSURE: 130 MMHG | HEIGHT: 63 IN | HEART RATE: 61 BPM | OXYGEN SATURATION: 99 % | BODY MASS INDEX: 32.21 KG/M2 | DIASTOLIC BLOOD PRESSURE: 74 MMHG | TEMPERATURE: 98 F | WEIGHT: 181.8 LBS

## 2024-01-25 DIAGNOSIS — I10 UNCONTROLLED HYPERTENSION: ICD-10-CM

## 2024-01-25 DIAGNOSIS — F41.1 ANXIETY STATE: ICD-10-CM

## 2024-01-25 DIAGNOSIS — I10 BENIGN ESSENTIAL HYPERTENSION: ICD-10-CM

## 2024-01-25 DIAGNOSIS — F33.1 MODERATE EPISODE OF RECURRENT MAJOR DEPRESSIVE DISORDER (HCC): ICD-10-CM

## 2024-01-25 DIAGNOSIS — F32.5 MAJOR DEPRESSIVE DISORDER WITH SINGLE EPISODE, IN FULL REMISSION (HCC): ICD-10-CM

## 2024-01-25 DIAGNOSIS — I10 BENIGN ESSENTIAL HYPERTENSION: Primary | ICD-10-CM

## 2024-01-25 DIAGNOSIS — J45.20 MILD INTERMITTENT ASTHMA WITHOUT COMPLICATION: ICD-10-CM

## 2024-01-25 LAB
ALBUMIN SERPL BCP-MCNC: 4.9 G/DL (ref 3.5–5)
ALP SERPL-CCNC: 41 U/L (ref 34–104)
ALT SERPL W P-5'-P-CCNC: 12 U/L (ref 7–52)
ANION GAP SERPL CALCULATED.3IONS-SCNC: 10 MMOL/L
AST SERPL W P-5'-P-CCNC: 14 U/L (ref 13–39)
BILIRUB SERPL-MCNC: 0.57 MG/DL (ref 0.2–1)
BUN SERPL-MCNC: 17 MG/DL (ref 5–25)
CALCIUM SERPL-MCNC: 10 MG/DL (ref 8.4–10.2)
CHLORIDE SERPL-SCNC: 95 MMOL/L (ref 96–108)
CHOLEST SERPL-MCNC: 244 MG/DL
CO2 SERPL-SCNC: 29 MMOL/L (ref 21–32)
CREAT SERPL-MCNC: 0.78 MG/DL (ref 0.6–1.3)
GFR SERPL CREATININE-BSD FRML MDRD: 76 ML/MIN/1.73SQ M
GLUCOSE P FAST SERPL-MCNC: 95 MG/DL (ref 65–99)
HDLC SERPL-MCNC: 65 MG/DL
LDLC SERPL CALC-MCNC: 162 MG/DL (ref 0–100)
NONHDLC SERPL-MCNC: 179 MG/DL
POTASSIUM SERPL-SCNC: 5.2 MMOL/L (ref 3.5–5.3)
PROT SERPL-MCNC: 7.4 G/DL (ref 6.4–8.4)
SODIUM SERPL-SCNC: 134 MMOL/L (ref 135–147)
TRIGL SERPL-MCNC: 87 MG/DL
TSH SERPL DL<=0.05 MIU/L-ACNC: 2.06 UIU/ML (ref 0.45–4.5)

## 2024-01-25 PROCEDURE — 84443 ASSAY THYROID STIM HORMONE: CPT

## 2024-01-25 PROCEDURE — 80061 LIPID PANEL: CPT

## 2024-01-25 PROCEDURE — 36415 COLL VENOUS BLD VENIPUNCTURE: CPT

## 2024-01-25 PROCEDURE — 99214 OFFICE O/P EST MOD 30 MIN: CPT | Performed by: FAMILY MEDICINE

## 2024-01-25 PROCEDURE — 80053 COMPREHEN METABOLIC PANEL: CPT

## 2024-01-25 RX ORDER — METOPROLOL SUCCINATE 100 MG/1
100 TABLET, EXTENDED RELEASE ORAL DAILY
Qty: 90 TABLET | Refills: 3 | Status: SHIPPED | OUTPATIENT
Start: 2024-01-25

## 2024-01-25 NOTE — PATIENT INSTRUCTIONS
Blood pressure adequately controlled.  Medicines are unchanged.  Continue sertraline for mood.  Recheck blood work to include lipid profile, sodium, blood sugar, and kidney function.  Also check thyroid.  Recommend shingles vaccine at local drugstore.  2 shots  by 2-6 months.  Recheck 6 months.

## 2024-01-25 NOTE — PROGRESS NOTES
Chief Complaint   Patient presents with    Follow-up        HPI   Here for follow-up of hypertension, hyponatremia, temporal arteritis, and depression. And knee DJD.   Had her 69st birthday.  Jose Juan getting  in August.  Using albuterol inhaler once a day.  Occasional coughing and wheezing.   just diagnosed with recurrence of his B-cell lymphoma which was first diagnosed and treated in 2013.  Has received initial round of chemotherapy and so far doing okay.    Past Medical History:   Diagnosis Date    Age-related osteoporosis without current pathological fracture 02/14/2022    Anxiety state 12/19/2014    Benign essential hypertension 01/14/2010    Chronic rhinitis 01/12/2012    Depression 2019    Diverticulitis 01/07/2023    Uncomplicated-treated with Augmentin    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 hair loss 02/04/2020    Fracture of multiple ribs of left side 12/21/2020    Hyponatremia 10/02/2020    Mild persistent asthma without complication 09/07/2017    Personal history of colonic polyps 01/04/2023    Multiple polyps removed 1/4/2023.  Recall 3 years.    Right chronic serous otitis media 11/07/2017    Resolved after myringotomy tube    Scapula fracture 12/21/2020    Temporal arteritis (HCC) 09/04/2020    Biopsy negative-treated with prednisone for 1 year        Past Surgical History:   Procedure Laterality Date    COLONOSCOPY      LAPAROSCOPY      X2 for endometriosis    RI LIGATION/BIOPSY TEMPORAL ARTERY Left 08/14/2020    Procedure: TEMPORAL ARTERY BIOPSY;  Surgeon: Yosvany Hardy MD;  Location:  MAIN OR;  Service: General    TOTAL KNEE ARTHROPLASTY Right 03/28/2022    Dr. Lake       Social History     Tobacco Use    Smoking status: Never    Smokeless tobacco: Never   Substance Use Topics    Alcohol use: Yes     Comment: socially       Social History     Social History Narrative     since 1981.      Two sons.      Retired in 2018.  Was a .     is a retired teacher.    Enjoys reading,  "gardening, walking, needle work, painting, writing books.    9/23-Richard living in [place].  Jose Juan engaged living in [place].Getting   August 10, 2024.        The following portions of the patient's history were reviewed and updated as appropriate: allergies, current medications, past family history, past medical history, past social history, past surgical history, and problem list.      Review of Systems       /74 (BP Location: Left arm, Patient Position: Sitting, Cuff Size: Standard)   Pulse 61   Temp 98 °F (36.7 °C) (Temporal)   Ht 5' 3\" (1.6 m)   Wt 82.5 kg (181 lb 12.8 oz)   SpO2 99%   BMI 32.20 kg/m²      Physical Exam   Appears well.  Repeat blood pressure 146/80.  Lungs are clear.  Heart regular.  Mood is okay.  Affect appropriate.              Current Outpatient Medications:     albuterol (2.5 mg/3 mL) 0.083 % nebulizer solution, Take 3 mL (2.5 mg total) by nebulization every 6 (six) hours as needed for wheezing or shortness of breath, Disp: 180 mL, Rfl: 0    albuterol (ProAir HFA) 90 mcg/act inhaler, Inhale 2 puffs every 6 (six) hours as needed for wheezing, Disp: 8.5 g, Rfl: 5    alendronate (FOSAMAX) 70 mg tablet, TAKE 1 TAB WEEKLY ON EMPTY STOMACH IN THE UPRIGHT POSITION, Disp: 12 tablet, Rfl: 4    Aluminum & Magnesium Hydroxide (MAGNESIUM-ALUMINUM PO), Take by mouth, Disp: , Rfl:     amLODIPine (NORVASC) 5 mg tablet, One half tablet daily, Disp: 135 tablet, Rfl: 3    budesonide-formoterol (Symbicort) 80-4.5 MCG/ACT inhaler, Inhale 2 puffs 2 (two) times a day Rinse mouth after use., Disp: 10.2 g, Rfl: 5    loratadine (CLARITIN) 10 mg tablet, Take 1 tablet (10 mg total) by mouth daily, Disp: 30 tablet, Rfl: 0    metoprolol succinate (TOPROL-XL) 100 mg 24 hr tablet, Take 1 tablet (100 mg total) by mouth daily, Disp: 90 tablet, Rfl: 3    olmesartan (BENICAR) 20 mg tablet, TAKE 1 TABLET BY MOUTH EVERY DAY, Disp: 90 tablet, Rfl: 0    Omega-3 1000 MG CAPS, Take by mouth, Disp: , Rfl:    "  sertraline (ZOLOFT) 50 mg tablet, Take 1 tablet (50 mg total) by mouth daily, Disp: 90 tablet, Rfl: 3    spironolactone (ALDACTONE) 50 mg tablet, TAKE 1 TABLET BY MOUTH EVERY DAY, Disp: 90 tablet, Rfl: 3     No problem-specific Assessment & Plan notes found for this encounter.       Diagnoses and all orders for this visit:    Benign essential hypertension  -     Comprehensive metabolic panel; Future  -     Lipid panel; Future  -     TSH, 3rd generation with Free T4 reflex; Future    Uncontrolled hypertension  -     metoprolol succinate (TOPROL-XL) 100 mg 24 hr tablet; Take 1 tablet (100 mg total) by mouth daily    Mild intermittent asthma without complication    Major depressive disorder with single episode, in full remission (HCC)    Anxiety state    Moderate episode of recurrent major depressive disorder (HCC)        Patient Instructions   Blood pressure adequately controlled.  Medicines are unchanged.  Continue sertraline for mood.  Recheck blood work to include lipid profile, sodium, blood sugar, and kidney function.  Also check thyroid.  Recommend shingles vaccine at local drugstore.  2 shots  by 2-6 months.  Recheck 6 months.

## 2024-01-26 NOTE — RESULT ENCOUNTER NOTE
Chemistry is normal including a sodium of 134.  Cholesterol okay with a high HDL, the protective kind.  Thyroid normal.

## 2024-01-31 DIAGNOSIS — I10 BENIGN ESSENTIAL HYPERTENSION: ICD-10-CM

## 2024-01-31 RX ORDER — OLMESARTAN MEDOXOMIL 20 MG/1
TABLET ORAL
Qty: 90 TABLET | Refills: 1 | Status: SHIPPED | OUTPATIENT
Start: 2024-01-31

## 2024-04-19 ENCOUNTER — OFFICE VISIT (OUTPATIENT)
Dept: URGENT CARE | Age: 69
End: 2024-04-19
Payer: MEDICARE

## 2024-04-19 VITALS
SYSTOLIC BLOOD PRESSURE: 140 MMHG | BODY MASS INDEX: 31.89 KG/M2 | WEIGHT: 180 LBS | HEART RATE: 59 BPM | TEMPERATURE: 98 F | RESPIRATION RATE: 18 BRPM | HEIGHT: 63 IN | DIASTOLIC BLOOD PRESSURE: 80 MMHG | OXYGEN SATURATION: 98 %

## 2024-04-19 DIAGNOSIS — J45.901 ASTHMA WITH ACUTE EXACERBATION, UNSPECIFIED ASTHMA SEVERITY, UNSPECIFIED WHETHER PERSISTENT: Primary | ICD-10-CM

## 2024-04-19 PROCEDURE — 99213 OFFICE O/P EST LOW 20 MIN: CPT | Performed by: EMERGENCY MEDICINE

## 2024-04-19 PROCEDURE — G0463 HOSPITAL OUTPT CLINIC VISIT: HCPCS | Performed by: EMERGENCY MEDICINE

## 2024-04-19 RX ORDER — PREDNISONE 50 MG/1
50 TABLET ORAL DAILY
Qty: 5 TABLET | Refills: 0 | Status: SHIPPED | OUTPATIENT
Start: 2024-04-19 | End: 2024-04-24

## 2024-04-19 NOTE — PATIENT INSTRUCTIONS
Start prednisone as prescribed  Continue the use of albuterol as needed  Follow up with PCP in 3-5 days.  Proceed to  ER if symptoms worsen.    If tests are performed, our office will contact you with results only if changes need to made to the care plan discussed with you at the visit. You can review your full results on [facility].

## 2024-04-19 NOTE — PROGRESS NOTES
[facility]        NAME: Renetta Velasquez is a 69 y.o. female  : 1955    MRN: [id number]  DATE: 2024  TIME: 10:58 AM    Assessment and Plan   Asthma with acute exacerbation, unspecified asthma severity, unspecified whether persistent [J45.901]  1. Asthma with acute exacerbation, unspecified asthma severity, unspecified whether persistent  predniSONE 50 mg tablet            Patient Instructions     Start prednisone as prescribed  Continue the use of albuterol as needed  Follow up with PCP in 3-5 days.  Proceed to  ER if symptoms worsen.    If tests are performed, our office will contact you with results only if changes need to made to the care plan discussed with you at the visit. [facility]    Chief Complaint     Chief Complaint   Patient presents with    asthma flare     Patient reports has a history of asthma, has had a flare up for the past 2 days. Coughing, trouble breathing and has been using neb treatments x2 daily since started. Patient reports that neb treatment does work for a few hours but then wheezing starts again.          History of Present Illness       Patient is a 68 yo female with significant PMH of asthma and seasonal allergies presenting in the clinic today for cough x 2 days. Admits cough, SOB, and wheezing. Denies fever, chills, body aches, fatigue, chest tightness, chest pain, n/v/d. Admits the use of albuterol nebulizer for sx management. Denies recent sick contacts.        Review of Systems   Review of Systems   Constitutional:  Negative for chills, fatigue and fever.   HENT:  Negative for congestion, ear pain, postnasal drip, rhinorrhea, sinus pressure, sinus pain and sore throat.    Respiratory:  Positive for cough, shortness of breath and wheezing. Negative for chest tightness.    Cardiovascular:  Negative for chest pain.   Gastrointestinal:  Negative for abdominal pain, diarrhea, nausea and vomiting.    Musculoskeletal:  Negative for myalgias.   Skin:  Negative for rash.   Neurological:  Negative for headaches.         Current Medications       Current Outpatient Medications:     predniSONE 50 mg tablet, Take 1 tablet (50 mg total) by mouth daily for 5 days, Disp: 5 tablet, Rfl: 0    albuterol (2.5 mg/3 mL) 0.083 % nebulizer solution, Take 3 mL (2.5 mg total) by nebulization every 6 (six) hours as needed for wheezing or shortness of breath, Disp: 180 mL, Rfl: 0    albuterol (ProAir HFA) 90 mcg/act inhaler, Inhale 2 puffs every 6 (six) hours as needed for wheezing, Disp: 8.5 g, Rfl: 5    alendronate (FOSAMAX) 70 mg tablet, TAKE 1 TAB WEEKLY ON EMPTY STOMACH IN THE UPRIGHT POSITION, Disp: 12 tablet, Rfl: 4    Aluminum & Magnesium Hydroxide (MAGNESIUM-ALUMINUM PO), Take by mouth, Disp: , Rfl:     amLODIPine (NORVASC) 5 mg tablet, One half tablet daily, Disp: 135 tablet, Rfl: 3    budesonide-formoterol (Symbicort) 80-4.5 MCG/ACT inhaler, Inhale 2 puffs 2 (two) times a day Rinse mouth after use., Disp: 10.2 g, Rfl: 5    loratadine (CLARITIN) 10 mg tablet, Take 1 tablet (10 mg total) by mouth daily, Disp: 30 tablet, Rfl: 0    metoprolol succinate (TOPROL-XL) 100 mg 24 hr tablet, Take 1 tablet (100 mg total) by mouth daily, Disp: 90 tablet, Rfl: 3    olmesartan (BENICAR) 20 mg tablet, TAKE 1 TABLET BY MOUTH EVERY DAY, Disp: 90 tablet, Rfl: 1    Omega-3 1000 MG CAPS, Take by mouth, Disp: , Rfl:     sertraline (ZOLOFT) 50 mg tablet, Take 1 tablet (50 mg total) by mouth daily, Disp: 90 tablet, Rfl: 3    spironolactone (ALDACTONE) 50 mg tablet, TAKE 1 TABLET BY MOUTH EVERY DAY, Disp: 90 tablet, Rfl: 3    Current Allergies     Allergies as of 04/19/2024    (No Known Allergies)            The following portions of the patient's history were reviewed and updated as appropriate: allergies, current medications, past family history, past medical history, past social history, past surgical history and problem list.     Past  "Medical History:   Diagnosis Date    Age-related osteoporosis without current pathological fracture 02/14/2022    Anxiety state 12/19/2014    Benign essential hypertension 01/14/2010    Chronic rhinitis 01/12/2012    Depression 2019    Diverticulitis 01/07/2023    Uncomplicated-treated with Augmentin    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 hair loss 02/04/2020    Fracture of multiple ribs of left side 12/21/2020    Hyponatremia 10/02/2020    Mild persistent asthma without complication 09/07/2017    Personal history of colonic polyps 01/04/2023    Multiple polyps removed 1/4/2023.  Recall 3 years.    Right chronic serous otitis media 11/07/2017    Resolved after myringotomy tube    Scapula fracture 12/21/2020    Temporal arteritis (HCC) 09/04/2020    Biopsy negative-treated with prednisone for 1 year       Past Surgical History:   Procedure Laterality Date    COLONOSCOPY      LAPAROSCOPY      X2 for endometriosis    MD LIGATION/BIOPSY TEMPORAL ARTERY Left 08/14/2020    Procedure: TEMPORAL ARTERY BIOPSY;  Surgeon: Yosvany Hardy MD;  Location: [facility] OR;  Service: General    TOTAL KNEE ARTHROPLASTY Right 03/28/2022    Dr. Lake       Family History   Problem Relation Age of Onset    Coronary artery disease Mother     Coronary artery disease Father     No Known Problems Maternal Grandmother     No Known Problems Maternal Grandfather     Leukemia Paternal Grandmother 69    No Known Problems Paternal Grandfather     No Known Problems Son     No Known Problems Son          Medications have been verified.        Objective   /80   Pulse 59   Temp 98 °F (36.7 °C) (Tympanic)   Resp 18   Ht 5' 3\" (1.6 m)   Wt 81.6 kg (180 lb)   SpO2 98%   BMI 31.89 kg/m²        Physical Exam     Physical Exam  Vitals reviewed.   Constitutional:       General: She is not in acute distress.     Appearance: Normal appearance. She is normal weight. She is not ill-appearing.      Comments: NAD. No tripoding noted.   HENT:      Head: Normocephalic.      " Right Ear: Hearing normal.      Left Ear: Hearing normal.      Nose: Nose normal. No congestion or rhinorrhea.      Mouth/Throat:      Lips: Pink.      Mouth: Mucous membranes are moist.      Pharynx: Oropharynx is clear. Uvula midline. No pharyngeal swelling, oropharyngeal exudate, posterior oropharyngeal erythema or uvula swelling.   Eyes:      General:         Right eye: No discharge.         Left eye: No discharge.      Conjunctiva/sclera: Conjunctivae normal.   Cardiovascular:      Rate and Rhythm: Normal rate and regular rhythm.      Pulses: Normal pulses.      Heart sounds: Normal heart sounds. No murmur heard.     No friction rub. No gallop.   Pulmonary:      Effort: Pulmonary effort is normal. No respiratory distress.      Breath sounds: No stridor. Wheezing present. No rhonchi or rales.   Musculoskeletal:      Cervical back: Normal range of motion and neck supple. No tenderness.   Lymphadenopathy:      Cervical: No cervical adenopathy.   Skin:     General: Skin is warm.      Findings: No rash.   Neurological:      Mental Status: She is alert.   Psychiatric:         Mood and Affect: Mood normal.         Behavior: Behavior normal.

## 2024-05-06 DIAGNOSIS — I10 BENIGN ESSENTIAL HYPERTENSION: ICD-10-CM

## 2024-05-07 RX ORDER — AMLODIPINE BESYLATE 5 MG/1
TABLET ORAL
Qty: 135 TABLET | Refills: 3 | Status: SHIPPED | OUTPATIENT
Start: 2024-05-07

## 2024-05-07 RX ORDER — SPIRONOLACTONE 50 MG/1
TABLET, FILM COATED ORAL
Qty: 90 TABLET | Refills: 1 | Status: SHIPPED | OUTPATIENT
Start: 2024-05-07

## 2024-06-10 ENCOUNTER — OFFICE VISIT (OUTPATIENT)
Dept: FAMILY MEDICINE CLINIC | Facility: CLINIC | Age: 69
End: 2024-06-10
Payer: MEDICARE

## 2024-06-10 VITALS
WEIGHT: 183.8 LBS | HEIGHT: 63 IN | BODY MASS INDEX: 32.57 KG/M2 | HEART RATE: 64 BPM | OXYGEN SATURATION: 96 % | SYSTOLIC BLOOD PRESSURE: 128 MMHG | TEMPERATURE: 96.4 F | DIASTOLIC BLOOD PRESSURE: 74 MMHG

## 2024-06-10 DIAGNOSIS — Z12.31 ENCOUNTER FOR SCREENING MAMMOGRAM FOR BREAST CANCER: ICD-10-CM

## 2024-06-10 DIAGNOSIS — I10 BENIGN ESSENTIAL HYPERTENSION: ICD-10-CM

## 2024-06-10 DIAGNOSIS — M81.0 AGE-RELATED OSTEOPOROSIS WITHOUT CURRENT PATHOLOGICAL FRACTURE: ICD-10-CM

## 2024-06-10 DIAGNOSIS — F33.1 MODERATE EPISODE OF RECURRENT MAJOR DEPRESSIVE DISORDER (HCC): ICD-10-CM

## 2024-06-10 DIAGNOSIS — F32.5 MAJOR DEPRESSIVE DISORDER WITH SINGLE EPISODE, IN FULL REMISSION (HCC): ICD-10-CM

## 2024-06-10 DIAGNOSIS — J45.20 MILD INTERMITTENT ASTHMA WITHOUT COMPLICATION: ICD-10-CM

## 2024-06-10 DIAGNOSIS — Z00.00 MEDICARE ANNUAL WELLNESS VISIT, SUBSEQUENT: Primary | ICD-10-CM

## 2024-06-10 PROBLEM — M17.11 PRIMARY OSTEOARTHRITIS OF RIGHT KNEE: Status: RESOLVED | Noted: 2021-07-08 | Resolved: 2024-06-10

## 2024-06-10 PROCEDURE — G0439 PPPS, SUBSEQ VISIT: HCPCS | Performed by: FAMILY MEDICINE

## 2024-06-10 PROCEDURE — 99214 OFFICE O/P EST MOD 30 MIN: CPT | Performed by: FAMILY MEDICINE

## 2024-06-10 RX ORDER — OLMESARTAN MEDOXOMIL 20 MG/1
TABLET ORAL
Qty: 90 TABLET | Refills: 3 | Status: SHIPPED | OUTPATIENT
Start: 2024-06-10

## 2024-06-10 RX ORDER — AMLODIPINE BESYLATE 5 MG/1
TABLET ORAL
Start: 2024-06-10

## 2024-06-10 NOTE — PROGRESS NOTES
Ambulatory Visit  Name: Renetta Velasquez      : 1955      MRN: [id number]  Encounter Provider: Roman Zayas MD  Encounter Date: 6/10/2024   Encounter department: [facility]    Assessment & Plan   1. Medicare annual wellness visit, subsequent  2. Benign essential hypertension       Preventive health issues were discussed with patient, and age appropriate screening tests were ordered as noted in patient's After Visit Summary. Personalized health advice and appropriate referrals for health education or preventive services given if needed, as noted in patient's After Visit Summary.    History of Present Illness     HPI   Patient Care Team:  Roman Zayas MD as PCP - General (Family Medicine)    Review of Systems  Medical History Reviewed by provider this encounter:       Annual Wellness Visit Questionnaire   Renetta is here for her Subsequent Wellness visit.     Health Risk Assessment:   Patient rates overall health as good. Patient feels that their physical health rating is slightly worse. Patient is satisfied with their life. Eyesight was rated as slightly worse. Hearing was rated as same. Patient feels that their emotional and mental health rating is slightly worse. Patients states they are never, rarely angry. Patient states they are never, rarely unusually tired/fatigued. Pain experienced in the last 7 days has been some. Patient's pain rating has been 2/10. Patient states that she has experienced weight loss or gain in last 6 months.     Depression Screening:   PHQ-9 Score: 11      Fall Risk Screening:   In the past year, patient has experienced: no history of falling in past year      Urinary Incontinence Screening:   Patient has leaked urine accidently in the last six months.     Home Safety:  Patient has trouble with stairs inside or outside of their home. Patient has working smoke alarms and has working carbon monoxide detector. Home safety hazards include: none.     Nutrition:    Current diet is Regular.     Medications:   Patient is currently taking over-the-counter supplements. OTC medications include: see medication list. Patient is able to manage medications.     Activities of Daily Living (ADLs)/Instrumental Activities of Daily Living (IADLs):   Walk and transfer into and out of bed and chair?: Yes  Dress and groom yourself?: Yes    Bathe or shower yourself?: Yes    Feed yourself? Yes  Do your laundry/housekeeping?: Yes  Manage your money, pay your bills and track your expenses?: Yes  Make your own meals?: Yes    Do your own shopping?: Yes    Previous Hospitalizations:   Any hospitalizations or ED visits within the last 12 months?: No      Advance Care Planning:   Living will: Yes    Advanced directive: Yes      PREVENTIVE SCREENINGS      Cardiovascular Screening:    General: Screening Current      Diabetes Screening:     General: Screening Current      Colorectal Cancer Screening:     General: Screening Current      Cervical Cancer Screening:    General: Screening Not Indicated      Osteoporosis Screening:    General: Screening Not Indicated and History Osteoporosis      Lung Cancer Screening:     General: Screening Not Indicated      Hepatitis C Screening:    General: Screening Current    Screening, Brief Intervention, and Referral to Treatment (SBIRT)    Screening      Single Item Drug Screening:  How often have you used an illegal drug (including marijuana) or a prescription medication for non-medical reasons in the past year? never    Single Item Drug Screen Score: 0  Interpretation: Negative screen for possible drug use disorder    Social Determinants of Health     Financial Resource Strain: Low Risk  (2/23/2023)    Overall Financial Resource Strain (CARDIA)     Difficulty of Paying Living Expenses: Not hard at all   Transportation Needs: No Transportation Needs (2/23/2023)    PRAPARE - Transportation     Lack of Transportation (Medical): No     Lack of Transportation  "(Non-Medical): No     No results found.    Objective     Pulse 64   Temp (!) 96.4 °F (35.8 °C) (Temporal)   Ht 5' 3\" (1.6 m)   Wt 83.4 kg (183 lb 12.8 oz)   SpO2 96%   BMI 32.56 kg/m²     Physical Exam  Administrative Statements           "

## 2024-06-10 NOTE — PATIENT INSTRUCTIONS
Medicare wellness exam was completed.  Blood pressure is nicely controlled.  Last sodium was okay.  Lipids are excellent.  Medications are reviewed and stay the same.  Another prescription given for mammogram.  Increase sertraline to 75 mg daily.  After 1 month if not feeling wonderful, increase to 100 mg.  Recheck in 6 months.    Medicare Preventive Visit Patient Instructions  Thank you for completing your Welcome to Medicare Visit or Medicare Annual Wellness Visit today. Your next wellness visit will be due in one year (6/11/2025).  The screening/preventive services that you may require over the next 5-10 years are detailed below. Some tests may not apply to you based off risk factors and/or age. Screening tests ordered at today's visit but not completed yet may show as past due. Also, please note that scanned in results may not display below.  Preventive Screenings:  Service Recommendations Previous Testing/Comments   Colorectal Cancer Screening  * Colonoscopy    * Fecal Occult Blood Test (FOBT)/Fecal Immunochemical Test (FIT)  * Fecal DNA/Cologuard Test  * Flexible Sigmoidoscopy Age: 45-75 years old   Colonoscopy: every 10 years (may be performed more frequently if at higher risk)  OR  FOBT/FIT: every 1 year  OR  Cologuard: every 3 years  OR  Sigmoidoscopy: every 5 years  Screening may be recommended earlier than age 45 if at higher risk for colorectal cancer. Also, an individualized decision between you and your healthcare provider will decide whether screening between the ages of 76-85 would be appropriate. Colonoscopy: 01/04/2023  FOBT/FIT: Not on file  Cologuard: 11/14/2022  Sigmoidoscopy: Not on file    Screening Current     Breast Cancer Screening Age: 40+ years old  Frequency: every 1-2 years  Not required if history of left and right mastectomy Mammogram: 07/09/2021        Cervical Cancer Screening Between the ages of 21-29, pap smear recommended once every 3 years.   Between the ages of 30-65, can  perform pap smear with HPV co-testing every 5 years.   Recommendations may differ for women with a history of total hysterectomy, cervical cancer, or abnormal pap smears in past. Pap Smear: Not on file    Screening Not Indicated   Hepatitis C Screening Once for adults born between 1945 and 1965  More frequently in patients at high risk for Hepatitis C Hep C Antibody: 10/28/2019    Screening Current   Diabetes Screening 1-2 times per year if you're at risk for diabetes or have pre-diabetes Fasting glucose: 95 mg/dL (1/25/2024)  A1C: No results in last 5 years (No results in last 5 years)  Screening Current   Cholesterol Screening Once every 5 years if you don't have a lipid disorder. May order more often based on risk factors. Lipid panel: 01/25/2024    Screening Current     Other Preventive Screenings Covered by Medicare:  Abdominal Aortic Aneurysm (AAA) Screening: covered once if your at risk. You're considered to be at risk if you have a family history of AAA.  Lung Cancer Screening: covers low dose CT scan once per year if you meet all of the following conditions: (1) Age 55-77; (2) No signs or symptoms of lung cancer; (3) Current smoker or have quit smoking within the last 15 years; (4) You have a tobacco smoking history of at least 20 pack years (packs per day multiplied by number of years you smoked); (5) You get a written order from a healthcare provider.  Glaucoma Screening: covered annually if you're considered high risk: (1) You have diabetes OR (2) Family history of glaucoma OR (3)  aged 50 and older OR (4)  American aged 65 and older  Osteoporosis Screening: covered every 2 years if you meet one of the following conditions: (1) You're estrogen deficient and at risk for osteoporosis based off medical history and other findings; (2) Have a vertebral abnormality; (3) On glucocorticoid therapy for more than 3 months; (4) Have primary hyperparathyroidism; (5) On osteoporosis  medications and need to assess response to drug therapy.   Last bone density test (DXA Scan): 09/17/2020.  HIV Screening: covered annually if you're between the age of 15-65. Also covered annually if you are younger than 15 and older than 65 with risk factors for HIV infection. For pregnant patients, it is covered up to 3 times per pregnancy.    Immunizations:  Immunization Recommendations   Influenza Vaccine Annual influenza vaccination during flu season is recommended for all persons aged >= 6 months who do not have contraindications   Pneumococcal Vaccine   * Pneumococcal conjugate vaccine = PCV13 (Prevnar 13), PCV15 (Vaxneuvance), PCV20 (Prevnar 20)  * Pneumococcal polysaccharide vaccine = PPSV23 (Pneumovax) Adults 19-63 yo with certain risk factors or if 65+ yo  If never received any pneumonia vaccine: recommend Prevnar 20 (PCV20)  Give PCV20 if previously received 1 dose of PCV13 or PPSV23   Hepatitis B Vaccine 3 dose series if at intermediate or high risk (ex: diabetes, end stage renal disease, liver disease)   Respiratory syncytial virus (RSV) Vaccine - COVERED BY MEDICARE PART D  * RSVPreF3 (Arexvy) CDC recommends that adults 60 years of age and older may receive a single dose of RSV vaccine using shared clinical decision-making (SCDM)   Tetanus (Td) Vaccine - COST NOT COVERED BY MEDICARE PART B Following completion of primary series, a booster dose should be given every 10 years to maintain immunity against tetanus. Td may also be given as tetanus wound prophylaxis.   Tdap Vaccine - COST NOT COVERED BY MEDICARE PART B Recommended at least once for all adults. For pregnant patients, recommended with each pregnancy.   Shingles Vaccine (Shingrix) - COST NOT COVERED BY MEDICARE PART B  2 shot series recommended in those 19 years and older who have or will have weakened immune systems or those 50 years and older     Health Maintenance Due:      Topic Date Due    Breast Cancer Screening: Mammogram  07/09/2022     Colorectal Cancer Screening  01/03/2026    Hepatitis C Screening  Completed     Immunizations Due:      Topic Date Due    Hepatitis A Vaccine (1 of 2 - Risk 2-dose series) Never done    COVID-19 Vaccine (4 - 2023-24 season) 09/01/2023     Advance Directives   What are advance directives?  Advance directives are legal documents that state your wishes and plans for medical care. These plans are made ahead of time in case you lose your ability to make decisions for yourself. Advance directives can apply to any medical decision, such as the treatments you want, and if you want to donate organs.   What are the types of advance directives?  There are many types of advance directives, and each state has rules about how to use them. You may choose a combination of any of the following:  Living will:  This is a written record of the treatment you want. You can also choose which treatments you do not want, which to limit, and which to stop at a certain time. This includes surgery, medicine, IV fluid, and tube feedings.   Durable power of  for healthcare (DPAHC):  This is a written record that states who you want to make healthcare choices for you when you are unable to make them for yourself. This person, called a proxy, is usually a family member or a friend. You may choose more than 1 proxy.  Do not resuscitate (DNR) order:  A DNR order is used in case your heart stops beating or you stop breathing. It is a request not to have certain forms of treatment, such as CPR. A DNR order may be included in other types of advance directives.  Medical directive:  This covers the care that you want if you are in a coma, near death, or unable to make decisions for yourself. You can list the treatments you want for each condition. Treatment may include pain medicine, surgery, blood transfusions, dialysis, IV or tube feedings, and a ventilator (breathing machine).  Values history:  This document has questions about your views,  beliefs, and how you feel and think about life. This information can help others choose the care that you would choose.  Why are advance directives important?  An advance directive helps you control your care. Although spoken wishes may be used, it is better to have your wishes written down. Spoken wishes can be misunderstood, or not followed. Treatments may be given even if you do not want them. An advance directive may make it easier for your family to make difficult choices about your care.   Urinary Incontinence   Urinary incontinence (UI)  is when you lose control of your bladder. UI develops because your bladder cannot store or empty urine properly. The 3 most common types of UI are stress incontinence, urge incontinence, or both.  Medicines:   May be given to help strengthen your bladder control. Report any side effects of medication to your healthcare provider.  Do pelvic muscle exercises often:  Your pelvic muscles help you stop urinating. Squeeze these muscles tight for 5 seconds, then relax for 5 seconds. Gradually work up to squeezing for 10 seconds. Do 3 sets of 15 repetitions a day, or as directed. This will help strengthen your pelvic muscles and improve bladder control.  Train your bladder:  Go to the bathroom at set times, such as every 2 hours, even if you do not feel the urge to go. You can also try to hold your urine when you feel the urge to go. For example, hold your urine for 5 minutes when you feel the urge to go. As that becomes easier, hold your urine for 10 minutes.   Self-care:   Keep a UI record.  Write down how often you leak urine and how much you leak. Make a note of what you were doing when you leaked urine.  Drink liquids as directed. You may need to limit the amount of liquid you drink to help control your urine leakage. Do not drink any liquid right before you go to bed. Limit or do not have drinks that contain caffeine or alcohol.   Prevent constipation.  Eat a variety of  high-fiber foods. Good examples are high-fiber cereals, beans, vegetables, and whole-grain breads. Walking is the best way to trigger your intestines to have a bowel movement.  Exercise regularly and maintain a healthy weight.  Weight loss and exercise will decrease pressure on your bladder and help you control your leakage.   Use a catheter as directed  to help empty your bladder. A catheter is a tiny, plastic tube that is put into your bladder to drain your urine.   Go to behavior therapy as directed.  Behavior therapy may be used to help you learn to control your urge to urinate.    Weight Management   Why it is important to manage your weight:  Being overweight increases your risk of health conditions such as heart disease, high blood pressure, type 2 diabetes, and certain types of cancer. It can also increase your risk for osteoarthritis, sleep apnea, and other respiratory problems. Aim for a slow, steady weight loss. Even a small amount of weight loss can lower your risk of health problems.  How to lose weight safely:  A safe and healthy way to lose weight is to eat fewer calories and get regular exercise. You can lose up about 1 pound a week by decreasing the number of calories you eat by 500 calories each day.   Healthy meal plan for weight management:  A healthy meal plan includes a variety of foods, contains fewer calories, and helps you stay healthy. A healthy meal plan includes the following:  Eat whole-grain foods more often.  A healthy meal plan should contain fiber. Fiber is the part of grains, fruits, and vegetables that is not broken down by your body. Whole-grain foods are healthy and provide extra fiber in your diet. Some examples of whole-grain foods are whole-wheat breads and pastas, oatmeal, brown rice, and bulgur.  Eat a variety of vegetables every day.  Include dark, leafy greens such as spinach, kale, shukri greens, and mustard greens. Eat yellow and orange vegetables such as carrots, sweet  potatoes, and winter squash.   Eat a variety of fruits every day.  Choose fresh or canned fruit (canned in its own juice or light syrup) instead of juice. Fruit juice has very little or no fiber.  Eat low-fat dairy foods.  Drink fat-free (skim) milk or 1% milk. Eat fat-free yogurt and low-fat cottage cheese. Try low-fat cheeses such as mozzarella and other reduced-fat cheeses.  Choose meat and other protein foods that are low in fat.  Choose beans or other legumes such as split peas or lentils. Choose fish, skinless poultry (chicken or turkey), or lean cuts of red meat (beef or pork). Before you cook meat or poultry, cut off any visible fat.   Use less fat and oil.  Try baking foods instead of frying them. Add less fat, such as margarine, sour cream, regular salad dressing and mayonnaise to foods. Eat fewer high-fat foods. Some examples of high-fat foods include french fries, doughnuts, ice cream, and cakes.  Eat fewer sweets.  Limit foods and drinks that are high in sugar. This includes candy, cookies, regular soda, and sweetened drinks.  Exercise:  Exercise at least 30 minutes per day on most days of the week. Some examples of exercise include walking, biking, dancing, and swimming. You can also fit in more physical activity by taking the stairs instead of the elevator or parking farther away from stores. Ask your healthcare provider about the best exercise plan for you.      © Copyright [employer] 2018 Information is for End User's use only and may not be sold, redistributed or otherwise used for commercial purposes. All illustrations and images included in CareNotes® are the copyrighted property of [email or web address] or [employer]      Medicare Preventive Visit Patient Instructions  Thank you for completing your Welcome to Medicare Visit or Medicare Annual Wellness Visit today. Your next wellness visit will be due in one year (6/11/2025).  The screening/preventive services that you may require over the  next 5-10 years are detailed below. Some tests may not apply to you based off risk factors and/or age. Screening tests ordered at today's visit but not completed yet may show as past due. Also, please note that scanned in results may not display below.  Preventive Screenings:  Service Recommendations Previous Testing/Comments   Colorectal Cancer Screening  * Colonoscopy    * Fecal Occult Blood Test (FOBT)/Fecal Immunochemical Test (FIT)  * Fecal DNA/Cologuard Test  * Flexible Sigmoidoscopy Age: 45-75 years old   Colonoscopy: every 10 years (may be performed more frequently if at higher risk)  OR  FOBT/FIT: every 1 year  OR  Cologuard: every 3 years  OR  Sigmoidoscopy: every 5 years  Screening may be recommended earlier than age 45 if at higher risk for colorectal cancer. Also, an individualized decision between you and your healthcare provider will decide whether screening between the ages of 76-85 would be appropriate. Colonoscopy: 01/04/2023  FOBT/FIT: Not on file  Cologuard: 11/14/2022  Sigmoidoscopy: Not on file    Screening Current     Breast Cancer Screening Age: 40+ years old  Frequency: every 1-2 years  Not required if history of left and right mastectomy Mammogram: 07/09/2021        Cervical Cancer Screening Between the ages of 21-29, pap smear recommended once every 3 years.   Between the ages of 30-65, can perform pap smear with HPV co-testing every 5 years.   Recommendations may differ for women with a history of total hysterectomy, cervical cancer, or abnormal pap smears in past. Pap Smear: Not on file    Screening Not Indicated   Hepatitis C Screening Once for adults born between 1945 and 1965  More frequently in patients at high risk for Hepatitis C Hep C Antibody: 10/28/2019    Screening Current   Diabetes Screening 1-2 times per year if you're at risk for diabetes or have pre-diabetes Fasting glucose: 95 mg/dL (1/25/2024)  A1C: No results in last 5 years (No results in last 5 years)  Screening  Current   Cholesterol Screening Once every 5 years if you don't have a lipid disorder. May order more often based on risk factors. Lipid panel: 01/25/2024    Screening Current     Other Preventive Screenings Covered by Medicare:  Abdominal Aortic Aneurysm (AAA) Screening: covered once if your at risk. You're considered to be at risk if you have a family history of AAA.  Lung Cancer Screening: covers low dose CT scan once per year if you meet all of the following conditions: (1) Age 55-77; (2) No signs or symptoms of lung cancer; (3) Current smoker or have quit smoking within the last 15 years; (4) You have a tobacco smoking history of at least 20 pack years (packs per day multiplied by number of years you smoked); (5) You get a written order from a healthcare provider.  Glaucoma Screening: covered annually if you're considered high risk: (1) You have diabetes OR (2) Family history of glaucoma OR (3)  aged 50 and older OR (4)  American aged 65 and older  Osteoporosis Screening: covered every 2 years if you meet one of the following conditions: (1) You're estrogen deficient and at risk for osteoporosis based off medical history and other findings; (2) Have a vertebral abnormality; (3) On glucocorticoid therapy for more than 3 months; (4) Have primary hyperparathyroidism; (5) On osteoporosis medications and need to assess response to drug therapy.   Last bone density test (DXA Scan): 09/17/2020.  HIV Screening: covered annually if you're between the age of 15-65. Also covered annually if you are younger than 15 and older than 65 with risk factors for HIV infection. For pregnant patients, it is covered up to 3 times per pregnancy.    Immunizations:  Immunization Recommendations   Influenza Vaccine Annual influenza vaccination during flu season is recommended for all persons aged >= 6 months who do not have contraindications   Pneumococcal Vaccine   * Pneumococcal conjugate vaccine = PCV13  (Prevnar 13), PCV15 (Vaxneuvance), PCV20 (Prevnar 20)  * Pneumococcal polysaccharide vaccine = PPSV23 (Pneumovax) Adults 19-65 yo with certain risk factors or if 65+ yo  If never received any pneumonia vaccine: recommend Prevnar 20 (PCV20)  Give PCV20 if previously received 1 dose of PCV13 or PPSV23   Hepatitis B Vaccine 3 dose series if at intermediate or high risk (ex: diabetes, end stage renal disease, liver disease)   Respiratory syncytial virus (RSV) Vaccine - COVERED BY MEDICARE PART D  * RSVPreF3 (Arexvy) CDC recommends that adults 60 years of age and older may receive a single dose of RSV vaccine using shared clinical decision-making (SCDM)   Tetanus (Td) Vaccine - COST NOT COVERED BY MEDICARE PART B Following completion of primary series, a booster dose should be given every 10 years to maintain immunity against tetanus. Td may also be given as tetanus wound prophylaxis.   Tdap Vaccine - COST NOT COVERED BY MEDICARE PART B Recommended at least once for all adults. For pregnant patients, recommended with each pregnancy.   Shingles Vaccine (Shingrix) - COST NOT COVERED BY MEDICARE PART B  2 shot series recommended in those 19 years and older who have or will have weakened immune systems or those 50 years and older     Health Maintenance Due:      Topic Date Due    Breast Cancer Screening: Mammogram  07/09/2022    Colorectal Cancer Screening  01/03/2026    Hepatitis C Screening  Completed     Immunizations Due:      Topic Date Due    Hepatitis A Vaccine (1 of 2 - Risk 2-dose series) Never done    COVID-19 Vaccine (4 - 2023-24 season) 09/01/2023     Advance Directives   What are advance directives?  Advance directives are legal documents that state your wishes and plans for medical care. These plans are made ahead of time in case you lose your ability to make decisions for yourself. Advance directives can apply to any medical decision, such as the treatments you want, and if you want to donate organs.   What  are the types of advance directives?  There are many types of advance directives, and each state has rules about how to use them. You may choose a combination of any of the following:  Living will:  This is a written record of the treatment you want. You can also choose which treatments you do not want, which to limit, and which to stop at a certain time. This includes surgery, medicine, IV fluid, and tube feedings.   Durable power of  for healthcare (DPAHC):  This is a written record that states who you want to make healthcare choices for you when you are unable to make them for yourself. This person, called a proxy, is usually a family member or a friend. You may choose more than 1 proxy.  Do not resuscitate (DNR) order:  A DNR order is used in case your heart stops beating or you stop breathing. It is a request not to have certain forms of treatment, such as CPR. A DNR order may be included in other types of advance directives.  Medical directive:  This covers the care that you want if you are in a coma, near death, or unable to make decisions for yourself. You can list the treatments you want for each condition. Treatment may include pain medicine, surgery, blood transfusions, dialysis, IV or tube feedings, and a ventilator (breathing machine).  Values history:  This document has questions about your views, beliefs, and how you feel and think about life. This information can help others choose the care that you would choose.  Why are advance directives important?  An advance directive helps you control your care. Although spoken wishes may be used, it is better to have your wishes written down. Spoken wishes can be misunderstood, or not followed. Treatments may be given even if you do not want them. An advance directive may make it easier for your family to make difficult choices about your care.   Urinary Incontinence   Urinary incontinence (UI)  is when you lose control of your bladder. UI develops  because your bladder cannot store or empty urine properly. The 3 most common types of UI are stress incontinence, urge incontinence, or both.  Medicines:   May be given to help strengthen your bladder control. Report any side effects of medication to your healthcare provider.  Do pelvic muscle exercises often:  Your pelvic muscles help you stop urinating. Squeeze these muscles tight for 5 seconds, then relax for 5 seconds. Gradually work up to squeezing for 10 seconds. Do 3 sets of 15 repetitions a day, or as directed. This will help strengthen your pelvic muscles and improve bladder control.  Train your bladder:  Go to the bathroom at set times, such as every 2 hours, even if you do not feel the urge to go. You can also try to hold your urine when you feel the urge to go. For example, hold your urine for 5 minutes when you feel the urge to go. As that becomes easier, hold your urine for 10 minutes.   Self-care:   Keep a UI record.  Write down how often you leak urine and how much you leak. Make a note of what you were doing when you leaked urine.  Drink liquids as directed. You may need to limit the amount of liquid you drink to help control your urine leakage. Do not drink any liquid right before you go to bed. Limit or do not have drinks that contain caffeine or alcohol.   Prevent constipation.  Eat a variety of high-fiber foods. Good examples are high-fiber cereals, beans, vegetables, and whole-grain breads. Walking is the best way to trigger your intestines to have a bowel movement.  Exercise regularly and maintain a healthy weight.  Weight loss and exercise will decrease pressure on your bladder and help you control your leakage.   Use a catheter as directed  to help empty your bladder. A catheter is a tiny, plastic tube that is put into your bladder to drain your urine.   Go to behavior therapy as directed.  Behavior therapy may be used to help you learn to control your urge to urinate.    Weight Management    Why it is important to manage your weight:  Being overweight increases your risk of health conditions such as heart disease, high blood pressure, type 2 diabetes, and certain types of cancer. It can also increase your risk for osteoarthritis, sleep apnea, and other respiratory problems. Aim for a slow, steady weight loss. Even a small amount of weight loss can lower your risk of health problems.  How to lose weight safely:  A safe and healthy way to lose weight is to eat fewer calories and get regular exercise. You can lose up about 1 pound a week by decreasing the number of calories you eat by 500 calories each day.   Healthy meal plan for weight management:  A healthy meal plan includes a variety of foods, contains fewer calories, and helps you stay healthy. A healthy meal plan includes the following:  Eat whole-grain foods more often.  A healthy meal plan should contain fiber. Fiber is the part of grains, fruits, and vegetables that is not broken down by your body. Whole-grain foods are healthy and provide extra fiber in your diet. Some examples of whole-grain foods are whole-wheat breads and pastas, oatmeal, brown rice, and bulgur.  Eat a variety of vegetables every day.  Include dark, leafy greens such as spinach, kale, shukri greens, and mustard greens. Eat yellow and orange vegetables such as carrots, sweet potatoes, and winter squash.   Eat a variety of fruits every day.  Choose fresh or canned fruit (canned in its own juice or light syrup) instead of juice. Fruit juice has very little or no fiber.  Eat low-fat dairy foods.  Drink fat-free (skim) milk or 1% milk. Eat fat-free yogurt and low-fat cottage cheese. Try low-fat cheeses such as mozzarella and other reduced-fat cheeses.  Choose meat and other protein foods that are low in fat.  Choose beans or other legumes such as split peas or lentils. Choose fish, skinless poultry (chicken or turkey), or lean cuts of red meat (beef or pork). Before you cook  meat or poultry, cut off any visible fat.   Use less fat and oil.  Try baking foods instead of frying them. Add less fat, such as margarine, sour cream, regular salad dressing and mayonnaise to foods. Eat fewer high-fat foods. Some examples of high-fat foods include french fries, doughnuts, ice cream, and cakes.  Eat fewer sweets.  Limit foods and drinks that are high in sugar. This includes candy, cookies, regular soda, and sweetened drinks.  Exercise:  Exercise at least 30 minutes per day on most days of the week. Some examples of exercise include walking, biking, dancing, and swimming. You can also fit in more physical activity by taking the stairs instead of the elevator or parking farther away from stores. Ask your healthcare provider about the best exercise plan for you.      © Copyright [employer] 2018 Information is for End User's use only and may not be sold, redistributed or otherwise used for commercial purposes. All illustrations and images included in CareNotes® are the copyrighted property of [employer] or [employer]      Medicare Preventive Visit Patient Instructions  Thank you for completing your Welcome to Medicare Visit or Medicare Annual Wellness Visit today. Your next wellness visit will be due in one year (6/11/2025).  The screening/preventive services that you may require over the next 5-10 years are detailed below. Some tests may not apply to you based off risk factors and/or age. Screening tests ordered at today's visit but not completed yet may show as past due. Also, please note that scanned in results may not display below.  Preventive Screenings:  Service Recommendations Previous Testing/Comments   Colorectal Cancer Screening  * Colonoscopy    * Fecal Occult Blood Test (FOBT)/Fecal Immunochemical Test (FIT)  * Fecal DNA/Cologuard Test  * Flexible Sigmoidoscopy Age: 45-75 years old   Colonoscopy: every 10 years (may be performed more frequently if at higher risk)   OR  FOBT/FIT: every 1 year  OR  Cologuard: every 3 years  OR  Sigmoidoscopy: every 5 years  Screening may be recommended earlier than age 45 if at higher risk for colorectal cancer. Also, an individualized decision between you and your healthcare provider will decide whether screening between the ages of 76-85 would be appropriate. Colonoscopy: 01/04/2023  FOBT/FIT: Not on file  Cologuard: 11/14/2022  Sigmoidoscopy: Not on file    Screening Current     Breast Cancer Screening Age: 40+ years old  Frequency: every 1-2 years  Not required if history of left and right mastectomy Mammogram: 07/09/2021        Cervical Cancer Screening Between the ages of 21-29, pap smear recommended once every 3 years.   Between the ages of 30-65, can perform pap smear with HPV co-testing every 5 years.   Recommendations may differ for women with a history of total hysterectomy, cervical cancer, or abnormal pap smears in past. Pap Smear: Not on file    Screening Not Indicated   Hepatitis C Screening Once for adults born between 1945 and 1965  More frequently in patients at high risk for Hepatitis C Hep C Antibody: 10/28/2019    Screening Current   Diabetes Screening 1-2 times per year if you're at risk for diabetes or have pre-diabetes Fasting glucose: 95 mg/dL (1/25/2024)  A1C: No results in last 5 years (No results in last 5 years)  Screening Current   Cholesterol Screening Once every 5 years if you don't have a lipid disorder. May order more often based on risk factors. Lipid panel: 01/25/2024    Screening Current     Other Preventive Screenings Covered by Medicare:  Abdominal Aortic Aneurysm (AAA) Screening: covered once if your at risk. You're considered to be at risk if you have a family history of AAA.  Lung Cancer Screening: covers low dose CT scan once per year if you meet all of the following conditions: (1) Age 55-77; (2) No signs or symptoms of lung cancer; (3) Current smoker or have quit smoking within the last 15 years; (4)  You have a tobacco smoking history of at least 20 pack years (packs per day multiplied by number of years you smoked); (5) You get a written order from a healthcare provider.  Glaucoma Screening: covered annually if you're considered high risk: (1) You have diabetes OR (2) Family history of glaucoma OR (3)  aged 50 and older OR (4)  American aged 65 and older  Osteoporosis Screening: covered every 2 years if you meet one of the following conditions: (1) You're estrogen deficient and at risk for osteoporosis based off medical history and other findings; (2) Have a vertebral abnormality; (3) On glucocorticoid therapy for more than 3 months; (4) Have primary hyperparathyroidism; (5) On osteoporosis medications and need to assess response to drug therapy.   Last bone density test (DXA Scan): 09/17/2020.  HIV Screening: covered annually if you're between the age of 15-65. Also covered annually if you are younger than 15 and older than 65 with risk factors for HIV infection. For pregnant patients, it is covered up to 3 times per pregnancy.    Immunizations:  Immunization Recommendations   Influenza Vaccine Annual influenza vaccination during flu season is recommended for all persons aged >= 6 months who do not have contraindications   Pneumococcal Vaccine   * Pneumococcal conjugate vaccine = PCV13 (Prevnar 13), PCV15 (Vaxneuvance), PCV20 (Prevnar 20)  * Pneumococcal polysaccharide vaccine = PPSV23 (Pneumovax) Adults 19-65 yo with certain risk factors or if 65+ yo  If never received any pneumonia vaccine: recommend Prevnar 20 (PCV20)  Give PCV20 if previously received 1 dose of PCV13 or PPSV23   Hepatitis B Vaccine 3 dose series if at intermediate or high risk (ex: diabetes, end stage renal disease, liver disease)   Respiratory syncytial virus (RSV) Vaccine - COVERED BY MEDICARE PART D  * RSVPreF3 (Arexvy) CDC recommends that adults 60 years of age and older may receive a single dose of RSV vaccine  using shared clinical decision-making (SCDM)   Tetanus (Td) Vaccine - COST NOT COVERED BY MEDICARE PART B Following completion of primary series, a booster dose should be given every 10 years to maintain immunity against tetanus. Td may also be given as tetanus wound prophylaxis.   Tdap Vaccine - COST NOT COVERED BY MEDICARE PART B Recommended at least once for all adults. For pregnant patients, recommended with each pregnancy.   Shingles Vaccine (Shingrix) - COST NOT COVERED BY MEDICARE PART B  2 shot series recommended in those 19 years and older who have or will have weakened immune systems or those 50 years and older     Health Maintenance Due:      Topic Date Due    Breast Cancer Screening: Mammogram  07/09/2022    Colorectal Cancer Screening  01/03/2026    Hepatitis C Screening  Completed     Immunizations Due:      Topic Date Due    Hepatitis A Vaccine (1 of 2 - Risk 2-dose series) Never done    COVID-19 Vaccine (4 - 2023-24 season) 09/01/2023     Advance Directives   What are advance directives?  Advance directives are legal documents that state your wishes and plans for medical care. These plans are made ahead of time in case you lose your ability to make decisions for yourself. Advance directives can apply to any medical decision, such as the treatments you want, and if you want to donate organs.   What are the types of advance directives?  There are many types of advance directives, and each state has rules about how to use them. You may choose a combination of any of the following:  Living will:  This is a written record of the treatment you want. You can also choose which treatments you do not want, which to limit, and which to stop at a certain time. This includes surgery, medicine, IV fluid, and tube feedings.   Durable power of  for healthcare (DPAHC):  This is a written record that states who you want to make healthcare choices for you when you are unable to make them for yourself. This  person, called a proxy, is usually a family member or a friend. You may choose more than 1 proxy.  Do not resuscitate (DNR) order:  A DNR order is used in case your heart stops beating or you stop breathing. It is a request not to have certain forms of treatment, such as CPR. A DNR order may be included in other types of advance directives.  Medical directive:  This covers the care that you want if you are in a coma, near death, or unable to make decisions for yourself. You can list the treatments you want for each condition. Treatment may include pain medicine, surgery, blood transfusions, dialysis, IV or tube feedings, and a ventilator (breathing machine).  Values history:  This document has questions about your views, beliefs, and how you feel and think about life. This information can help others choose the care that you would choose.  Why are advance directives important?  An advance directive helps you control your care. Although spoken wishes may be used, it is better to have your wishes written down. Spoken wishes can be misunderstood, or not followed. Treatments may be given even if you do not want them. An advance directive may make it easier for your family to make difficult choices about your care.   Urinary Incontinence   Urinary incontinence (UI)  is when you lose control of your bladder. UI develops because your bladder cannot store or empty urine properly. The 3 most common types of UI are stress incontinence, urge incontinence, or both.  Medicines:   May be given to help strengthen your bladder control. Report any side effects of medication to your healthcare provider.  Do pelvic muscle exercises often:  Your pelvic muscles help you stop urinating. Squeeze these muscles tight for 5 seconds, then relax for 5 seconds. Gradually work up to squeezing for 10 seconds. Do 3 sets of 15 repetitions a day, or as directed. This will help strengthen your pelvic muscles and improve bladder control.  Train your  bladder:  Go to the bathroom at set times, such as every 2 hours, even if you do not feel the urge to go. You can also try to hold your urine when you feel the urge to go. For example, hold your urine for 5 minutes when you feel the urge to go. As that becomes easier, hold your urine for 10 minutes.   Self-care:   Keep a UI record.  Write down how often you leak urine and how much you leak. Make a note of what you were doing when you leaked urine.  Drink liquids as directed. You may need to limit the amount of liquid you drink to help control your urine leakage. Do not drink any liquid right before you go to bed. Limit or do not have drinks that contain caffeine or alcohol.   Prevent constipation.  Eat a variety of high-fiber foods. Good examples are high-fiber cereals, beans, vegetables, and whole-grain breads. Walking is the best way to trigger your intestines to have a bowel movement.  Exercise regularly and maintain a healthy weight.  Weight loss and exercise will decrease pressure on your bladder and help you control your leakage.   Use a catheter as directed  to help empty your bladder. A catheter is a tiny, plastic tube that is put into your bladder to drain your urine.   Go to behavior therapy as directed.  Behavior therapy may be used to help you learn to control your urge to urinate.    Weight Management   Why it is important to manage your weight:  Being overweight increases your risk of health conditions such as heart disease, high blood pressure, type 2 diabetes, and certain types of cancer. It can also increase your risk for osteoarthritis, sleep apnea, and other respiratory problems. Aim for a slow, steady weight loss. Even a small amount of weight loss can lower your risk of health problems.  How to lose weight safely:  A safe and healthy way to lose weight is to eat fewer calories and get regular exercise. You can lose up about 1 pound a week by decreasing the number of calories you eat by 500  calories each day.   Healthy meal plan for weight management:  A healthy meal plan includes a variety of foods, contains fewer calories, and helps you stay healthy. A healthy meal plan includes the following:  Eat whole-grain foods more often.  A healthy meal plan should contain fiber. Fiber is the part of grains, fruits, and vegetables that is not broken down by your body. Whole-grain foods are healthy and provide extra fiber in your diet. Some examples of whole-grain foods are whole-wheat breads and pastas, oatmeal, brown rice, and bulgur.  Eat a variety of vegetables every day.  Include dark, leafy greens such as spinach, kale, shukri greens, and mustard greens. Eat yellow and orange vegetables such as carrots, sweet potatoes, and winter squash.   Eat a variety of fruits every day.  Choose fresh or canned fruit (canned in its own juice or light syrup) instead of juice. Fruit juice has very little or no fiber.  Eat low-fat dairy foods.  Drink fat-free (skim) milk or 1% milk. Eat fat-free yogurt and low-fat cottage cheese. Try low-fat cheeses such as mozzarella and other reduced-fat cheeses.  Choose meat and other protein foods that are low in fat.  Choose beans or other legumes such as split peas or lentils. Choose fish, skinless poultry (chicken or turkey), or lean cuts of red meat (beef or pork). Before you cook meat or poultry, cut off any visible fat.   Use less fat and oil.  Try baking foods instead of frying them. Add less fat, such as margarine, sour cream, regular salad dressing and mayonnaise to foods. Eat fewer high-fat foods. Some examples of high-fat foods include french fries, doughnuts, ice cream, and cakes.  Eat fewer sweets.  Limit foods and drinks that are high in sugar. This includes candy, cookies, regular soda, and sweetened drinks.  Exercise:  Exercise at least 30 minutes per day on most days of the week. Some examples of exercise include walking, biking, dancing, and swimming. You can also  fit in more physical activity by taking the stairs instead of the elevator or parking farther away from stores. Ask your healthcare provider about the best exercise plan for you.      © Copyright [employer] 2018 Information is for End User's use only and may not be sold, redistributed or otherwise used for commercial purposes. All illustrations and images included in CareNotes® are the copyrighted property of [email or web address] or [email or web address]

## 2024-06-10 NOTE — PROGRESS NOTES
Chief Complaint   Patient presents with    Medicare Wellness Visit    Follow-up        HPI   Here for follow-up of hypertension, hyponatremia, temporal arteritis, and depression. And knee DJD.  And Medicare wellness exam.  Doing well.  Getting  in August in [place].    chemotherapy for his B-cell lymphoma recurrence.  Completed last round of chemotherapy for recurrent B-cell lymphoma.  Has been using amlodipine 1 tablet alternating every other day with 1/2 tablet which has kept the ankle swelling under control.  Not needing Symbicort on a regular basis.  Was feeling depressed for couple months.  Somewhat of a funk.  Feels that she is getting better.    Past Medical History:   Diagnosis Date    Age-related osteoporosis without current pathological fracture 02/14/2022    Anxiety state 12/19/2014    Benign essential hypertension 01/14/2010    Chronic rhinitis 01/12/2012    Depression 2019    Diverticulitis 01/07/2023    Uncomplicated-treated with Augmentin    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 hair loss 02/04/2020    Fracture of multiple ribs of left side 12/21/2020    Hyponatremia 10/02/2020    Mild persistent asthma without complication 09/07/2017    Personal history of colonic polyps 01/04/2023    Multiple polyps removed 1/4/2023.  Recall 3 years.    Right chronic serous otitis media 11/07/2017    Resolved after myringotomy tube    Scapula fracture 12/21/2020    Temporal arteritis (HCC) 09/04/2020    Biopsy negative-treated with prednisone for 1 year        Past Surgical History:   Procedure Laterality Date    COLONOSCOPY      LAPAROSCOPY      X2 for endometriosis    NV LIGATION/BIOPSY TEMPORAL ARTERY Left 08/14/2020    Procedure: TEMPORAL ARTERY BIOPSY;  Surgeon: Yosvany Hardy MD;  Location:  MAIN OR;  Service: General    TOTAL KNEE ARTHROPLASTY Right 03/28/2022    Dr. Lake       Social History     Tobacco Use    Smoking status: Never    Smokeless tobacco: Never   Substance Use Topics    Alcohol use: Yes      "Comment: socially       Social History     Social History Narrative     since 1981.      Two sons.      Retired in 2018.  Was a .     is a retired teacher.    Enjoys reading, gardening, walking, needle work, painting, writing books.    9/23-[facility] living in [place].  Jose Juan engaged living in [place].Getting   August 10, 2024.        The following portions of the patient's history were reviewed and updated as appropriate: allergies, current medications, past family history, past medical history, past social history, past surgical history, and problem list.      Review of Systems       /74 (BP Location: Left arm, Patient Position: Sitting, Cuff Size: Large)   Pulse 64   Temp (!) 96.4 °F (35.8 °C) (Temporal)   Ht 5' 3\" (1.6 m)   Wt 83.4 kg (183 lb 12.8 oz)   SpO2 96%   BMI 32.56 kg/m²      Physical Exam   Appears well.  Lungs are clear.  Heart regular with no murmur.  No edema.  Mood is upbeat.  Affect appropriate.              Current Outpatient Medications:     albuterol (2.5 mg/3 mL) 0.083 % nebulizer solution, Take 3 mL (2.5 mg total) by nebulization every 6 (six) hours as needed for wheezing or shortness of breath, Disp: 180 mL, Rfl: 0    albuterol (ProAir HFA) 90 mcg/act inhaler, Inhale 2 puffs every 6 (six) hours as needed for wheezing, Disp: 8.5 g, Rfl: 5    alendronate (FOSAMAX) 70 mg tablet, TAKE 1 TAB WEEKLY ON EMPTY STOMACH IN THE UPRIGHT POSITION, Disp: 12 tablet, Rfl: 4    Aluminum & Magnesium Hydroxide (MAGNESIUM-ALUMINUM PO), Take by mouth, Disp: , Rfl:     amLODIPine (NORVASC) 5 mg tablet, 1 tablet alternating every other day with 1/2 tablet, Disp: , Rfl:     budesonide-formoterol (Symbicort) 80-4.5 MCG/ACT inhaler, Inhale 2 puffs 2 (two) times a day Rinse mouth after use., Disp: 10.2 g, Rfl: 5    loratadine (CLARITIN) 10 mg tablet, Take 1 tablet (10 mg total) by mouth daily, Disp: 30 tablet, Rfl: 0    metoprolol succinate (TOPROL-XL) 100 mg 24 hr " tablet, Take 1 tablet (100 mg total) by mouth daily, Disp: 90 tablet, Rfl: 3    olmesartan (BENICAR) 20 mg tablet, TAKE 1 TABLET BY MOUTH EVERY DAY, Disp: 90 tablet, Rfl: 3    Omega-3 1000 MG CAPS, Take by mouth, Disp: , Rfl:     sertraline (ZOLOFT) 50 mg tablet, Take 1.5 tablets (75 mg total) by mouth daily, Disp: 135 tablet, Rfl: 3    spironolactone (ALDACTONE) 50 mg tablet, TAKE 1 TABLET BY MOUTH EVERY DAY, Disp: 90 tablet, Rfl: 1     No problem-specific Assessment & Plan notes found for this encounter.       Diagnoses and all orders for this visit:    Medicare annual wellness visit, subsequent    Benign essential hypertension  -     olmesartan (BENICAR) 20 mg tablet; TAKE 1 TABLET BY MOUTH EVERY DAY  -     amLODIPine (NORVASC) 5 mg tablet; 1 tablet alternating every other day with 1/2 tablet    Mild intermittent asthma without complication    Major depressive disorder with single episode, in full remission (HCC)    Age-related osteoporosis without current pathological fracture    Encounter for screening mammogram for breast cancer  -     Mammo screening bilateral w 3d & cad; Future    Moderate episode of recurrent major depressive disorder (HCC)  -     sertraline (ZOLOFT) 50 mg tablet; Take 1.5 tablets (75 mg total) by mouth daily        Patient Instructions   Medicare wellness exam was completed.  Blood pressure is nicely controlled.  Last sodium was okay.  Lipids are excellent.  Medications are reviewed and stay the same.  Another prescription given for mammogram.  Increase sertraline to 75 mg daily.  After 1 month if not feeling wonderful, increase to 100 mg.  Recheck in 6 months.    Medicare Preventive Visit Patient Instructions  Thank you for completing your Welcome to Medicare Visit or Medicare Annual Wellness Visit today. Your next wellness visit will be due in one year (6/11/2025).  The screening/preventive services that you may require over the next 5-10 years are detailed below. Some tests may not  apply to you based off risk factors and/or age. Screening tests ordered at today's visit but not completed yet may show as past due. Also, please note that scanned in results may not display below.  Preventive Screenings:  Service Recommendations Previous Testing/Comments   Colorectal Cancer Screening  * Colonoscopy    * Fecal Occult Blood Test (FOBT)/Fecal Immunochemical Test (FIT)  * Fecal DNA/Cologuard Test  * Flexible Sigmoidoscopy Age: 45-75 years old   Colonoscopy: every 10 years (may be performed more frequently if at higher risk)  OR  FOBT/FIT: every 1 year  OR  Cologuard: every 3 years  OR  Sigmoidoscopy: every 5 years  Screening may be recommended earlier than age 45 if at higher risk for colorectal cancer. Also, an individualized decision between you and your healthcare provider will decide whether screening between the ages of 76-85 would be appropriate. Colonoscopy: 01/04/2023  FOBT/FIT: Not on file  Cologuard: 11/14/2022  Sigmoidoscopy: Not on file    Screening Current     Breast Cancer Screening Age: 40+ years old  Frequency: every 1-2 years  Not required if history of left and right mastectomy Mammogram: 07/09/2021        Cervical Cancer Screening Between the ages of 21-29, pap smear recommended once every 3 years.   Between the ages of 30-65, can perform pap smear with HPV co-testing every 5 years.   Recommendations may differ for women with a history of total hysterectomy, cervical cancer, or abnormal pap smears in past. Pap Smear: Not on file    Screening Not Indicated   Hepatitis C Screening Once for adults born between 1945 and 1965  More frequently in patients at high risk for Hepatitis C Hep C Antibody: 10/28/2019    Screening Current   Diabetes Screening 1-2 times per year if you're at risk for diabetes or have pre-diabetes Fasting glucose: 95 mg/dL (1/25/2024)  A1C: No results in last 5 years (No results in last 5 years)  Screening Current   Cholesterol Screening Once every 5 years if you  don't have a lipid disorder. May order more often based on risk factors. Lipid panel: 01/25/2024    Screening Current     Other Preventive Screenings Covered by Medicare:  Abdominal Aortic Aneurysm (AAA) Screening: covered once if your at risk. You're considered to be at risk if you have a family history of AAA.  Lung Cancer Screening: covers low dose CT scan once per year if you meet all of the following conditions: (1) Age 55-77; (2) No signs or symptoms of lung cancer; (3) Current smoker or have quit smoking within the last 15 years; (4) You have a tobacco smoking history of at least 20 pack years (packs per day multiplied by number of years you smoked); (5) You get a written order from a healthcare provider.  Glaucoma Screening: covered annually if you're considered high risk: (1) You have diabetes OR (2) Family history of glaucoma OR (3)  aged 50 and older OR (4)  American aged 65 and older  Osteoporosis Screening: covered every 2 years if you meet one of the following conditions: (1) You're estrogen deficient and at risk for osteoporosis based off medical history and other findings; (2) Have a vertebral abnormality; (3) On glucocorticoid therapy for more than 3 months; (4) Have primary hyperparathyroidism; (5) On osteoporosis medications and need to assess response to drug therapy.   Last bone density test (DXA Scan): 09/17/2020.  HIV Screening: covered annually if you're between the age of 15-65. Also covered annually if you are younger than 15 and older than 65 with risk factors for HIV infection. For pregnant patients, it is covered up to 3 times per pregnancy.    Immunizations:  Immunization Recommendations   Influenza Vaccine Annual influenza vaccination during flu season is recommended for all persons aged >= 6 months who do not have contraindications   Pneumococcal Vaccine   * Pneumococcal conjugate vaccine = PCV13 (Prevnar 13), PCV15 (Vaxneuvance), PCV20 (Prevnar 20)  *  Pneumococcal polysaccharide vaccine = PPSV23 (Pneumovax) Adults 19-65 yo with certain risk factors or if 65+ yo  If never received any pneumonia vaccine: recommend Prevnar 20 (PCV20)  Give PCV20 if previously received 1 dose of PCV13 or PPSV23   Hepatitis B Vaccine 3 dose series if at intermediate or high risk (ex: diabetes, end stage renal disease, liver disease)   Respiratory syncytial virus (RSV) Vaccine - COVERED BY MEDICARE PART D  * RSVPreF3 (Arexvy) CDC recommends that adults 60 years of age and older may receive a single dose of RSV vaccine using shared clinical decision-making (SCDM)   Tetanus (Td) Vaccine - COST NOT COVERED BY MEDICARE PART B Following completion of primary series, a booster dose should be given every 10 years to maintain immunity against tetanus. Td may also be given as tetanus wound prophylaxis.   Tdap Vaccine - COST NOT COVERED BY MEDICARE PART B Recommended at least once for all adults. For pregnant patients, recommended with each pregnancy.   Shingles Vaccine (Shingrix) - COST NOT COVERED BY MEDICARE PART B  2 shot series recommended in those 19 years and older who have or will have weakened immune systems or those 50 years and older     Health Maintenance Due:      Topic Date Due    Breast Cancer Screening: Mammogram  07/09/2022    Colorectal Cancer Screening  01/03/2026    Hepatitis C Screening  Completed     Immunizations Due:      Topic Date Due    Hepatitis A Vaccine (1 of 2 - Risk 2-dose series) Never done    COVID-19 Vaccine (4 - 2023-24 season) 09/01/2023     Advance Directives   What are advance directives?  Advance directives are legal documents that state your wishes and plans for medical care. These plans are made ahead of time in case you lose your ability to make decisions for yourself. Advance directives can apply to any medical decision, such as the treatments you want, and if you want to donate organs.   What are the types of advance directives?  There are many types  of advance directives, and each state has rules about how to use them. You may choose a combination of any of the following:  Living will:  This is a written record of the treatment you want. You can also choose which treatments you do not want, which to limit, and which to stop at a certain time. This includes surgery, medicine, IV fluid, and tube feedings.   Durable power of  for healthcare (DPAHC):  This is a written record that states who you want to make healthcare choices for you when you are unable to make them for yourself. This person, called a proxy, is usually a family member or a friend. You may choose more than 1 proxy.  Do not resuscitate (DNR) order:  A DNR order is used in case your heart stops beating or you stop breathing. It is a request not to have certain forms of treatment, such as CPR. A DNR order may be included in other types of advance directives.  Medical directive:  This covers the care that you want if you are in a coma, near death, or unable to make decisions for yourself. You can list the treatments you want for each condition. Treatment may include pain medicine, surgery, blood transfusions, dialysis, IV or tube feedings, and a ventilator (breathing machine).  Values history:  This document has questions about your views, beliefs, and how you feel and think about life. This information can help others choose the care that you would choose.  Why are advance directives important?  An advance directive helps you control your care. Although spoken wishes may be used, it is better to have your wishes written down. Spoken wishes can be misunderstood, or not followed. Treatments may be given even if you do not want them. An advance directive may make it easier for your family to make difficult choices about your care.   Urinary Incontinence   Urinary incontinence (UI)  is when you lose control of your bladder. UI develops because your bladder cannot store or empty urine properly. The  3 most common types of UI are stress incontinence, urge incontinence, or both.  Medicines:   May be given to help strengthen your bladder control. Report any side effects of medication to your healthcare provider.  Do pelvic muscle exercises often:  Your pelvic muscles help you stop urinating. Squeeze these muscles tight for 5 seconds, then relax for 5 seconds. Gradually work up to squeezing for 10 seconds. Do 3 sets of 15 repetitions a day, or as directed. This will help strengthen your pelvic muscles and improve bladder control.  Train your bladder:  Go to the bathroom at set times, such as every 2 hours, even if you do not feel the urge to go. You can also try to hold your urine when you feel the urge to go. For example, hold your urine for 5 minutes when you feel the urge to go. As that becomes easier, hold your urine for 10 minutes.   Self-care:   Keep a UI record.  Write down how often you leak urine and how much you leak. Make a note of what you were doing when you leaked urine.  Drink liquids as directed. You may need to limit the amount of liquid you drink to help control your urine leakage. Do not drink any liquid right before you go to bed. Limit or do not have drinks that contain caffeine or alcohol.   Prevent constipation.  Eat a variety of high-fiber foods. Good examples are high-fiber cereals, beans, vegetables, and whole-grain breads. Walking is the best way to trigger your intestines to have a bowel movement.  Exercise regularly and maintain a healthy weight.  Weight loss and exercise will decrease pressure on your bladder and help you control your leakage.   Use a catheter as directed  to help empty your bladder. A catheter is a tiny, plastic tube that is put into your bladder to drain your urine.   Go to behavior therapy as directed.  Behavior therapy may be used to help you learn to control your urge to urinate.    Weight Management   Why it is important to manage your weight:  Being overweight  increases your risk of health conditions such as heart disease, high blood pressure, type 2 diabetes, and certain types of cancer. It can also increase your risk for osteoarthritis, sleep apnea, and other respiratory problems. Aim for a slow, steady weight loss. Even a small amount of weight loss can lower your risk of health problems.  How to lose weight safely:  A safe and healthy way to lose weight is to eat fewer calories and get regular exercise. You can lose up about 1 pound a week by decreasing the number of calories you eat by 500 calories each day.   Healthy meal plan for weight management:  A healthy meal plan includes a variety of foods, contains fewer calories, and helps you stay healthy. A healthy meal plan includes the following:  Eat whole-grain foods more often.  A healthy meal plan should contain fiber. Fiber is the part of grains, fruits, and vegetables that is not broken down by your body. Whole-grain foods are healthy and provide extra fiber in your diet. Some examples of whole-grain foods are whole-wheat breads and pastas, oatmeal, brown rice, and bulgur.  Eat a variety of vegetables every day.  Include dark, leafy greens such as spinach, kale, shukri greens, and mustard greens. Eat yellow and orange vegetables such as carrots, sweet potatoes, and winter squash.   Eat a variety of fruits every day.  Choose fresh or canned fruit (canned in its own juice or light syrup) instead of juice. Fruit juice has very little or no fiber.  Eat low-fat dairy foods.  Drink fat-free (skim) milk or 1% milk. Eat fat-free yogurt and low-fat cottage cheese. Try low-fat cheeses such as mozzarella and other reduced-fat cheeses.  Choose meat and other protein foods that are low in fat.  Choose beans or other legumes such as split peas or lentils. Choose fish, skinless poultry (chicken or turkey), or lean cuts of red meat (beef or pork). Before you cook meat or poultry, cut off any visible fat.   Use less fat and oil.   Try baking foods instead of frying them. Add less fat, such as margarine, sour cream, regular salad dressing and mayonnaise to foods. Eat fewer high-fat foods. Some examples of high-fat foods include french fries, doughnuts, ice cream, and cakes.  Eat fewer sweets.  Limit foods and drinks that are high in sugar. This includes candy, cookies, regular soda, and sweetened drinks.  Exercise:  Exercise at least 30 minutes per day on most days of the week. Some examples of exercise include walking, biking, dancing, and swimming. You can also fit in more physical activity by taking the stairs instead of the elevator or parking farther away from stores. Ask your healthcare provider about the best exercise plan for you.      © Copyright [employer] 2018 Information is for End User's use only and may not be sold, redistributed or otherwise used for commercial purposes. All illustrations and images included in CareNotes® are the copyrighted property of "1,2,3 [employer] or [employer]      Medicare Preventive Visit Patient Instructions  Thank you for completing your Welcome to Medicare Visit or Medicare Annual Wellness Visit today. Your next wellness visit will be due in one year (6/11/2025).  The screening/preventive services that you may require over the next 5-10 years are detailed below. Some tests may not apply to you based off risk factors and/or age. Screening tests ordered at today's visit but not completed yet may show as past due. Also, please note that scanned in results may not display below.  Preventive Screenings:  Service Recommendations Previous Testing/Comments   Colorectal Cancer Screening  * Colonoscopy    * Fecal Occult Blood Test (FOBT)/Fecal Immunochemical Test (FIT)  * Fecal DNA/Cologuard Test  * Flexible Sigmoidoscopy Age: 45-75 years old   Colonoscopy: every 10 years (may be performed more frequently if at higher risk)  OR  FOBT/FIT: every 1 year  OR  Cologuard: every 3 years   OR  Sigmoidoscopy: every 5 years  Screening may be recommended earlier than age 45 if at higher risk for colorectal cancer. Also, an individualized decision between you and your healthcare provider will decide whether screening between the ages of 76-85 would be appropriate. Colonoscopy: 01/04/2023  FOBT/FIT: Not on file  Cologuard: 11/14/2022  Sigmoidoscopy: Not on file    Screening Current     Breast Cancer Screening Age: 40+ years old  Frequency: every 1-2 years  Not required if history of left and right mastectomy Mammogram: 07/09/2021        Cervical Cancer Screening Between the ages of 21-29, pap smear recommended once every 3 years.   Between the ages of 30-65, can perform pap smear with HPV co-testing every 5 years.   Recommendations may differ for women with a history of total hysterectomy, cervical cancer, or abnormal pap smears in past. Pap Smear: Not on file    Screening Not Indicated   Hepatitis C Screening Once for adults born between 1945 and 1965  More frequently in patients at high risk for Hepatitis C Hep C Antibody: 10/28/2019    Screening Current   Diabetes Screening 1-2 times per year if you're at risk for diabetes or have pre-diabetes Fasting glucose: 95 mg/dL (1/25/2024)  A1C: No results in last 5 years (No results in last 5 years)  Screening Current   Cholesterol Screening Once every 5 years if you don't have a lipid disorder. May order more often based on risk factors. Lipid panel: 01/25/2024    Screening Current     Other Preventive Screenings Covered by Medicare:  Abdominal Aortic Aneurysm (AAA) Screening: covered once if your at risk. You're considered to be at risk if you have a family history of AAA.  Lung Cancer Screening: covers low dose CT scan once per year if you meet all of the following conditions: (1) Age 55-77; (2) No signs or symptoms of lung cancer; (3) Current smoker or have quit smoking within the last 15 years; (4) You have a tobacco smoking history of at least 20 pack  years (packs per day multiplied by number of years you smoked); (5) You get a written order from a healthcare provider.  Glaucoma Screening: covered annually if you're considered high risk: (1) You have diabetes OR (2) Family history of glaucoma OR (3)  aged 50 and older OR (4)  American aged 65 and older  Osteoporosis Screening: covered every 2 years if you meet one of the following conditions: (1) You're estrogen deficient and at risk for osteoporosis based off medical history and other findings; (2) Have a vertebral abnormality; (3) On glucocorticoid therapy for more than 3 months; (4) Have primary hyperparathyroidism; (5) On osteoporosis medications and need to assess response to drug therapy.   Last bone density test (DXA Scan): 09/17/2020.  HIV Screening: covered annually if you're between the age of 15-65. Also covered annually if you are younger than 15 and older than 65 with risk factors for HIV infection. For pregnant patients, it is covered up to 3 times per pregnancy.    Immunizations:  Immunization Recommendations   Influenza Vaccine Annual influenza vaccination during flu season is recommended for all persons aged >= 6 months who do not have contraindications   Pneumococcal Vaccine   * Pneumococcal conjugate vaccine = PCV13 (Prevnar 13), PCV15 (Vaxneuvance), PCV20 (Prevnar 20)  * Pneumococcal polysaccharide vaccine = PPSV23 (Pneumovax) Adults 19-63 yo with certain risk factors or if 65+ yo  If never received any pneumonia vaccine: recommend Prevnar 20 (PCV20)  Give PCV20 if previously received 1 dose of PCV13 or PPSV23   Hepatitis B Vaccine 3 dose series if at intermediate or high risk (ex: diabetes, end stage renal disease, liver disease)   Respiratory syncytial virus (RSV) Vaccine - COVERED BY MEDICARE PART D  * RSVPreF3 (Arexvy) CDC recommends that adults 60 years of age and older may receive a single dose of RSV vaccine using shared clinical decision-making (SCDM)   Tetanus  (Td) Vaccine - COST NOT COVERED BY MEDICARE PART B Following completion of primary series, a booster dose should be given every 10 years to maintain immunity against tetanus. Td may also be given as tetanus wound prophylaxis.   Tdap Vaccine - COST NOT COVERED BY MEDICARE PART B Recommended at least once for all adults. For pregnant patients, recommended with each pregnancy.   Shingles Vaccine (Shingrix) - COST NOT COVERED BY MEDICARE PART B  2 shot series recommended in those 19 years and older who have or will have weakened immune systems or those 50 years and older     Health Maintenance Due:      Topic Date Due    Breast Cancer Screening: Mammogram  07/09/2022    Colorectal Cancer Screening  01/03/2026    Hepatitis C Screening  Completed     Immunizations Due:      Topic Date Due    Hepatitis A Vaccine (1 of 2 - Risk 2-dose series) Never done    COVID-19 Vaccine (4 - 2023-24 season) 09/01/2023     Advance Directives   What are advance directives?  Advance directives are legal documents that state your wishes and plans for medical care. These plans are made ahead of time in case you lose your ability to make decisions for yourself. Advance directives can apply to any medical decision, such as the treatments you want, and if you want to donate organs.   What are the types of advance directives?  There are many types of advance directives, and each state has rules about how to use them. You may choose a combination of any of the following:  Living will:  This is a written record of the treatment you want. You can also choose which treatments you do not want, which to limit, and which to stop at a certain time. This includes surgery, medicine, IV fluid, and tube feedings.   Durable power of  for healthcare (DPAHC):  This is a written record that states who you want to make healthcare choices for you when you are unable to make them for yourself. This person, called a proxy, is usually a family member or a  friend. You may choose more than 1 proxy.  Do not resuscitate (DNR) order:  A DNR order is used in case your heart stops beating or you stop breathing. It is a request not to have certain forms of treatment, such as CPR. A DNR order may be included in other types of advance directives.  Medical directive:  This covers the care that you want if you are in a coma, near death, or unable to make decisions for yourself. You can list the treatments you want for each condition. Treatment may include pain medicine, surgery, blood transfusions, dialysis, IV or tube feedings, and a ventilator (breathing machine).  Values history:  This document has questions about your views, beliefs, and how you feel and think about life. This information can help others choose the care that you would choose.  Why are advance directives important?  An advance directive helps you control your care. Although spoken wishes may be used, it is better to have your wishes written down. Spoken wishes can be misunderstood, or not followed. Treatments may be given even if you do not want them. An advance directive may make it easier for your family to make difficult choices about your care.   Urinary Incontinence   Urinary incontinence (UI)  is when you lose control of your bladder. UI develops because your bladder cannot store or empty urine properly. The 3 most common types of UI are stress incontinence, urge incontinence, or both.  Medicines:   May be given to help strengthen your bladder control. Report any side effects of medication to your healthcare provider.  Do pelvic muscle exercises often:  Your pelvic muscles help you stop urinating. Squeeze these muscles tight for 5 seconds, then relax for 5 seconds. Gradually work up to squeezing for 10 seconds. Do 3 sets of 15 repetitions a day, or as directed. This will help strengthen your pelvic muscles and improve bladder control.  Train your bladder:  Go to the bathroom at set times, such as every  2 hours, even if you do not feel the urge to go. You can also try to hold your urine when you feel the urge to go. For example, hold your urine for 5 minutes when you feel the urge to go. As that becomes easier, hold your urine for 10 minutes.   Self-care:   Keep a UI record.  Write down how often you leak urine and how much you leak. Make a note of what you were doing when you leaked urine.  Drink liquids as directed. You may need to limit the amount of liquid you drink to help control your urine leakage. Do not drink any liquid right before you go to bed. Limit or do not have drinks that contain caffeine or alcohol.   Prevent constipation.  Eat a variety of high-fiber foods. Good examples are high-fiber cereals, beans, vegetables, and whole-grain breads. Walking is the best way to trigger your intestines to have a bowel movement.  Exercise regularly and maintain a healthy weight.  Weight loss and exercise will decrease pressure on your bladder and help you control your leakage.   Use a catheter as directed  to help empty your bladder. A catheter is a tiny, plastic tube that is put into your bladder to drain your urine.   Go to behavior therapy as directed.  Behavior therapy may be used to help you learn to control your urge to urinate.    Weight Management   Why it is important to manage your weight:  Being overweight increases your risk of health conditions such as heart disease, high blood pressure, type 2 diabetes, and certain types of cancer. It can also increase your risk for osteoarthritis, sleep apnea, and other respiratory problems. Aim for a slow, steady weight loss. Even a small amount of weight loss can lower your risk of health problems.  How to lose weight safely:  A safe and healthy way to lose weight is to eat fewer calories and get regular exercise. You can lose up about 1 pound a week by decreasing the number of calories you eat by 500 calories each day.   Healthy meal plan for weight management:   A healthy meal plan includes a variety of foods, contains fewer calories, and helps you stay healthy. A healthy meal plan includes the following:  Eat whole-grain foods more often.  A healthy meal plan should contain fiber. Fiber is the part of grains, fruits, and vegetables that is not broken down by your body. Whole-grain foods are healthy and provide extra fiber in your diet. Some examples of whole-grain foods are whole-wheat breads and pastas, oatmeal, brown rice, and bulgur.  Eat a variety of vegetables every day.  Include dark, leafy greens such as spinach, kale, shukri greens, and mustard greens. Eat yellow and orange vegetables such as carrots, sweet potatoes, and winter squash.   Eat a variety of fruits every day.  Choose fresh or canned fruit (canned in its own juice or light syrup) instead of juice. Fruit juice has very little or no fiber.  Eat low-fat dairy foods.  Drink fat-free (skim) milk or 1% milk. Eat fat-free yogurt and low-fat cottage cheese. Try low-fat cheeses such as mozzarella and other reduced-fat cheeses.  Choose meat and other protein foods that are low in fat.  Choose beans or other legumes such as split peas or lentils. Choose fish, skinless poultry (chicken or turkey), or lean cuts of red meat (beef or pork). Before you cook meat or poultry, cut off any visible fat.   Use less fat and oil.  Try baking foods instead of frying them. Add less fat, such as margarine, sour cream, regular salad dressing and mayonnaise to foods. Eat fewer high-fat foods. Some examples of high-fat foods include french fries, doughnuts, ice cream, and cakes.  Eat fewer sweets.  Limit foods and drinks that are high in sugar. This includes candy, cookies, regular soda, and sweetened drinks.  Exercise:  Exercise at least 30 minutes per day on most days of the week. Some examples of exercise include walking, biking, dancing, and swimming. You can also fit in more physical activity by taking the stairs instead of  the elevator or parking farther away from stores. Ask your healthcare provider about the best exercise plan for you.      © Copyright [employer] 2018 Information is for End User's use only and may not be sold, redistributed or otherwise used for commercial purposes. All illustrations and images included in CareNotes® are the copyrighted property of [employer] or [employer]      Medicare Preventive Visit Patient Instructions  Thank you for completing your Welcome to Medicare Visit or Medicare Annual Wellness Visit today. Your next wellness visit will be due in one year (6/11/2025).  The screening/preventive services that you may require over the next 5-10 years are detailed below. Some tests may not apply to you based off risk factors and/or age. Screening tests ordered at today's visit but not completed yet may show as past due. Also, please note that scanned in results may not display below.  Preventive Screenings:  Service Recommendations Previous Testing/Comments   Colorectal Cancer Screening  * Colonoscopy    * Fecal Occult Blood Test (FOBT)/Fecal Immunochemical Test (FIT)  * Fecal DNA/Cologuard Test  * Flexible Sigmoidoscopy Age: 45-75 years old   Colonoscopy: every 10 years (may be performed more frequently if at higher risk)  OR  FOBT/FIT: every 1 year  OR  Cologuard: every 3 years  OR  Sigmoidoscopy: every 5 years  Screening may be recommended earlier than age 45 if at higher risk for colorectal cancer. Also, an individualized decision between you and your healthcare provider will decide whether screening between the ages of 76-85 would be appropriate. Colonoscopy: 01/04/2023  FOBT/FIT: Not on file  Cologuard: 11/14/2022  Sigmoidoscopy: Not on file    Screening Current     Breast Cancer Screening Age: 40+ years old  Frequency: every 1-2 years  Not required if history of left and right mastectomy Mammogram: 07/09/2021        Cervical Cancer Screening Between the ages of 21-29, pap smear  recommended once every 3 years.   Between the ages of 30-65, can perform pap smear with HPV co-testing every 5 years.   Recommendations may differ for women with a history of total hysterectomy, cervical cancer, or abnormal pap smears in past. Pap Smear: Not on file    Screening Not Indicated   Hepatitis C Screening Once for adults born between 1945 and 1965  More frequently in patients at high risk for Hepatitis C Hep C Antibody: 10/28/2019    Screening Current   Diabetes Screening 1-2 times per year if you're at risk for diabetes or have pre-diabetes Fasting glucose: 95 mg/dL (1/25/2024)  A1C: No results in last 5 years (No results in last 5 years)  Screening Current   Cholesterol Screening Once every 5 years if you don't have a lipid disorder. May order more often based on risk factors. Lipid panel: 01/25/2024    Screening Current     Other Preventive Screenings Covered by Medicare:  Abdominal Aortic Aneurysm (AAA) Screening: covered once if your at risk. You're considered to be at risk if you have a family history of AAA.  Lung Cancer Screening: covers low dose CT scan once per year if you meet all of the following conditions: (1) Age 55-77; (2) No signs or symptoms of lung cancer; (3) Current smoker or have quit smoking within the last 15 years; (4) You have a tobacco smoking history of at least 20 pack years (packs per day multiplied by number of years you smoked); (5) You get a written order from a healthcare provider.  Glaucoma Screening: covered annually if you're considered high risk: (1) You have diabetes OR (2) Family history of glaucoma OR (3)  aged 50 and older OR (4)  American aged 65 and older  Osteoporosis Screening: covered every 2 years if you meet one of the following conditions: (1) You're estrogen deficient and at risk for osteoporosis based off medical history and other findings; (2) Have a vertebral abnormality; (3) On glucocorticoid therapy for more than 3 months; (4)  Have primary hyperparathyroidism; (5) On osteoporosis medications and need to assess response to drug therapy.   Last bone density test (DXA Scan): 09/17/2020.  HIV Screening: covered annually if you're between the age of 15-65. Also covered annually if you are younger than 15 and older than 65 with risk factors for HIV infection. For pregnant patients, it is covered up to 3 times per pregnancy.    Immunizations:  Immunization Recommendations   Influenza Vaccine Annual influenza vaccination during flu season is recommended for all persons aged >= 6 months who do not have contraindications   Pneumococcal Vaccine   * Pneumococcal conjugate vaccine = PCV13 (Prevnar 13), PCV15 (Vaxneuvance), PCV20 (Prevnar 20)  * Pneumococcal polysaccharide vaccine = PPSV23 (Pneumovax) Adults 19-63 yo with certain risk factors or if 65+ yo  If never received any pneumonia vaccine: recommend Prevnar 20 (PCV20)  Give PCV20 if previously received 1 dose of PCV13 or PPSV23   Hepatitis B Vaccine 3 dose series if at intermediate or high risk (ex: diabetes, end stage renal disease, liver disease)   Respiratory syncytial virus (RSV) Vaccine - COVERED BY MEDICARE PART D  * RSVPreF3 (Arexvy) CDC recommends that adults 60 years of age and older may receive a single dose of RSV vaccine using shared clinical decision-making (SCDM)   Tetanus (Td) Vaccine - COST NOT COVERED BY MEDICARE PART B Following completion of primary series, a booster dose should be given every 10 years to maintain immunity against tetanus. Td may also be given as tetanus wound prophylaxis.   Tdap Vaccine - COST NOT COVERED BY MEDICARE PART B Recommended at least once for all adults. For pregnant patients, recommended with each pregnancy.   Shingles Vaccine (Shingrix) - COST NOT COVERED BY MEDICARE PART B  2 shot series recommended in those 19 years and older who have or will have weakened immune systems or those 50 years and older     Health Maintenance Due:      Topic Date  Due    Breast Cancer Screening: Mammogram  07/09/2022    Colorectal Cancer Screening  01/03/2026    Hepatitis C Screening  Completed     Immunizations Due:      Topic Date Due    Hepatitis A Vaccine (1 of 2 - Risk 2-dose series) Never done    COVID-19 Vaccine (4 - 2023-24 season) 09/01/2023     Advance Directives   What are advance directives?  Advance directives are legal documents that state your wishes and plans for medical care. These plans are made ahead of time in case you lose your ability to make decisions for yourself. Advance directives can apply to any medical decision, such as the treatments you want, and if you want to donate organs.   What are the types of advance directives?  There are many types of advance directives, and each state has rules about how to use them. You may choose a combination of any of the following:  Living will:  This is a written record of the treatment you want. You can also choose which treatments you do not want, which to limit, and which to stop at a certain time. This includes surgery, medicine, IV fluid, and tube feedings.   Durable power of  for healthcare (DPAHC):  This is a written record that states who you want to make healthcare choices for you when you are unable to make them for yourself. This person, called a proxy, is usually a family member or a friend. You may choose more than 1 proxy.  Do not resuscitate (DNR) order:  A DNR order is used in case your heart stops beating or you stop breathing. It is a request not to have certain forms of treatment, such as CPR. A DNR order may be included in other types of advance directives.  Medical directive:  This covers the care that you want if you are in a coma, near death, or unable to make decisions for yourself. You can list the treatments you want for each condition. Treatment may include pain medicine, surgery, blood transfusions, dialysis, IV or tube feedings, and a ventilator (breathing machine).  Values  history:  This document has questions about your views, beliefs, and how you feel and think about life. This information can help others choose the care that you would choose.  Why are advance directives important?  An advance directive helps you control your care. Although spoken wishes may be used, it is better to have your wishes written down. Spoken wishes can be misunderstood, or not followed. Treatments may be given even if you do not want them. An advance directive may make it easier for your family to make difficult choices about your care.   Urinary Incontinence   Urinary incontinence (UI)  is when you lose control of your bladder. UI develops because your bladder cannot store or empty urine properly. The 3 most common types of UI are stress incontinence, urge incontinence, or both.  Medicines:   May be given to help strengthen your bladder control. Report any side effects of medication to your healthcare provider.  Do pelvic muscle exercises often:  Your pelvic muscles help you stop urinating. Squeeze these muscles tight for 5 seconds, then relax for 5 seconds. Gradually work up to squeezing for 10 seconds. Do 3 sets of 15 repetitions a day, or as directed. This will help strengthen your pelvic muscles and improve bladder control.  Train your bladder:  Go to the bathroom at set times, such as every 2 hours, even if you do not feel the urge to go. You can also try to hold your urine when you feel the urge to go. For example, hold your urine for 5 minutes when you feel the urge to go. As that becomes easier, hold your urine for 10 minutes.   Self-care:   Keep a UI record.  Write down how often you leak urine and how much you leak. Make a note of what you were doing when you leaked urine.  Drink liquids as directed. You may need to limit the amount of liquid you drink to help control your urine leakage. Do not drink any liquid right before you go to bed. Limit or do not have drinks that contain caffeine or  alcohol.   Prevent constipation.  Eat a variety of high-fiber foods. Good examples are high-fiber cereals, beans, vegetables, and whole-grain breads. Walking is the best way to trigger your intestines to have a bowel movement.  Exercise regularly and maintain a healthy weight.  Weight loss and exercise will decrease pressure on your bladder and help you control your leakage.   Use a catheter as directed  to help empty your bladder. A catheter is a tiny, plastic tube that is put into your bladder to drain your urine.   Go to behavior therapy as directed.  Behavior therapy may be used to help you learn to control your urge to urinate.    Weight Management   Why it is important to manage your weight:  Being overweight increases your risk of health conditions such as heart disease, high blood pressure, type 2 diabetes, and certain types of cancer. It can also increase your risk for osteoarthritis, sleep apnea, and other respiratory problems. Aim for a slow, steady weight loss. Even a small amount of weight loss can lower your risk of health problems.  How to lose weight safely:  A safe and healthy way to lose weight is to eat fewer calories and get regular exercise. You can lose up about 1 pound a week by decreasing the number of calories you eat by 500 calories each day.   Healthy meal plan for weight management:  A healthy meal plan includes a variety of foods, contains fewer calories, and helps you stay healthy. A healthy meal plan includes the following:  Eat whole-grain foods more often.  A healthy meal plan should contain fiber. Fiber is the part of grains, fruits, and vegetables that is not broken down by your body. Whole-grain foods are healthy and provide extra fiber in your diet. Some examples of whole-grain foods are whole-wheat breads and pastas, oatmeal, brown rice, and bulgur.  Eat a variety of vegetables every day.  Include dark, leafy greens such as spinach, kale, shukri greens, and mustard greens. Eat  yellow and orange vegetables such as carrots, sweet potatoes, and winter squash.   Eat a variety of fruits every day.  Choose fresh or canned fruit (canned in its own juice or light syrup) instead of juice. Fruit juice has very little or no fiber.  Eat low-fat dairy foods.  Drink fat-free (skim) milk or 1% milk. Eat fat-free yogurt and low-fat cottage cheese. Try low-fat cheeses such as mozzarella and other reduced-fat cheeses.  Choose meat and other protein foods that are low in fat.  Choose beans or other legumes such as split peas or lentils. Choose fish, skinless poultry (chicken or turkey), or lean cuts of red meat (beef or pork). Before you cook meat or poultry, cut off any visible fat.   Use less fat and oil.  Try baking foods instead of frying them. Add less fat, such as margarine, sour cream, regular salad dressing and mayonnaise to foods. Eat fewer high-fat foods. Some examples of high-fat foods include french fries, doughnuts, ice cream, and cakes.  Eat fewer sweets.  Limit foods and drinks that are high in sugar. This includes candy, cookies, regular soda, and sweetened drinks.  Exercise:  Exercise at least 30 minutes per day on most days of the week. Some examples of exercise include walking, biking, dancing, and swimming. You can also fit in more physical activity by taking the stairs instead of the elevator or parking farther away from stores. Ask your healthcare provider about the best exercise plan for you.      © Copyright [employer] 2018 Information is for End User's use only and may not be sold, redistributed or otherwise used for commercial purposes. All illustrations and images included in CareNotes® are the copyrighted property of [employer] or "[employer]

## 2024-08-16 ENCOUNTER — APPOINTMENT (OUTPATIENT)
Dept: RADIOLOGY | Age: 69
End: 2024-08-16
Payer: MEDICARE

## 2024-08-16 DIAGNOSIS — M25.551 RIGHT HIP PAIN: ICD-10-CM

## 2024-08-16 DIAGNOSIS — M54.50 LOW BACK PAIN, UNSPECIFIED BACK PAIN LATERALITY, UNSPECIFIED CHRONICITY, UNSPECIFIED WHETHER SCIATICA PRESENT: ICD-10-CM

## 2024-08-16 PROCEDURE — 72110 X-RAY EXAM L-2 SPINE 4/>VWS: CPT

## 2024-08-16 PROCEDURE — 73502 X-RAY EXAM HIP UNI 2-3 VIEWS: CPT

## 2024-10-23 DIAGNOSIS — I10 BENIGN ESSENTIAL HYPERTENSION: ICD-10-CM

## 2024-10-23 DIAGNOSIS — I10 UNCONTROLLED HYPERTENSION: ICD-10-CM

## 2024-10-24 RX ORDER — METOPROLOL SUCCINATE 100 MG/1
100 TABLET, EXTENDED RELEASE ORAL DAILY
Qty: 90 TABLET | Refills: 0 | Status: SHIPPED | OUTPATIENT
Start: 2024-10-24

## 2024-10-24 RX ORDER — SPIRONOLACTONE 50 MG/1
TABLET, FILM COATED ORAL
Qty: 90 TABLET | Refills: 0 | Status: SHIPPED | OUTPATIENT
Start: 2024-10-24

## 2024-11-12 DIAGNOSIS — M81.0 AGE-RELATED OSTEOPOROSIS WITHOUT CURRENT PATHOLOGICAL FRACTURE: ICD-10-CM

## 2024-11-13 RX ORDER — ALENDRONATE SODIUM 70 MG/1
TABLET ORAL
Qty: 12 TABLET | Refills: 0 | Status: SHIPPED | OUTPATIENT
Start: 2024-11-13

## 2024-12-19 ENCOUNTER — RA CDI HCC (OUTPATIENT)
Dept: OTHER | Facility: HOSPITAL | Age: 69
End: 2024-12-19

## 2024-12-26 ENCOUNTER — TELEPHONE (OUTPATIENT)
Age: 69
End: 2024-12-26

## 2024-12-26 ENCOUNTER — OFFICE VISIT (OUTPATIENT)
Dept: FAMILY MEDICINE CLINIC | Facility: CLINIC | Age: 69
End: 2024-12-26
Payer: MEDICARE

## 2024-12-26 VITALS
HEART RATE: 78 BPM | WEIGHT: 178 LBS | OXYGEN SATURATION: 90 % | SYSTOLIC BLOOD PRESSURE: 110 MMHG | DIASTOLIC BLOOD PRESSURE: 60 MMHG | TEMPERATURE: 96.7 F | HEIGHT: 63 IN | BODY MASS INDEX: 31.54 KG/M2

## 2024-12-26 DIAGNOSIS — Z23 NEEDS FLU SHOT: ICD-10-CM

## 2024-12-26 DIAGNOSIS — I10 BENIGN ESSENTIAL HYPERTENSION: Primary | ICD-10-CM

## 2024-12-26 DIAGNOSIS — Z23 NEED FOR COVID-19 VACCINE: ICD-10-CM

## 2024-12-26 DIAGNOSIS — H61.23 BILATERAL IMPACTED CERUMEN: ICD-10-CM

## 2024-12-26 DIAGNOSIS — M81.0 AGE-RELATED OSTEOPOROSIS WITHOUT CURRENT PATHOLOGICAL FRACTURE: ICD-10-CM

## 2024-12-26 DIAGNOSIS — F41.1 ANXIETY STATE: ICD-10-CM

## 2024-12-26 DIAGNOSIS — F33.1 MODERATE EPISODE OF RECURRENT MAJOR DEPRESSIVE DISORDER (HCC): ICD-10-CM

## 2024-12-26 DIAGNOSIS — F32.5 MAJOR DEPRESSIVE DISORDER WITH SINGLE EPISODE, IN FULL REMISSION (HCC): ICD-10-CM

## 2024-12-26 PROCEDURE — 90662 IIV NO PRSV INCREASED AG IM: CPT

## 2024-12-26 PROCEDURE — 91320 SARSCV2 VAC 30MCG TRS-SUC IM: CPT

## 2024-12-26 PROCEDURE — 90480 ADMN SARSCOV2 VAC 1/ONLY CMP: CPT

## 2024-12-26 PROCEDURE — 99214 OFFICE O/P EST MOD 30 MIN: CPT | Performed by: FAMILY MEDICINE

## 2024-12-26 PROCEDURE — G0008 ADMIN INFLUENZA VIRUS VAC: HCPCS

## 2024-12-26 PROCEDURE — 69209 REMOVE IMPACTED EAR WAX UNI: CPT | Performed by: FAMILY MEDICINE

## 2024-12-26 RX ORDER — VITAMIN B COMPLEX
1 CAPSULE ORAL DAILY
COMMUNITY

## 2024-12-26 NOTE — PROGRESS NOTES
Chief Complaint   Patient presents with    Follow-up        HPI   Here for follow-up of hypertension, hyponatremia, temporal arteritis, knee DJD,and depression.    Notes that her  recently completed CAR-T therapy at [facility].  Required her to spend 4 weeks down there in a hotel with Blu observing how he is doing.  Jose Juan got . Living in [place].  Mood is okay.  She is using 25 mg of sertraline.  Usual blood pressure medications.    Past Medical History:   Diagnosis Date    Age-related osteoporosis without current pathological fracture 02/14/2022    Anxiety state 12/19/2014    Arthritis     Asthma     Benign essential hypertension 01/14/2010    Chronic rhinitis 01/12/2012    Depression 2019    Diverticulitis 01/07/2023    Uncomplicated-treated with Augmentin    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 hair loss 02/04/2020    Fracture of multiple ribs of left side 12/21/2020    Hypertension     Hyponatremia 10/02/2020    Mild persistent asthma without complication 09/07/2017    Personal history of colonic polyps 01/04/2023    Multiple polyps removed 1/4/2023.  Recall 3 years.    Right chronic serous otitis media 11/07/2017    Resolved after myringotomy tube    Scapula fracture 12/21/2020    Temporal arteritis (HCC) 09/04/2020    Biopsy negative-treated with prednisone for 1 year        Past Surgical History:   Procedure Laterality Date    COLONOSCOPY      LAPAROSCOPY      X2 for endometriosis    RI LIGATION/BIOPSY TEMPORAL ARTERY Left 08/14/2020    Procedure: TEMPORAL ARTERY BIOPSY;  Surgeon: Yosvany Hardy MD;  Location: [facility];  Service: General    TOTAL KNEE ARTHROPLASTY Right 03/28/2022    Dr. Lake       Social History     Tobacco Use    Smoking status: Never    Smokeless tobacco: Never   Substance Use Topics    Alcohol use: Yes     Alcohol/week: 7.0 standard drinks of alcohol     Types: 4 Glasses of wine, 3 Shots of liquor per week     Comment: socially       Social History     Social History  "Narrative     since 1981.      Two sons.      Retired in 2018.  Was a .     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     Enjoys reading, gardening, walking, needle work, painting, writing books.    9/23-Richard living in [place].      Jose Juan  living in [place].Got   August 10, 2024.        The following portions of the patient's history were reviewed and updated as appropriate: allergies, current medications, past family history, past medical history, past social history, past surgical history, and problem list.      Review of Systems   Notes some difficulty hearing.  The TV or sometimes other people.    /60 (BP Location: Left arm, Patient Position: Sitting, Cuff Size: Large)   Pulse 78   Temp (!) 96.7 °F (35.9 °C) (Temporal)   Ht 5' 3\" (1.6 m)   Wt 80.7 kg (178 lb)   SpO2 90%   BMI 31.53 kg/m²      Physical Exam   Appears well.  Lungs are clear.  Heart regular.  Mood is upbeat.  Affect appropriate.  Wax present in both ear canals but blocking the drum on the right side.      Ear cerumen removal    Date/Time: 12/26/2024 10:20 AM    Performed by: Roman Zayas MD  Authorized by: Roman Zayas MD  Universal Protocol:  Consent: Verbal consent obtained.  Patient understanding: patient states understanding of the procedure being performed    Patient location:  Clinic  Procedure details:     Location:  R ear    Procedure type: irrigation only    Post-procedure details:     Complication:  None    Hearing quality:  Improved    Patient tolerance of procedure:  Tolerated well, no immediate complications                Current Outpatient Medications:     albuterol (2.5 mg/3 mL) 0.083 % nebulizer solution, Take 3 mL (2.5 mg total) by nebulization every 6 (six) hours as needed for wheezing or shortness of breath, Disp: 180 mL, Rfl: 0    albuterol (ProAir HFA) 90 mcg/act inhaler, Inhale 2 puffs every 6 (six) hours as needed for " wheezing, Disp: 8.5 g, Rfl: 5    alendronate (FOSAMAX) 70 mg tablet, TAKE 1 TAB WEEKLY ON EMPTY STOMACH IN THE UPRIGHT POSITION, Disp: 12 tablet, Rfl: 0    Aluminum & Magnesium Hydroxide (MAGNESIUM-ALUMINUM PO), Take by mouth, Disp: , Rfl:     amLODIPine (NORVASC) 5 mg tablet, 1 tablet alternating every other day with 1/2 tablet, Disp: , Rfl:     b complex vitamins capsule, Take 1 capsule by mouth daily, Disp: , Rfl:     budesonide-formoterol (Symbicort) 80-4.5 MCG/ACT inhaler, Inhale 2 puffs 2 (two) times a day Rinse mouth after use., Disp: 10.2 g, Rfl: 5    loratadine (CLARITIN) 10 mg tablet, Take 1 tablet (10 mg total) by mouth daily, Disp: 30 tablet, Rfl: 0    metoprolol succinate (TOPROL-XL) 100 mg 24 hr tablet, TAKE 1 TABLET BY MOUTH EVERY DAY, Disp: 90 tablet, Rfl: 0    olmesartan (BENICAR) 20 mg tablet, TAKE 1 TABLET BY MOUTH EVERY DAY, Disp: 90 tablet, Rfl: 3    Omega-3 1000 MG CAPS, Take by mouth, Disp: , Rfl:     sertraline (ZOLOFT) 50 mg tablet, Take 1 tablet (50 mg total) by mouth daily, Disp: 90 tablet, Rfl: 3    spironolactone (ALDACTONE) 50 mg tablet, TAKE 1 TABLET BY MOUTH EVERY DAY, Disp: 90 tablet, Rfl: 0     No problem-specific Assessment & Plan notes found for this encounter.       Diagnoses and all orders for this visit:    Benign essential hypertension  -     Lipid panel; Future  -     Basic metabolic panel; Future  -     TSH, 3rd generation with Free T4 reflex; Future    Moderate episode of recurrent major depressive disorder (HCC)  -     sertraline (ZOLOFT) 50 mg tablet; Take 1 tablet (50 mg total) by mouth daily    Major depressive disorder with single episode, in full remission (HCC)    Anxiety state    Age-related osteoporosis without current pathological fracture    Need for COVID-19 vaccine  -     COVID-19 Pfizer mRNA vaccine 12 yr and older (Comirnaty pre-filled syringe)    Needs flu shot  -     influenza vaccine, high-dose, PF 0.5 mL (Fluzone High Dose)    Other orders  -     b  complex vitamins capsule; Take 1 capsule by mouth daily  -     Ear cerumen removal        Patient Instructions   Blood pressure well-controlled.  Same medications.  Recheck blood work to include lipid profile, blood sugar and kidney function, and thyroid.  Wax removed from the right ear canal.  Small amount of wax removed from the left side as well.  Flu shot and COVID booster.  Recommend Shingrix vaccine at local drugstore.  Recheck in 6 months.

## 2024-12-26 NOTE — TELEPHONE ENCOUNTER
Spoke to Roxi who is feeling better after Tylenol.  Advised to call tomorrow on Friday if still symptomatic.

## 2024-12-26 NOTE — TELEPHONE ENCOUNTER
Patient called stating that she is having throbbing 8/10 right ear pain after being irrigated at the office this morning.  She has taken Tylenol for the pain but it isn't helping.  She is also having decreased hearing in the right ear.  Patient is asking for advice from Dr. Zayas.  Please call back to patient to advise.

## 2024-12-26 NOTE — PATIENT INSTRUCTIONS
Blood pressure well-controlled.  Same medications.  Recheck blood work to include lipid profile, blood sugar and kidney function, and thyroid.  Wax removed from the right ear canal.  Small amount of wax removed from the left side as well.  Flu shot and COVID booster.  Recommend Shingrix vaccine at local drugstore.  Recheck in 6 months.

## 2025-04-13 DIAGNOSIS — M81.0 AGE-RELATED OSTEOPOROSIS WITHOUT CURRENT PATHOLOGICAL FRACTURE: ICD-10-CM

## 2025-04-15 RX ORDER — ALENDRONATE SODIUM 70 MG/1
TABLET ORAL
Qty: 12 TABLET | Refills: 3 | Status: SHIPPED | OUTPATIENT
Start: 2025-04-15

## 2025-04-19 DIAGNOSIS — I10 BENIGN ESSENTIAL HYPERTENSION: ICD-10-CM

## 2025-04-19 DIAGNOSIS — I10 UNCONTROLLED HYPERTENSION: ICD-10-CM

## 2025-04-21 RX ORDER — SPIRONOLACTONE 50 MG/1
50 TABLET, FILM COATED ORAL DAILY
Qty: 30 TABLET | Refills: 0 | Status: SHIPPED | OUTPATIENT
Start: 2025-04-21

## 2025-04-21 RX ORDER — METOPROLOL SUCCINATE 100 MG/1
100 TABLET, EXTENDED RELEASE ORAL DAILY
Qty: 90 TABLET | Refills: 1 | Status: SHIPPED | OUTPATIENT
Start: 2025-04-21

## 2025-04-24 ENCOUNTER — APPOINTMENT (OUTPATIENT)
Dept: LAB | Age: 70
End: 2025-04-24
Payer: MEDICARE

## 2025-04-24 DIAGNOSIS — I10 BENIGN ESSENTIAL HYPERTENSION: ICD-10-CM

## 2025-04-24 LAB
ANION GAP SERPL CALCULATED.3IONS-SCNC: 8 MMOL/L (ref 4–13)
BUN SERPL-MCNC: 10 MG/DL (ref 5–25)
CALCIUM SERPL-MCNC: 9.6 MG/DL (ref 8.4–10.2)
CHLORIDE SERPL-SCNC: 92 MMOL/L (ref 96–108)
CHOLEST SERPL-MCNC: 236 MG/DL (ref ?–200)
CO2 SERPL-SCNC: 28 MMOL/L (ref 21–32)
CREAT SERPL-MCNC: 0.72 MG/DL (ref 0.6–1.3)
GFR SERPL CREATININE-BSD FRML MDRD: 83 ML/MIN/1.73SQ M
GLUCOSE P FAST SERPL-MCNC: 91 MG/DL (ref 65–99)
HDLC SERPL-MCNC: 60 MG/DL
LDLC SERPL CALC-MCNC: 161 MG/DL (ref 0–100)
NONHDLC SERPL-MCNC: 176 MG/DL
POTASSIUM SERPL-SCNC: 5.2 MMOL/L (ref 3.5–5.3)
SODIUM SERPL-SCNC: 128 MMOL/L (ref 135–147)
TRIGL SERPL-MCNC: 76 MG/DL (ref ?–150)
TSH SERPL DL<=0.05 MIU/L-ACNC: 2.08 UIU/ML (ref 0.45–4.5)

## 2025-04-24 PROCEDURE — 80061 LIPID PANEL: CPT

## 2025-04-24 PROCEDURE — 36415 COLL VENOUS BLD VENIPUNCTURE: CPT

## 2025-04-24 PROCEDURE — 80048 BASIC METABOLIC PNL TOTAL CA: CPT

## 2025-04-24 PROCEDURE — 84443 ASSAY THYROID STIM HORMONE: CPT

## 2025-04-25 ENCOUNTER — RESULTS FOLLOW-UP (OUTPATIENT)
Dept: FAMILY MEDICINE CLINIC | Facility: CLINIC | Age: 70
End: 2025-04-25

## 2025-04-25 NOTE — RESULT ENCOUNTER NOTE
Sodium 128, same as 2 years ago.  Cholesterol about the same as 2 years ago with a high amount of the good type.  Thyroid in the normal range.

## 2025-05-12 DIAGNOSIS — I10 BENIGN ESSENTIAL HYPERTENSION: ICD-10-CM

## 2025-05-13 RX ORDER — AMLODIPINE BESYLATE 5 MG/1
TABLET ORAL
Qty: 180 TABLET | Refills: 3 | Status: SHIPPED | OUTPATIENT
Start: 2025-05-13

## 2025-05-13 RX ORDER — AMLODIPINE BESYLATE 5 MG/1
7.5 TABLET ORAL DAILY
Qty: 135 TABLET | Refills: 3 | OUTPATIENT
Start: 2025-05-13

## 2025-05-13 RX ORDER — SPIRONOLACTONE 50 MG/1
50 TABLET, FILM COATED ORAL DAILY
Qty: 90 TABLET | Refills: 1 | Status: SHIPPED | OUTPATIENT
Start: 2025-05-13 | End: 2025-05-13 | Stop reason: SDUPTHER

## 2025-05-13 RX ORDER — SPIRONOLACTONE 50 MG/1
50 TABLET, FILM COATED ORAL DAILY
Qty: 90 TABLET | Refills: 3 | Status: SHIPPED | OUTPATIENT
Start: 2025-05-13

## 2025-06-17 ENCOUNTER — RA CDI HCC (OUTPATIENT)
Dept: OTHER | Facility: HOSPITAL | Age: 70
End: 2025-06-17

## 2025-06-23 ENCOUNTER — OFFICE VISIT (OUTPATIENT)
Dept: FAMILY MEDICINE CLINIC | Facility: CLINIC | Age: 70
End: 2025-06-23
Payer: MEDICARE

## 2025-06-23 VITALS
HEART RATE: 56 BPM | TEMPERATURE: 98 F | BODY MASS INDEX: 29.88 KG/M2 | SYSTOLIC BLOOD PRESSURE: 122 MMHG | OXYGEN SATURATION: 96 % | HEIGHT: 63 IN | RESPIRATION RATE: 16 BRPM | DIASTOLIC BLOOD PRESSURE: 82 MMHG | WEIGHT: 168.6 LBS

## 2025-06-23 DIAGNOSIS — I10 BENIGN ESSENTIAL HYPERTENSION: ICD-10-CM

## 2025-06-23 DIAGNOSIS — Z78.0 ASYMPTOMATIC POSTMENOPAUSAL STATE: ICD-10-CM

## 2025-06-23 DIAGNOSIS — Z12.31 ENCOUNTER FOR SCREENING MAMMOGRAM FOR BREAST CANCER: ICD-10-CM

## 2025-06-23 DIAGNOSIS — F41.1 ANXIETY STATE: ICD-10-CM

## 2025-06-23 DIAGNOSIS — Z00.00 MEDICARE ANNUAL WELLNESS VISIT, SUBSEQUENT: Primary | ICD-10-CM

## 2025-06-23 PROBLEM — F33.1 MODERATE EPISODE OF RECURRENT MAJOR DEPRESSIVE DISORDER (HCC): Status: RESOLVED | Noted: 2024-01-25 | Resolved: 2025-06-23

## 2025-06-23 PROCEDURE — G0439 PPPS, SUBSEQ VISIT: HCPCS | Performed by: FAMILY MEDICINE

## 2025-06-23 PROCEDURE — G2211 COMPLEX E/M VISIT ADD ON: HCPCS | Performed by: FAMILY MEDICINE

## 2025-06-23 PROCEDURE — 99214 OFFICE O/P EST MOD 30 MIN: CPT | Performed by: FAMILY MEDICINE

## 2025-06-23 NOTE — PROGRESS NOTES
Name: Renetta Velasquez      : 1955      MRN: [id number]  Encounter Provider: Roman Zayas MD  Encounter Date: 2025   Encounter department: [facility]  :  Assessment & Plan  Encounter for screening mammogram for breast cancer    Orders:  •  Mammo screening bilateral w 3d and cad; Future    Medicare annual wellness visit, subsequent         Asymptomatic postmenopausal state    Orders:  •  DXA bone density spine hip and pelvis; Future    Benign essential hypertension    Anxiety state       Preventive health issues were discussed with patient, and age appropriate screening tests were ordered as noted in patient's After Visit Summary. Personalized health advice and appropriate referrals for health education or preventive services given if needed, as noted in patient's After Visit Summary.    History of Present Illness     Leg Pain        Patient Care Team:  Roman Zayas MD as PCP - General (Family Medicine)    Review of Systems  Medical History Reviewed by provider this encounter:  Tobacco  Allergies  Meds  Problems  Med Hx  Surg Hx  Fam Hx       Annual Wellness Visit Questionnaire   Renetta is here for her Subsequent Wellness visit.     Health Risk Assessment:   Patient rates overall health as good. Patient feels that their physical health rating is much better. Patient is satisfied with their life. Eyesight was rated as same. Hearing was rated as slightly worse. Patient feels that their emotional and mental health rating is same. Patients states they are never, rarely angry. Patient states they are never, rarely unusually tired/fatigued. Pain experienced in the last 7 days has been some. Patient's pain rating has been 8/10. Patient states that she has experienced weight loss or gain in last 6 months. Lost 8lbs when pt stopped drinking alcohol    Depression Screening:   PHQ-9 Score: 1      Fall Risk Screening:   In the past year, patient has experienced: no history of falling in  past year      Urinary Incontinence Screening:   Patient has leaked urine accidently in the last six months.     Home Safety:  Patient has trouble with stairs inside or outside of their home. Patient has working smoke alarms and has working carbon monoxide detector. Home safety hazards include: none.     Nutrition:   Current diet is Regular.     Medications:   Patient is currently taking over-the-counter supplements. OTC medications include: see medication list. Patient is able to manage medications.     Activities of Daily Living (ADLs)/Instrumental Activities of Daily Living (IADLs):   Walk and transfer into and out of bed and chair?: Yes  Dress and groom yourself?: Yes    Bathe or shower yourself?: Yes    Feed yourself? Yes  Do your laundry/housekeeping?: Yes  Manage your money, pay your bills and track your expenses?: Yes  Make your own meals?: Yes    Do your own shopping?: Yes    Previous Hospitalizations:   Any hospitalizations or ED visits within the last 12 months?: No      Preventive Screenings      Cardiovascular Screening:    General: Screening Current      Diabetes Screening:     General: Screening Current      Colorectal Cancer Screening:     General: Screening Current      Cervical Cancer Screening:    General: Screening Not Indicated      Osteoporosis Screening:    General: Screening Not Indicated and History Osteoporosis      Lung Cancer Screening:     General: Screening Not Indicated      Hepatitis C Screening:    General: Screening Current    Immunizations:  - Immunizations due: Zoster (Shingrix)    Screening, Brief Intervention, and Referral to Treatment (SBIRT)     Screening    Typical number of drinks in a week: 0    Single Item Drug Screening:  How often have you used an illegal drug (including marijuana) or a prescription medication for non-medical reasons in the past year? never    Single Item Drug Screen Score: 0  Interpretation: Negative screen for possible drug use disorder    Social  "Drivers of Health     Financial Resource Strain: Low Risk  (2/23/2023)    Overall Financial Resource Strain (CARDIA)    • Difficulty of Paying Living Expenses: Not hard at all   Food Insecurity: No Food Insecurity (6/10/2024)    Nursing - Inadequate Food Risk Classification    • Worried About Running Out of Food in the Last Year: Never true    • Ran Out of Food in the Last Year: Never true   Transportation Needs: No Transportation Needs (6/10/2024)    PRAPARE - Transportation    • Lack of Transportation (Medical): No    • Lack of Transportation (Non-Medical): No   Housing Stability: Unknown (6/10/2024)    Housing Stability Vital Sign    • Unable to Pay for Housing in the Last Year: No    • Homeless in the Last Year: No   Utilities: Not At Risk (6/10/2024)    [facility] Utilities    • Threatened with loss of utilities: No     No results found.    Objective   /82 (BP Location: Left arm, Patient Position: Sitting, Cuff Size: Large)   Pulse 56   Temp 98 °F (36.7 °C) (Tympanic)   Resp 16   Ht 5' 3\" (1.6 m)   Wt 76.5 kg (168 lb 9.6 oz)   SpO2 96%   BMI 29.87 kg/m²     Physical Exam    "

## 2025-06-23 NOTE — PATIENT INSTRUCTIONS
Medicare wellness exam is completed.  Repeat DEXA scan.  Alendronate was started when she was on prednisone.  Never had a DEXA that showed osteoporosis and if DEXA scan is okay, alendronate could be stopped.  Continue blood pressure medications.  Dose of amlodipine is 5 alternating every other day with 2.5.  Plus metoprolol.  Review of lipid profile which was okay not needing alendronate.  She is using a supplement which probably will not make a lot of difference.  Prescription also given for mammogram.  Recheck in 6 months.    Medicare Preventive Visit Patient Instructions  Thank you for completing your Welcome to Medicare Visit or Medicare Annual Wellness Visit today. Your next wellness visit will be due in one year (6/24/2026).  The screening/preventive services that you may require over the next 5-10 years are detailed below. Some tests may not apply to you based off risk factors and/or age. Screening tests ordered at today's visit but not completed yet may show as past due. Also, please note that scanned in results may not display below.  Preventive Screenings:  Service Recommendations Previous Testing/Comments   Colorectal Cancer Screening  * Colonoscopy    * Fecal Occult Blood Test (FOBT)/Fecal Immunochemical Test (FIT)  * Fecal DNA/Cologuard Test  * Flexible Sigmoidoscopy Age: 45-75 years old   Colonoscopy: every 10 years (may be performed more frequently if at higher risk)  OR  FOBT/FIT: every 1 year  OR  Cologuard: every 3 years  OR  Sigmoidoscopy: every 5 years  Screening may be recommended earlier than age 45 if at higher risk for colorectal cancer. Also, an individualized decision between you and your healthcare provider will decide whether screening between the ages of 76-85 would be appropriate. Colonoscopy: 01/04/2023  FOBT/FIT: Not on file  Cologuard: 11/14/2022  Sigmoidoscopy: Not on file    Screening Current     Breast Cancer Screening Age: 40+ years old  Frequency: every 1-2 years  Not required  if history of left and right mastectomy Mammogram: 07/09/2021        Cervical Cancer Screening Between the ages of 21-29, pap smear recommended once every 3 years.   Between the ages of 30-65, can perform pap smear with HPV co-testing every 5 years.   Recommendations may differ for women with a history of total hysterectomy, cervical cancer, or abnormal pap smears in past. Pap Smear: Not on file    Screening Not Indicated   Hepatitis C Screening Once for adults born between 1945 and 1965  More frequently in patients at high risk for Hepatitis C Hep C Antibody: 10/28/2019    Screening Current   Diabetes Screening 1-2 times per year if you're at risk for diabetes or have pre-diabetes Fasting glucose: 91 mg/dL (4/24/2025)  A1C: No results in last 5 years (No results in last 5 years)  Screening Current   Cholesterol Screening Once every 5 years if you don't have a lipid disorder. May order more often based on risk factors. Lipid panel: 04/24/2025    Screening Current     Other Preventive Screenings Covered by Medicare:  Abdominal Aortic Aneurysm (AAA) Screening: covered once if your at risk. You're considered to be at risk if you have a family history of AAA.  Lung Cancer Screening: covers low dose CT scan once per year if you meet all of the following conditions: (1) Age 55-77; (2) No signs or symptoms of lung cancer; (3) Current smoker or have quit smoking within the last 15 years; (4) You have a tobacco smoking history of at least 20 pack years (packs per day multiplied by number of years you smoked); (5) You get a written order from a healthcare provider.  Glaucoma Screening: covered annually if you're considered high risk: (1) You have diabetes OR (2) Family history of glaucoma OR (3)  aged 50 and older OR (4)  American aged 65 and older  Osteoporosis Screening: covered every 2 years if you meet one of the following conditions: (1) You're estrogen deficient and at risk for osteoporosis based  off medical history and other findings; (2) Have a vertebral abnormality; (3) On glucocorticoid therapy for more than 3 months; (4) Have primary hyperparathyroidism; (5) On osteoporosis medications and need to assess response to drug therapy.   Last bone density test (DXA Scan): 09/17/2020.  HIV Screening: covered annually if you're between the age of 15-65. Also covered annually if you are younger than 15 and older than 65 with risk factors for HIV infection. For pregnant patients, it is covered up to 3 times per pregnancy.    Immunizations:  Immunization Recommendations   Influenza Vaccine Annual influenza vaccination during flu season is recommended for all persons aged >= 6 months who do not have contraindications   Pneumococcal Vaccine   * Pneumococcal conjugate vaccine = PCV13 (Prevnar 13), PCV15 (Vaxneuvance), PCV20 (Prevnar 20)  * Pneumococcal polysaccharide vaccine = PPSV23 (Pneumovax) Adults 19-63 yo with certain risk factors or if 65+ yo  If never received any pneumonia vaccine: recommend Prevnar 20 (PCV20)  Give PCV20 if previously received 1 dose of PCV13 or PPSV23   Hepatitis B Vaccine 3 dose series if at intermediate or high risk (ex: diabetes, end stage renal disease, liver disease)   Respiratory syncytial virus (RSV) Vaccine - COVERED BY MEDICARE PART D  * RSVPreF3 (Arexvy) CDC recommends that adults 60 years of age and older may receive a single dose of RSV vaccine using shared clinical decision-making (SCDM)   Tetanus (Td) Vaccine - COST NOT COVERED BY MEDICARE PART B Following completion of primary series, a booster dose should be given every 10 years to maintain immunity against tetanus. Td may also be given as tetanus wound prophylaxis.   Tdap Vaccine - COST NOT COVERED BY MEDICARE PART B Recommended at least once for all adults. For pregnant patients, recommended with each pregnancy.   Shingles Vaccine (Shingrix) - COST NOT COVERED BY MEDICARE PART B  2 shot series recommended in those 19  years and older who have or will have weakened immune systems or those 50 years and older     Health Maintenance Due:      Topic Date Due    Breast Cancer Screening: Mammogram  07/09/2022    Colorectal Cancer Screening  01/03/2026    Hepatitis C Screening  Completed     Immunizations Due:      Topic Date Due    COVID-19 Vaccine (5 - 2024-25 season) 06/26/2025     Advance Directives   What are advance directives?  Advance directives are legal documents that state your wishes and plans for medical care. These plans are made ahead of time in case you lose your ability to make decisions for yourself. Advance directives can apply to any medical decision, such as the treatments you want, and if you want to donate organs.   What are the types of advance directives?  There are many types of advance directives, and each state has rules about how to use them. You may choose a combination of any of the following:  Living will:  This is a written record of the treatment you want. You can also choose which treatments you do not want, which to limit, and which to stop at a certain time. This includes surgery, medicine, IV fluid, and tube feedings.   Durable power of  for healthcare (DPAHC):  This is a written record that states who you want to make healthcare choices for you when you are unable to make them for yourself. This person, called a proxy, is usually a family member or a friend. You may choose more than 1 proxy.  Do not resuscitate (DNR) order:  A DNR order is used in case your heart stops beating or you stop breathing. It is a request not to have certain forms of treatment, such as CPR. A DNR order may be included in other types of advance directives.  Medical directive:  This covers the care that you want if you are in a coma, near death, or unable to make decisions for yourself. You can list the treatments you want for each condition. Treatment may include pain medicine, surgery, blood transfusions, dialysis,  IV or tube feedings, and a ventilator (breathing machine).  Values history:  This document has questions about your views, beliefs, and how you feel and think about life. This information can help others choose the care that you would choose.  Why are advance directives important?  An advance directive helps you control your care. Although spoken wishes may be used, it is better to have your wishes written down. Spoken wishes can be misunderstood, or not followed. Treatments may be given even if you do not want them. An advance directive may make it easier for your family to make difficult choices about your care.   Urinary Incontinence   Urinary incontinence (UI)  is when you lose control of your bladder. UI develops because your bladder cannot store or empty urine properly. The 3 most common types of UI are stress incontinence, urge incontinence, or both.  Medicines:   May be given to help strengthen your bladder control. Report any side effects of medication to your healthcare provider.  Do pelvic muscle exercises often:  Your pelvic muscles help you stop urinating. Squeeze these muscles tight for 5 seconds, then relax for 5 seconds. Gradually work up to squeezing for 10 seconds. Do 3 sets of 15 repetitions a day, or as directed. This will help strengthen your pelvic muscles and improve bladder control.  Train your bladder:  Go to the bathroom at set times, such as every 2 hours, even if you do not feel the urge to go. You can also try to hold your urine when you feel the urge to go. For example, hold your urine for 5 minutes when you feel the urge to go. As that becomes easier, hold your urine for 10 minutes.   Self-care:   Keep a UI record.  Write down how often you leak urine and how much you leak. Make a note of what you were doing when you leaked urine.  Drink liquids as directed. You may need to limit the amount of liquid you drink to help control your urine leakage. Do not drink any liquid right before you  go to bed. Limit or do not have drinks that contain caffeine or alcohol.   Prevent constipation.  Eat a variety of high-fiber foods. Good examples are high-fiber cereals, beans, vegetables, and whole-grain breads. Walking is the best way to trigger your intestines to have a bowel movement.  Exercise regularly and maintain a healthy weight.  Weight loss and exercise will decrease pressure on your bladder and help you control your leakage.   Use a catheter as directed  to help empty your bladder. A catheter is a tiny, plastic tube that is put into your bladder to drain your urine.   Go to behavior therapy as directed.  Behavior therapy may be used to help you learn to control your urge to urinate.    Weight Management   Why it is important to manage your weight:  Being overweight increases your risk of health conditions such as heart disease, high blood pressure, type 2 diabetes, and certain types of cancer. It can also increase your risk for osteoarthritis, sleep apnea, and other respiratory problems. Aim for a slow, steady weight loss. Even a small amount of weight loss can lower your risk of health problems.  How to lose weight safely:  A safe and healthy way to lose weight is to eat fewer calories and get regular exercise. You can lose up about 1 pound a week by decreasing the number of calories you eat by 500 calories each day.   Healthy meal plan for weight management:  A healthy meal plan includes a variety of foods, contains fewer calories, and helps you stay healthy. A healthy meal plan includes the following:  Eat whole-grain foods more often.  A healthy meal plan should contain fiber. Fiber is the part of grains, fruits, and vegetables that is not broken down by your body. Whole-grain foods are healthy and provide extra fiber in your diet. Some examples of whole-grain foods are whole-wheat breads and pastas, oatmeal, brown rice, and bulgur.  Eat a variety of vegetables every day.  Include dark, leafy greens  such as spinach, kale, shukri greens, and mustard greens. Eat yellow and orange vegetables such as carrots, sweet potatoes, and winter squash.   Eat a variety of fruits every day.  Choose fresh or canned fruit (canned in its own juice or light syrup) instead of juice. Fruit juice has very little or no fiber.  Eat low-fat dairy foods.  Drink fat-free (skim) milk or 1% milk. Eat fat-free yogurt and low-fat cottage cheese. Try low-fat cheeses such as mozzarella and other reduced-fat cheeses.  Choose meat and other protein foods that are low in fat.  Choose beans or other legumes such as split peas or lentils. Choose fish, skinless poultry (chicken or turkey), or lean cuts of red meat (beef or pork). Before you cook meat or poultry, cut off any visible fat.   Use less fat and oil.  Try baking foods instead of frying them. Add less fat, such as margarine, sour cream, regular salad dressing and mayonnaise to foods. Eat fewer high-fat foods. Some examples of high-fat foods include french fries, doughnuts, ice cream, and cakes.  Eat fewer sweets.  Limit foods and drinks that are high in sugar. This includes candy, cookies, regular soda, and sweetened drinks.  Exercise:  Exercise at least 30 minutes per day on most days of the week. Some examples of exercise include walking, biking, dancing, and swimming. You can also fit in more physical activity by taking the stairs instead of the elevator or parking farther away from stores. Ask your healthcare provider about the best exercise plan for you.    © Copyright [employer] 2018 Information is for End User's use only and may not be sold, redistributed or otherwise used for commercial purposes. All illustrations and images included in CareNotes® are the copyrighted property of [employer] or [employer]      Medicare Preventive Visit Patient Instructions  Thank you for completing your Welcome to Medicare Visit or Medicare Annual Wellness Visit today. Your next  wellness visit will be due in one year (6/24/2026).  The screening/preventive services that you may require over the next 5-10 years are detailed below. Some tests may not apply to you based off risk factors and/or age. Screening tests ordered at today's visit but not completed yet may show as past due. Also, please note that scanned in results may not display below.  Preventive Screenings:  Service Recommendations Previous Testing/Comments   Colorectal Cancer Screening  * Colonoscopy    * Fecal Occult Blood Test (FOBT)/Fecal Immunochemical Test (FIT)  * Fecal DNA/Cologuard Test  * Flexible Sigmoidoscopy Age: 45-75 years old   Colonoscopy: every 10 years (may be performed more frequently if at higher risk)  OR  FOBT/FIT: every 1 year  OR  Cologuard: every 3 years  OR  Sigmoidoscopy: every 5 years  Screening may be recommended earlier than age 45 if at higher risk for colorectal cancer. Also, an individualized decision between you and your healthcare provider will decide whether screening between the ages of 76-85 would be appropriate. Colonoscopy: 01/04/2023  FOBT/FIT: Not on file  Cologuard: 11/14/2022  Sigmoidoscopy: Not on file    Screening Current     Breast Cancer Screening Age: 40+ years old  Frequency: every 1-2 years  Not required if history of left and right mastectomy Mammogram: 07/09/2021        Cervical Cancer Screening Between the ages of 21-29, pap smear recommended once every 3 years.   Between the ages of 30-65, can perform pap smear with HPV co-testing every 5 years.   Recommendations may differ for women with a history of total hysterectomy, cervical cancer, or abnormal pap smears in past. Pap Smear: Not on file    Screening Not Indicated   Hepatitis C Screening Once for adults born between 1945 and 1965  More frequently in patients at high risk for Hepatitis C Hep C Antibody: 10/28/2019    Screening Current   Diabetes Screening 1-2 times per year if you're at risk for diabetes or have pre-diabetes  Fasting glucose: 91 mg/dL (4/24/2025)  A1C: No results in last 5 years (No results in last 5 years)  Screening Current   Cholesterol Screening Once every 5 years if you don't have a lipid disorder. May order more often based on risk factors. Lipid panel: 04/24/2025    Screening Current     Other Preventive Screenings Covered by Medicare:  Abdominal Aortic Aneurysm (AAA) Screening: covered once if your at risk. You're considered to be at risk if you have a family history of AAA.  Lung Cancer Screening: covers low dose CT scan once per year if you meet all of the following conditions: (1) Age 55-77; (2) No signs or symptoms of lung cancer; (3) Current smoker or have quit smoking within the last 15 years; (4) You have a tobacco smoking history of at least 20 pack years (packs per day multiplied by number of years you smoked); (5) You get a written order from a healthcare provider.  Glaucoma Screening: covered annually if you're considered high risk: (1) You have diabetes OR (2) Family history of glaucoma OR (3)  aged 50 and older OR (4)  American aged 65 and older  Osteoporosis Screening: covered every 2 years if you meet one of the following conditions: (1) You're estrogen deficient and at risk for osteoporosis based off medical history and other findings; (2) Have a vertebral abnormality; (3) On glucocorticoid therapy for more than 3 months; (4) Have primary hyperparathyroidism; (5) On osteoporosis medications and need to assess response to drug therapy.   Last bone density test (DXA Scan): 09/17/2020.  HIV Screening: covered annually if you're between the age of 15-65. Also covered annually if you are younger than 15 and older than 65 with risk factors for HIV infection. For pregnant patients, it is covered up to 3 times per pregnancy.    Immunizations:  Immunization Recommendations   Influenza Vaccine Annual influenza vaccination during flu season is recommended for all persons aged >= 6  months who do not have contraindications   Pneumococcal Vaccine   * Pneumococcal conjugate vaccine = PCV13 (Prevnar 13), PCV15 (Vaxneuvance), PCV20 (Prevnar 20)  * Pneumococcal polysaccharide vaccine = PPSV23 (Pneumovax) Adults 19-63 yo with certain risk factors or if 65+ yo  If never received any pneumonia vaccine: recommend Prevnar 20 (PCV20)  Give PCV20 if previously received 1 dose of PCV13 or PPSV23   Hepatitis B Vaccine 3 dose series if at intermediate or high risk (ex: diabetes, end stage renal disease, liver disease)   Respiratory syncytial virus (RSV) Vaccine - COVERED BY MEDICARE PART D  * RSVPreF3 (Arexvy) CDC recommends that adults 60 years of age and older may receive a single dose of RSV vaccine using shared clinical decision-making (SCDM)   Tetanus (Td) Vaccine - COST NOT COVERED BY MEDICARE PART B Following completion of primary series, a booster dose should be given every 10 years to maintain immunity against tetanus. Td may also be given as tetanus wound prophylaxis.   Tdap Vaccine - COST NOT COVERED BY MEDICARE PART B Recommended at least once for all adults. For pregnant patients, recommended with each pregnancy.   Shingles Vaccine (Shingrix) - COST NOT COVERED BY MEDICARE PART B  2 shot series recommended in those 19 years and older who have or will have weakened immune systems or those 50 years and older     Health Maintenance Due:      Topic Date Due    Breast Cancer Screening: Mammogram  07/09/2022    Colorectal Cancer Screening  01/03/2026    Hepatitis C Screening  Completed     Immunizations Due:      Topic Date Due    COVID-19 Vaccine (5 - 2024-25 season) 06/26/2025     Advance Directives   What are advance directives?  Advance directives are legal documents that state your wishes and plans for medical care. These plans are made ahead of time in case you lose your ability to make decisions for yourself. Advance directives can apply to any medical decision, such as the treatments you want,  and if you want to donate organs.   What are the types of advance directives?  There are many types of advance directives, and each state has rules about how to use them. You may choose a combination of any of the following:  Living will:  This is a written record of the treatment you want. You can also choose which treatments you do not want, which to limit, and which to stop at a certain time. This includes surgery, medicine, IV fluid, and tube feedings.   Durable power of  for healthcare (DPAHC):  This is a written record that states who you want to make healthcare choices for you when you are unable to make them for yourself. This person, called a proxy, is usually a family member or a friend. You may choose more than 1 proxy.  Do not resuscitate (DNR) order:  A DNR order is used in case your heart stops beating or you stop breathing. It is a request not to have certain forms of treatment, such as CPR. A DNR order may be included in other types of advance directives.  Medical directive:  This covers the care that you want if you are in a coma, near death, or unable to make decisions for yourself. You can list the treatments you want for each condition. Treatment may include pain medicine, surgery, blood transfusions, dialysis, IV or tube feedings, and a ventilator (breathing machine).  Values history:  This document has questions about your views, beliefs, and how you feel and think about life. This information can help others choose the care that you would choose.  Why are advance directives important?  An advance directive helps you control your care. Although spoken wishes may be used, it is better to have your wishes written down. Spoken wishes can be misunderstood, or not followed. Treatments may be given even if you do not want them. An advance directive may make it easier for your family to make difficult choices about your care.   Urinary Incontinence   Urinary incontinence (UI)  is when you lose  control of your bladder. UI develops because your bladder cannot store or empty urine properly. The 3 most common types of UI are stress incontinence, urge incontinence, or both.  Medicines:   May be given to help strengthen your bladder control. Report any side effects of medication to your healthcare provider.  Do pelvic muscle exercises often:  Your pelvic muscles help you stop urinating. Squeeze these muscles tight for 5 seconds, then relax for 5 seconds. Gradually work up to squeezing for 10 seconds. Do 3 sets of 15 repetitions a day, or as directed. This will help strengthen your pelvic muscles and improve bladder control.  Train your bladder:  Go to the bathroom at set times, such as every 2 hours, even if you do not feel the urge to go. You can also try to hold your urine when you feel the urge to go. For example, hold your urine for 5 minutes when you feel the urge to go. As that becomes easier, hold your urine for 10 minutes.   Self-care:   Keep a UI record.  Write down how often you leak urine and how much you leak. Make a note of what you were doing when you leaked urine.  Drink liquids as directed. You may need to limit the amount of liquid you drink to help control your urine leakage. Do not drink any liquid right before you go to bed. Limit or do not have drinks that contain caffeine or alcohol.   Prevent constipation.  Eat a variety of high-fiber foods. Good examples are high-fiber cereals, beans, vegetables, and whole-grain breads. Walking is the best way to trigger your intestines to have a bowel movement.  Exercise regularly and maintain a healthy weight.  Weight loss and exercise will decrease pressure on your bladder and help you control your leakage.   Use a catheter as directed  to help empty your bladder. A catheter is a tiny, plastic tube that is put into your bladder to drain your urine.   Go to behavior therapy as directed.  Behavior therapy may be used to help you learn to control your  urge to urinate.    Weight Management   Why it is important to manage your weight:  Being overweight increases your risk of health conditions such as heart disease, high blood pressure, type 2 diabetes, and certain types of cancer. It can also increase your risk for osteoarthritis, sleep apnea, and other respiratory problems. Aim for a slow, steady weight loss. Even a small amount of weight loss can lower your risk of health problems.  How to lose weight safely:  A safe and healthy way to lose weight is to eat fewer calories and get regular exercise. You can lose up about 1 pound a week by decreasing the number of calories you eat by 500 calories each day.   Healthy meal plan for weight management:  A healthy meal plan includes a variety of foods, contains fewer calories, and helps you stay healthy. A healthy meal plan includes the following:  Eat whole-grain foods more often.  A healthy meal plan should contain fiber. Fiber is the part of grains, fruits, and vegetables that is not broken down by your body. Whole-grain foods are healthy and provide extra fiber in your diet. Some examples of whole-grain foods are whole-wheat breads and pastas, oatmeal, brown rice, and bulgur.  Eat a variety of vegetables every day.  Include dark, leafy greens such as spinach, kale, shukri greens, and mustard greens. Eat yellow and orange vegetables such as carrots, sweet potatoes, and winter squash.   Eat a variety of fruits every day.  Choose fresh or canned fruit (canned in its own juice or light syrup) instead of juice. Fruit juice has very little or no fiber.  Eat low-fat dairy foods.  Drink fat-free (skim) milk or 1% milk. Eat fat-free yogurt and low-fat cottage cheese. Try low-fat cheeses such as mozzarella and other reduced-fat cheeses.  Choose meat and other protein foods that are low in fat.  Choose beans or other legumes such as split peas or lentils. Choose fish, skinless poultry (chicken or turkey), or lean cuts of red  meat (beef or pork). Before you cook meat or poultry, cut off any visible fat.   Use less fat and oil.  Try baking foods instead of frying them. Add less fat, such as margarine, sour cream, regular salad dressing and mayonnaise to foods. Eat fewer high-fat foods. Some examples of high-fat foods include french fries, doughnuts, ice cream, and cakes.  Eat fewer sweets.  Limit foods and drinks that are high in sugar. This includes candy, cookies, regular soda, and sweetened drinks.  Exercise:  Exercise at least 30 minutes per day on most days of the week. Some examples of exercise include walking, biking, dancing, and swimming. You can also fit in more physical activity by taking the stairs instead of the elevator or parking farther away from stores. Ask your healthcare provider about the best exercise plan for you.    © Copyright [employer] 2018 Information is for End User's use only and may not be sold, redistributed or otherwise used for commercial purposes. All illustrations and images included in CareNotes® are the copyrighted property of [employer] or [employer]

## 2025-06-23 NOTE — PROGRESS NOTES
Name: Renetta Velasquez      : 1955      MRN: [id number]  Encounter Provider: Roman Zayas MD  Encounter Date: 2025   Encounter department: [facility]  :  Assessment & Plan  Medicare annual wellness visit, subsequent         Asymptomatic postmenopausal state    Orders:  •  DXA bone density spine hip and pelvis; Future    Benign essential hypertension         Anxiety state         Encounter for screening mammogram for breast cancer    Orders:  •  Mammo screening bilateral w 3d and cad; Future         Preventive health issues were discussed with patient, and age appropriate screening tests were ordered as noted in patient's After Visit Summary. Personalized health advice and appropriate referrals for health education or preventive services given if needed, as noted in patient's After Visit Summary.    History of Present Illness     HPI   Patient Care Team:  Roman Zayas MD as PCP - General (Family Medicine)    Review of Systems  Medical History Reviewed by provider this encounter:  Tobacco  Allergies  Meds  Problems  Med Hx  Surg Hx  Fam Hx       Annual Wellness Visit Questionnaire       Depression Screening:   PHQ-9 Score: 1      Social Drivers of Health     Financial Resource Strain: Low Risk  (2023)    Overall Financial Resource Strain (CARDIA)    • Difficulty of Paying Living Expenses: Not hard at all   Food Insecurity: No Food Insecurity (6/10/2024)    Nursing - Inadequate Food Risk Classification    • Worried About Running Out of Food in the Last Year: Never true    • Ran Out of Food in the Last Year: Never true   Transportation Needs: No Transportation Needs (6/10/2024)    PRAPARE - Transportation    • Lack of Transportation (Medical): No    • Lack of Transportation (Non-Medical): No   Housing Stability: Unknown (6/10/2024)    Housing Stability Vital Sign    • Unable to Pay for Housing in the Last Year: No    • Homeless in the Last Year: No   Utilities: Not At  "Risk (6/10/2024)    [facility] Utilities    • Threatened with loss of utilities: No     No results found.    Objective   /82 (BP Location: Left arm, Patient Position: Sitting, Cuff Size: Large)   Pulse 56   Temp 98 °F (36.7 °C) (Tympanic)   Resp 16   Ht 5' 3\" (1.6 m)   Wt 76.5 kg (168 lb 9.6 oz)   SpO2 96%   BMI 29.87 kg/m²     Physical Exam      "

## 2025-06-23 NOTE — PROGRESS NOTES
Name: Renetta Velasquez      : 1955      MRN: [id number]  Encounter Provider: Roman Zayas MD  Encounter Date: 2025   Encounter department: [facility]    Assessment & Plan  Encounter for screening mammogram for breast cancer    Orders:  •  Mammo screening bilateral w 3d and cad; Future    Medicare annual wellness visit, subsequent         Asymptomatic postmenopausal state    Orders:  •  DXA bone density spine hip and pelvis; Future    Benign essential hypertension         Anxiety state            Patient Instructions   Medicare wellness exam is completed.  Repeat DEXA scan.  Alendronate was started when she was on prednisone.  Never had a DEXA that showed osteoporosis and if DEXA scan is okay, alendronate could be stopped.  Continue blood pressure medications.  Dose of amlodipine is 5 alternating every other day with 2.5.  Plus metoprolol.  Review of lipid profile which was okay not needing alendronate.  She is using a supplement which probably will not make a lot of difference.  Prescription also given for mammogram.  Recheck in 6 months.    Medicare Preventive Visit Patient Instructions  Thank you for completing your Welcome to Medicare Visit or Medicare Annual Wellness Visit today. Your next wellness visit will be due in one year (2026).  The screening/preventive services that you may require over the next 5-10 years are detailed below. Some tests may not apply to you based off risk factors and/or age. Screening tests ordered at today's visit but not completed yet may show as past due. Also, please note that scanned in results may not display below.  Preventive Screenings:  Service Recommendations Previous Testing/Comments   Colorectal Cancer Screening  * Colonoscopy    * Fecal Occult Blood Test (FOBT)/Fecal Immunochemical Test (FIT)  * Fecal DNA/Cologuard Test  * Flexible Sigmoidoscopy Age: 45-75 years old   Colonoscopy: every 10 years (may be performed more frequently if at  higher risk)  OR  FOBT/FIT: every 1 year  OR  Cologuard: every 3 years  OR  Sigmoidoscopy: every 5 years  Screening may be recommended earlier than age 45 if at higher risk for colorectal cancer. Also, an individualized decision between you and your healthcare provider will decide whether screening between the ages of 76-85 would be appropriate. Colonoscopy: 01/04/2023  FOBT/FIT: Not on file  Cologuard: 11/14/2022  Sigmoidoscopy: Not on file    Screening Current     Breast Cancer Screening Age: 40+ years old  Frequency: every 1-2 years  Not required if history of left and right mastectomy Mammogram: 07/09/2021        Cervical Cancer Screening Between the ages of 21-29, pap smear recommended once every 3 years.   Between the ages of 30-65, can perform pap smear with HPV co-testing every 5 years.   Recommendations may differ for women with a history of total hysterectomy, cervical cancer, or abnormal pap smears in past. Pap Smear: Not on file    Screening Not Indicated   Hepatitis C Screening Once for adults born between 1945 and 1965  More frequently in patients at high risk for Hepatitis C Hep C Antibody: 10/28/2019    Screening Current   Diabetes Screening 1-2 times per year if you're at risk for diabetes or have pre-diabetes Fasting glucose: 91 mg/dL (4/24/2025)  A1C: No results in last 5 years (No results in last 5 years)  Screening Current   Cholesterol Screening Once every 5 years if you don't have a lipid disorder. May order more often based on risk factors. Lipid panel: 04/24/2025    Screening Current     Other Preventive Screenings Covered by Medicare:  Abdominal Aortic Aneurysm (AAA) Screening: covered once if your at risk. You're considered to be at risk if you have a family history of AAA.  Lung Cancer Screening: covers low dose CT scan once per year if you meet all of the following conditions: (1) Age 55-77; (2) No signs or symptoms of lung cancer; (3) Current smoker or have quit smoking within the last  15 years; (4) You have a tobacco smoking history of at least 20 pack years (packs per day multiplied by number of years you smoked); (5) You get a written order from a healthcare provider.  Glaucoma Screening: covered annually if you're considered high risk: (1) You have diabetes OR (2) Family history of glaucoma OR (3)  aged 50 and older OR (4)  American aged 65 and older  Osteoporosis Screening: covered every 2 years if you meet one of the following conditions: (1) You're estrogen deficient and at risk for osteoporosis based off medical history and other findings; (2) Have a vertebral abnormality; (3) On glucocorticoid therapy for more than 3 months; (4) Have primary hyperparathyroidism; (5) On osteoporosis medications and need to assess response to drug therapy.   Last bone density test (DXA Scan): 09/17/2020.  HIV Screening: covered annually if you're between the age of 15-65. Also covered annually if you are younger than 15 and older than 65 with risk factors for HIV infection. For pregnant patients, it is covered up to 3 times per pregnancy.    Immunizations:  Immunization Recommendations   Influenza Vaccine Annual influenza vaccination during flu season is recommended for all persons aged >= 6 months who do not have contraindications   Pneumococcal Vaccine   * Pneumococcal conjugate vaccine = PCV13 (Prevnar 13), PCV15 (Vaxneuvance), PCV20 (Prevnar 20)  * Pneumococcal polysaccharide vaccine = PPSV23 (Pneumovax) Adults 19-63 yo with certain risk factors or if 65+ yo  If never received any pneumonia vaccine: recommend Prevnar 20 (PCV20)  Give PCV20 if previously received 1 dose of PCV13 or PPSV23   Hepatitis B Vaccine 3 dose series if at intermediate or high risk (ex: diabetes, end stage renal disease, liver disease)   Respiratory syncytial virus (RSV) Vaccine - COVERED BY MEDICARE PART D  * RSVPreF3 (Arexvy) CDC recommends that adults 60 years of age and older may receive a single dose  of RSV vaccine using shared clinical decision-making (SCDM)   Tetanus (Td) Vaccine - COST NOT COVERED BY MEDICARE PART B Following completion of primary series, a booster dose should be given every 10 years to maintain immunity against tetanus. Td may also be given as tetanus wound prophylaxis.   Tdap Vaccine - COST NOT COVERED BY MEDICARE PART B Recommended at least once for all adults. For pregnant patients, recommended with each pregnancy.   Shingles Vaccine (Shingrix) - COST NOT COVERED BY MEDICARE PART B  2 shot series recommended in those 19 years and older who have or will have weakened immune systems or those 50 years and older     Health Maintenance Due:      Topic Date Due   • Breast Cancer Screening: Mammogram  07/09/2022   • Colorectal Cancer Screening  01/03/2026   • Hepatitis C Screening  Completed     Immunizations Due:      Topic Date Due   • COVID-19 Vaccine (5 - 2024-25 season) 06/26/2025     Advance Directives   What are advance directives?  Advance directives are legal documents that state your wishes and plans for medical care. These plans are made ahead of time in case you lose your ability to make decisions for yourself. Advance directives can apply to any medical decision, such as the treatments you want, and if you want to donate organs.   What are the types of advance directives?  There are many types of advance directives, and each state has rules about how to use them. You may choose a combination of any of the following:  Living will:  This is a written record of the treatment you want. You can also choose which treatments you do not want, which to limit, and which to stop at a certain time. This includes surgery, medicine, IV fluid, and tube feedings.   Durable power of  for healthcare (DPAHC):  This is a written record that states who you want to make healthcare choices for you when you are unable to make them for yourself. This person, called a proxy, is usually a family  member or a friend. You may choose more than 1 proxy.  Do not resuscitate (DNR) order:  A DNR order is used in case your heart stops beating or you stop breathing. It is a request not to have certain forms of treatment, such as CPR. A DNR order may be included in other types of advance directives.  Medical directive:  This covers the care that you want if you are in a coma, near death, or unable to make decisions for yourself. You can list the treatments you want for each condition. Treatment may include pain medicine, surgery, blood transfusions, dialysis, IV or tube feedings, and a ventilator (breathing machine).  Values history:  This document has questions about your views, beliefs, and how you feel and think about life. This information can help others choose the care that you would choose.  Why are advance directives important?  An advance directive helps you control your care. Although spoken wishes may be used, it is better to have your wishes written down. Spoken wishes can be misunderstood, or not followed. Treatments may be given even if you do not want them. An advance directive may make it easier for your family to make difficult choices about your care.   Urinary Incontinence   Urinary incontinence (UI)  is when you lose control of your bladder. UI develops because your bladder cannot store or empty urine properly. The 3 most common types of UI are stress incontinence, urge incontinence, or both.  Medicines:   May be given to help strengthen your bladder control. Report any side effects of medication to your healthcare provider.  Do pelvic muscle exercises often:  Your pelvic muscles help you stop urinating. Squeeze these muscles tight for 5 seconds, then relax for 5 seconds. Gradually work up to squeezing for 10 seconds. Do 3 sets of 15 repetitions a day, or as directed. This will help strengthen your pelvic muscles and improve bladder control.  Train your bladder:  Go to the bathroom at set times,  such as every 2 hours, even if you do not feel the urge to go. You can also try to hold your urine when you feel the urge to go. For example, hold your urine for 5 minutes when you feel the urge to go. As that becomes easier, hold your urine for 10 minutes.   Self-care:   Keep a UI record.  Write down how often you leak urine and how much you leak. Make a note of what you were doing when you leaked urine.  Drink liquids as directed. You may need to limit the amount of liquid you drink to help control your urine leakage. Do not drink any liquid right before you go to bed. Limit or do not have drinks that contain caffeine or alcohol.   Prevent constipation.  Eat a variety of high-fiber foods. Good examples are high-fiber cereals, beans, vegetables, and whole-grain breads. Walking is the best way to trigger your intestines to have a bowel movement.  Exercise regularly and maintain a healthy weight.  Weight loss and exercise will decrease pressure on your bladder and help you control your leakage.   Use a catheter as directed  to help empty your bladder. A catheter is a tiny, plastic tube that is put into your bladder to drain your urine.   Go to behavior therapy as directed.  Behavior therapy may be used to help you learn to control your urge to urinate.    Weight Management   Why it is important to manage your weight:  Being overweight increases your risk of health conditions such as heart disease, high blood pressure, type 2 diabetes, and certain types of cancer. It can also increase your risk for osteoarthritis, sleep apnea, and other respiratory problems. Aim for a slow, steady weight loss. Even a small amount of weight loss can lower your risk of health problems.  How to lose weight safely:  A safe and healthy way to lose weight is to eat fewer calories and get regular exercise. You can lose up about 1 pound a week by decreasing the number of calories you eat by 500 calories each day.   Healthy meal plan for  weight management:  A healthy meal plan includes a variety of foods, contains fewer calories, and helps you stay healthy. A healthy meal plan includes the following:  Eat whole-grain foods more often.  A healthy meal plan should contain fiber. Fiber is the part of grains, fruits, and vegetables that is not broken down by your body. Whole-grain foods are healthy and provide extra fiber in your diet. Some examples of whole-grain foods are whole-wheat breads and pastas, oatmeal, brown rice, and bulgur.  Eat a variety of vegetables every day.  Include dark, leafy greens such as spinach, kale, shukri greens, and mustard greens. Eat yellow and orange vegetables such as carrots, sweet potatoes, and winter squash.   Eat a variety of fruits every day.  Choose fresh or canned fruit (canned in its own juice or light syrup) instead of juice. Fruit juice has very little or no fiber.  Eat low-fat dairy foods.  Drink fat-free (skim) milk or 1% milk. Eat fat-free yogurt and low-fat cottage cheese. Try low-fat cheeses such as mozzarella and other reduced-fat cheeses.  Choose meat and other protein foods that are low in fat.  Choose beans or other legumes such as split peas or lentils. Choose fish, skinless poultry (chicken or turkey), or lean cuts of red meat (beef or pork). Before you cook meat or poultry, cut off any visible fat.   Use less fat and oil.  Try baking foods instead of frying them. Add less fat, such as margarine, sour cream, regular salad dressing and mayonnaise to foods. Eat fewer high-fat foods. Some examples of high-fat foods include french fries, doughnuts, ice cream, and cakes.  Eat fewer sweets.  Limit foods and drinks that are high in sugar. This includes candy, cookies, regular soda, and sweetened drinks.  Exercise:  Exercise at least 30 minutes per day on most days of the week. Some examples of exercise include walking, biking, dancing, and swimming. You can also fit in more physical activity by taking the  stairs instead of the elevator or parking farther away from stores. Ask your healthcare provider about the best exercise plan for you.    © Copyright [employer] 2018 Information is for End User's use only and may not be sold, redistributed or otherwise used for commercial purposes. All illustrations and images included in CareNotes® are the copyrighted property of [employer] or [employer]      Medicare Preventive Visit Patient Instructions  Thank you for completing your Welcome to Medicare Visit or Medicare Annual Wellness Visit today. Your next wellness visit will be due in one year (6/24/2026).  The screening/preventive services that you may require over the next 5-10 years are detailed below. Some tests may not apply to you based off risk factors and/or age. Screening tests ordered at today's visit but not completed yet may show as past due. Also, please note that scanned in results may not display below.  Preventive Screenings:  Service Recommendations Previous Testing/Comments   Colorectal Cancer Screening  * Colonoscopy    * Fecal Occult Blood Test (FOBT)/Fecal Immunochemical Test (FIT)  * Fecal DNA/Cologuard Test  * Flexible Sigmoidoscopy Age: 45-75 years old   Colonoscopy: every 10 years (may be performed more frequently if at higher risk)  OR  FOBT/FIT: every 1 year  OR  Cologuard: every 3 years  OR  Sigmoidoscopy: every 5 years  Screening may be recommended earlier than age 45 if at higher risk for colorectal cancer. Also, an individualized decision between you and your healthcare provider will decide whether screening between the ages of 76-85 would be appropriate. Colonoscopy: 01/04/2023  FOBT/FIT: Not on file  Cologuard: 11/14/2022  Sigmoidoscopy: Not on file    Screening Current     Breast Cancer Screening Age: 40+ years old  Frequency: every 1-2 years  Not required if history of left and right mastectomy Mammogram: 07/09/2021        Cervical Cancer Screening Between the ages of 21-29,  pap smear recommended once every 3 years.   Between the ages of 30-65, can perform pap smear with HPV co-testing every 5 years.   Recommendations may differ for women with a history of total hysterectomy, cervical cancer, or abnormal pap smears in past. Pap Smear: Not on file    Screening Not Indicated   Hepatitis C Screening Once for adults born between 1945 and 1965  More frequently in patients at high risk for Hepatitis C Hep C Antibody: 10/28/2019    Screening Current   Diabetes Screening 1-2 times per year if you're at risk for diabetes or have pre-diabetes Fasting glucose: 91 mg/dL (4/24/2025)  A1C: No results in last 5 years (No results in last 5 years)  Screening Current   Cholesterol Screening Once every 5 years if you don't have a lipid disorder. May order more often based on risk factors. Lipid panel: 04/24/2025    Screening Current     Other Preventive Screenings Covered by Medicare:  Abdominal Aortic Aneurysm (AAA) Screening: covered once if your at risk. You're considered to be at risk if you have a family history of AAA.  Lung Cancer Screening: covers low dose CT scan once per year if you meet all of the following conditions: (1) Age 55-77; (2) No signs or symptoms of lung cancer; (3) Current smoker or have quit smoking within the last 15 years; (4) You have a tobacco smoking history of at least 20 pack years (packs per day multiplied by number of years you smoked); (5) You get a written order from a healthcare provider.  Glaucoma Screening: covered annually if you're considered high risk: (1) You have diabetes OR (2) Family history of glaucoma OR (3)  aged 50 and older OR (4)  American aged 65 and older  Osteoporosis Screening: covered every 2 years if you meet one of the following conditions: (1) You're estrogen deficient and at risk for osteoporosis based off medical history and other findings; (2) Have a vertebral abnormality; (3) On glucocorticoid therapy for more than 3  months; (4) Have primary hyperparathyroidism; (5) On osteoporosis medications and need to assess response to drug therapy.   Last bone density test (DXA Scan): 09/17/2020.  HIV Screening: covered annually if you're between the age of 15-65. Also covered annually if you are younger than 15 and older than 65 with risk factors for HIV infection. For pregnant patients, it is covered up to 3 times per pregnancy.    Immunizations:  Immunization Recommendations   Influenza Vaccine Annual influenza vaccination during flu season is recommended for all persons aged >= 6 months who do not have contraindications   Pneumococcal Vaccine   * Pneumococcal conjugate vaccine = PCV13 (Prevnar 13), PCV15 (Vaxneuvance), PCV20 (Prevnar 20)  * Pneumococcal polysaccharide vaccine = PPSV23 (Pneumovax) Adults 19-65 yo with certain risk factors or if 65+ yo  If never received any pneumonia vaccine: recommend Prevnar 20 (PCV20)  Give PCV20 if previously received 1 dose of PCV13 or PPSV23   Hepatitis B Vaccine 3 dose series if at intermediate or high risk (ex: diabetes, end stage renal disease, liver disease)   Respiratory syncytial virus (RSV) Vaccine - COVERED BY MEDICARE PART D  * RSVPreF3 (Arexvy) CDC recommends that adults 60 years of age and older may receive a single dose of RSV vaccine using shared clinical decision-making (SCDM)   Tetanus (Td) Vaccine - COST NOT COVERED BY MEDICARE PART B Following completion of primary series, a booster dose should be given every 10 years to maintain immunity against tetanus. Td may also be given as tetanus wound prophylaxis.   Tdap Vaccine - COST NOT COVERED BY MEDICARE PART B Recommended at least once for all adults. For pregnant patients, recommended with each pregnancy.   Shingles Vaccine (Shingrix) - COST NOT COVERED BY MEDICARE PART B  2 shot series recommended in those 19 years and older who have or will have weakened immune systems or those 50 years and older     Health Maintenance Due:       Topic Date Due   • Breast Cancer Screening: Mammogram  07/09/2022   • Colorectal Cancer Screening  01/03/2026   • Hepatitis C Screening  Completed     Immunizations Due:      Topic Date Due   • COVID-19 Vaccine (5 - 2024-25 season) 06/26/2025     Advance Directives   What are advance directives?  Advance directives are legal documents that state your wishes and plans for medical care. These plans are made ahead of time in case you lose your ability to make decisions for yourself. Advance directives can apply to any medical decision, such as the treatments you want, and if you want to donate organs.   What are the types of advance directives?  There are many types of advance directives, and each state has rules about how to use them. You may choose a combination of any of the following:  Living will:  This is a written record of the treatment you want. You can also choose which treatments you do not want, which to limit, and which to stop at a certain time. This includes surgery, medicine, IV fluid, and tube feedings.   Durable power of  for healthcare (DPAHC):  This is a written record that states who you want to make healthcare choices for you when you are unable to make them for yourself. This person, called a proxy, is usually a family member or a friend. You may choose more than 1 proxy.  Do not resuscitate (DNR) order:  A DNR order is used in case your heart stops beating or you stop breathing. It is a request not to have certain forms of treatment, such as CPR. A DNR order may be included in other types of advance directives.  Medical directive:  This covers the care that you want if you are in a coma, near death, or unable to make decisions for yourself. You can list the treatments you want for each condition. Treatment may include pain medicine, surgery, blood transfusions, dialysis, IV or tube feedings, and a ventilator (breathing machine).  Values history:  This document has questions about your  views, beliefs, and how you feel and think about life. This information can help others choose the care that you would choose.  Why are advance directives important?  An advance directive helps you control your care. Although spoken wishes may be used, it is better to have your wishes written down. Spoken wishes can be misunderstood, or not followed. Treatments may be given even if you do not want them. An advance directive may make it easier for your family to make difficult choices about your care.   Urinary Incontinence   Urinary incontinence (UI)  is when you lose control of your bladder. UI develops because your bladder cannot store or empty urine properly. The 3 most common types of UI are stress incontinence, urge incontinence, or both.  Medicines:   May be given to help strengthen your bladder control. Report any side effects of medication to your healthcare provider.  Do pelvic muscle exercises often:  Your pelvic muscles help you stop urinating. Squeeze these muscles tight for 5 seconds, then relax for 5 seconds. Gradually work up to squeezing for 10 seconds. Do 3 sets of 15 repetitions a day, or as directed. This will help strengthen your pelvic muscles and improve bladder control.  Train your bladder:  Go to the bathroom at set times, such as every 2 hours, even if you do not feel the urge to go. You can also try to hold your urine when you feel the urge to go. For example, hold your urine for 5 minutes when you feel the urge to go. As that becomes easier, hold your urine for 10 minutes.   Self-care:   Keep a UI record.  Write down how often you leak urine and how much you leak. Make a note of what you were doing when you leaked urine.  Drink liquids as directed. You may need to limit the amount of liquid you drink to help control your urine leakage. Do not drink any liquid right before you go to bed. Limit or do not have drinks that contain caffeine or alcohol.   Prevent constipation.  Eat a variety of  high-fiber foods. Good examples are high-fiber cereals, beans, vegetables, and whole-grain breads. Walking is the best way to trigger your intestines to have a bowel movement.  Exercise regularly and maintain a healthy weight.  Weight loss and exercise will decrease pressure on your bladder and help you control your leakage.   Use a catheter as directed  to help empty your bladder. A catheter is a tiny, plastic tube that is put into your bladder to drain your urine.   Go to behavior therapy as directed.  Behavior therapy may be used to help you learn to control your urge to urinate.    Weight Management   Why it is important to manage your weight:  Being overweight increases your risk of health conditions such as heart disease, high blood pressure, type 2 diabetes, and certain types of cancer. It can also increase your risk for osteoarthritis, sleep apnea, and other respiratory problems. Aim for a slow, steady weight loss. Even a small amount of weight loss can lower your risk of health problems.  How to lose weight safely:  A safe and healthy way to lose weight is to eat fewer calories and get regular exercise. You can lose up about 1 pound a week by decreasing the number of calories you eat by 500 calories each day.   Healthy meal plan for weight management:  A healthy meal plan includes a variety of foods, contains fewer calories, and helps you stay healthy. A healthy meal plan includes the following:  Eat whole-grain foods more often.  A healthy meal plan should contain fiber. Fiber is the part of grains, fruits, and vegetables that is not broken down by your body. Whole-grain foods are healthy and provide extra fiber in your diet. Some examples of whole-grain foods are whole-wheat breads and pastas, oatmeal, brown rice, and bulgur.  Eat a variety of vegetables every day.  Include dark, leafy greens such as spinach, kale, shukri greens, and mustard greens. Eat yellow and orange vegetables such as carrots, sweet  potatoes, and winter squash.   Eat a variety of fruits every day.  Choose fresh or canned fruit (canned in its own juice or light syrup) instead of juice. Fruit juice has very little or no fiber.  Eat low-fat dairy foods.  Drink fat-free (skim) milk or 1% milk. Eat fat-free yogurt and low-fat cottage cheese. Try low-fat cheeses such as mozzarella and other reduced-fat cheeses.  Choose meat and other protein foods that are low in fat.  Choose beans or other legumes such as split peas or lentils. Choose fish, skinless poultry (chicken or turkey), or lean cuts of red meat (beef or pork). Before you cook meat or poultry, cut off any visible fat.   Use less fat and oil.  Try baking foods instead of frying them. Add less fat, such as margarine, sour cream, regular salad dressing and mayonnaise to foods. Eat fewer high-fat foods. Some examples of high-fat foods include french fries, doughnuts, ice cream, and cakes.  Eat fewer sweets.  Limit foods and drinks that are high in sugar. This includes candy, cookies, regular soda, and sweetened drinks.  Exercise:  Exercise at least 30 minutes per day on most days of the week. Some examples of exercise include walking, biking, dancing, and swimming. You can also fit in more physical activity by taking the stairs instead of the elevator or parking farther away from stores. Ask your healthcare provider about the best exercise plan for you.    © Copyright [employer] 2018 Information is for End User's use only and may not be sold, redistributed or otherwise used for commercial purposes. All illustrations and images included in CareNotes® are the copyrighted property of [employer] or [employer] [facility]          History of Present Illness     HPI  Here for follow-up of hypertension, hyponatremia, temporal arteritis, knee DJD,and depression.    Mood is fine.  Continues on 50 mg of sertraline.  Stop binge drinking.  Can do an occasional alcoholic beverage.  Has not needed  "Symbicort for a long time.  Dose of amlodipine is 1 tablet alternating every other day with 1/2 tablet.  Continues on alendronate which was started when she was placed on prednisone for temporal arteritis.  Has not had a bone density exam for 5 years.    Review of Systems    Past Medical History[1]  Past Surgical History[2]  Social History     Social History Narrative     since 1981.      Two sons.      Retired in 2018.  Was a .     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     Enjoys reading, gardening, walking, needle work, painting, writing books.    9/23-Richard living in [place].      Jose Juan  living in [place].Got   August 10, 2024.     Medications[3]  No Known Allergies  Immunization History   Administered Date(s) Administered   • COVID-19 MODERNA VACC 0.5 ML IM 01/25/2021, 02/22/2021, 02/25/2022   • COVID-19 Pfizer mRNA vacc PF kya-sucrose 12 yr and older (Comirnaty) 12/26/2024   • INFLUENZA 10/03/2016, 10/31/2016, 10/06/2017, 10/15/2018, 11/01/2019, 10/02/2020, 11/15/2021, 10/24/2022, 09/25/2023   • Influenza Split High Dose Preservative Free IM 10/15/2018, 12/26/2024   • Influenza, high dose seasonal 0.7 mL 11/01/2019, 10/02/2020, 11/15/2021, 10/24/2022, 09/25/2023   • Pneumococcal Conjugate 13-Valent 04/10/2018   • Pneumococcal Polysaccharide PPV23 10/06/2017, 11/01/2019   • Tdap 04/28/2012, 10/15/2018   • Zoster 12/02/2013     Objective   /82 (BP Location: Left arm, Patient Position: Sitting, Cuff Size: Large)   Pulse 56   Temp 98 °F (36.7 °C) (Tympanic)   Resp 16   Ht 5' 3\" (1.6 m)   Wt 76.5 kg (168 lb 9.6 oz)   SpO2 96%   BMI 29.87 kg/m²     Physical Exam  Appears well.  Lungs are clear.  Heart regular.  Abdomen soft and nontender.  Mood is upbeat.  Affect appropriate.           [1]  Past Medical History:  Diagnosis Date   • Age-related osteoporosis without current pathological fracture 02/14/2022   • Anxiety " state 12/19/2014   • Arthritis    • Asthma    • Benign essential hypertension 01/14/2010   • Chronic rhinitis 01/12/2012   • Depression 2019   • Diverticulitis 01/07/2023    Uncomplicated-treated with Augmentin   • 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 hair loss 02/04/2020   • Fracture of multiple ribs of left side 12/21/2020   • Hypertension    • Hyponatremia 10/02/2020   • Mild persistent asthma without complication 09/07/2017   • Personal history of colonic polyps 01/04/2023    Multiple polyps removed 1/4/2023.  Recall 3 years.   • Right chronic serous otitis media 11/07/2017    Resolved after myringotomy tube   • Scapula fracture 12/21/2020   • Temporal arteritis (HCC) 09/04/2020    Biopsy negative-treated with prednisone for 1 year   [2]  Past Surgical History:  Procedure Laterality Date   • COLONOSCOPY     • LAPAROSCOPY      X2 for endometriosis   • NE LIGATION/BIOPSY TEMPORAL ARTERY Left 08/14/2020    Procedure: TEMPORAL ARTERY BIOPSY;  Surgeon: Yosvany Hadry MD;  Location:  MAIN OR;  Service: General   • TOTAL KNEE ARTHROPLASTY Right 03/28/2022    Dr. Lake   [3]  Current Outpatient Medications on File Prior to Visit   Medication Sig   • albuterol (2.5 mg/3 mL) 0.083 % nebulizer solution Take 3 mL (2.5 mg total) by nebulization every 6 (six) hours as needed for wheezing or shortness of breath   • albuterol (ProAir HFA) 90 mcg/act inhaler Inhale 2 puffs every 6 (six) hours as needed for wheezing   • alendronate (FOSAMAX) 70 mg tablet TAKE 1 TAB WEEKLY ON EMPTY STOMACH IN THE UPRIGHT POSITION   • Aluminum & Magnesium Hydroxide (MAGNESIUM-ALUMINUM PO) Take by mouth   • amLODIPine (NORVASC) 5 mg tablet 2 tablets daily or as directed   • b complex vitamins capsule Take 1 capsule by mouth in the morning.   • budesonide-formoterol (Symbicort) 80-4.5 MCG/ACT inhaler Inhale 2 puffs 2 (two) times a day Rinse mouth after use.   • CITRUS BERGAMOT PO Take by mouth   • loratadine (CLARITIN) 10 mg tablet Take 1 tablet (10 mg total) by mouth  daily   • metoprolol succinate (TOPROL-XL) 100 mg 24 hr tablet TAKE 1 TABLET BY MOUTH EVERY DAY   • olmesartan (BENICAR) 20 mg tablet TAKE 1 TABLET BY MOUTH EVERY DAY   • Omega-3 1000 MG CAPS Take by mouth   • sertraline (ZOLOFT) 50 mg tablet Take 1 tablet (50 mg total) by mouth daily   • spironolactone (ALDACTONE) 50 mg tablet Take 1 tablet (50 mg total) by mouth in the morning

## 2025-07-21 ENCOUNTER — HOSPITAL ENCOUNTER (OUTPATIENT)
Dept: RADIOLOGY | Facility: IMAGING CENTER | Age: 70
Discharge: HOME | End: 2025-07-21
Payer: MEDICARE

## 2025-07-21 VITALS — BODY MASS INDEX: 28.68 KG/M2 | HEIGHT: 64 IN | WEIGHT: 168 LBS

## 2025-07-21 DIAGNOSIS — Z12.31 ENCOUNTER FOR SCREENING MAMMOGRAM FOR BREAST CANCER: ICD-10-CM

## 2025-07-21 PROCEDURE — 77063 BREAST TOMOSYNTHESIS BI: CPT

## 2025-07-21 PROCEDURE — 77067 SCR MAMMO BI INCL CAD: CPT

## 2025-07-22 ENCOUNTER — OFFICE VISIT (OUTPATIENT)
Dept: URGENT CARE | Age: 70
End: 2025-07-22
Payer: MEDICARE

## 2025-07-22 VITALS
DIASTOLIC BLOOD PRESSURE: 71 MMHG | OXYGEN SATURATION: 96 % | HEART RATE: 56 BPM | SYSTOLIC BLOOD PRESSURE: 157 MMHG | RESPIRATION RATE: 18 BRPM

## 2025-07-22 DIAGNOSIS — H69.91 DISORDER OF RIGHT EUSTACHIAN TUBE: ICD-10-CM

## 2025-07-22 DIAGNOSIS — H10.9 BACTERIAL CONJUNCTIVITIS OF LEFT EYE: ICD-10-CM

## 2025-07-22 DIAGNOSIS — J06.9 ACUTE URI: Primary | ICD-10-CM

## 2025-07-22 PROCEDURE — G0463 HOSPITAL OUTPT CLINIC VISIT: HCPCS

## 2025-07-22 PROCEDURE — 99213 OFFICE O/P EST LOW 20 MIN: CPT

## 2025-07-22 RX ORDER — OFLOXACIN 3 MG/ML
1 SOLUTION/ DROPS OPHTHALMIC 4 TIMES DAILY
Qty: 5 ML | Refills: 0 | Status: SHIPPED | OUTPATIENT
Start: 2025-07-22 | End: 2025-07-29

## 2025-07-22 RX ORDER — FLUTICASONE PROPIONATE 50 MCG
1 SPRAY, SUSPENSION (ML) NASAL DAILY
Qty: 9.9 ML | Refills: 0 | Status: SHIPPED | OUTPATIENT
Start: 2025-07-22

## 2025-07-22 NOTE — PROGRESS NOTES
"  [facility]        NAME: Renetta Velasquez is a 70 y.o. female  : 1955    MRN: [id number]  DATE: 2025  TIME: 9:26 AM    Assessment and Plan   Acute URI [J06.9]  1. Acute URI  fluticasone (FLONASE) 50 mcg/act nasal spray      2. Disorder of right eustachian tube  fluticasone (FLONASE) 50 mcg/act nasal spray      3. Bacterial conjunctivitis of left eye  ofloxacin (OCUFLOX) 0.3 % ophthalmic solution            Patient Instructions       Follow up with PCP in 3-5 days.  Proceed to  ER if symptoms worsen.    If tests have been performed at [facility], our office will contact you with results if changes need to be made to the care plan discussed with you at the visit.  You can review your full results on [facility] MyChart.    Chief Complaint     Chief Complaint   Patient presents with    Ear Fullness     R ear fullness \" feels like I have to pop it\"         History of Present Illness       69 y/o F presents for R ear fullness x yesterday  URI like symptoms at onset, 4 days ago  L eye erythema/crusting this AM  Using dayquil/nyquil PRN  Denies sick contacts.   Wears contact lens          Ear Fullness   Associated symptoms include coughing and rhinorrhea. Pertinent negatives include no sore throat.       Review of Systems   Review of Systems   Constitutional:  Positive for chills. Negative for fever.   HENT:  Positive for congestion, postnasal drip and rhinorrhea. Negative for ear pain and sore throat.    Respiratory:  Positive for cough.          Current Medications     Current Medications[1]    Current Allergies     Allergies as of 2025    (No Known Allergies)            The following portions of the patient's history were reviewed and updated as appropriate: allergies, current medications, past family history, past medical history, past social history, past surgical history and problem list.     Past Medical History[2]    Past Surgical History[3]    Family History[4]      Medications have " been verified.        Objective   /71   Pulse 56   Resp 18   SpO2 96%   No LMP recorded. Patient is postmenopausal.       Physical Exam     Physical Exam  Vitals and nursing note reviewed.   Constitutional:       General: She is not in acute distress.     Appearance: She is not toxic-appearing.   HENT:      Head: Normocephalic and atraumatic.      Right Ear: External ear normal.      Left Ear: Tympanic membrane, ear canal and external ear normal.      Nose: Rhinorrhea present.      Mouth/Throat:      Mouth: Mucous membranes are moist.     Eyes:      General: Lids are normal.         Left eye: Discharge present.     Extraocular Movements: Extraocular movements intact.      Right eye: Normal extraocular motion and no nystagmus.      Left eye: Normal extraocular motion and no nystagmus.      Conjunctiva/sclera:      Left eye: Left conjunctiva is injected.      Pupils: Pupils are equal, round, and reactive to light.       Cardiovascular:      Rate and Rhythm: Normal rate and regular rhythm.   Pulmonary:      Effort: Pulmonary effort is normal.      Breath sounds: Normal breath sounds.     Neurological:      Mental Status: She is alert.      Coordination: Coordination normal.      Gait: Gait normal.     Psychiatric:         Mood and Affect: Mood normal.         Behavior: Behavior normal.                        [1]   Current Outpatient Medications:     fluticasone (FLONASE) 50 mcg/act nasal spray, 1 spray into each nostril daily, Disp: 9.9 mL, Rfl: 0    ofloxacin (OCUFLOX) 0.3 % ophthalmic solution, Administer 1 drop into the left eye 4 (four) times a day for 7 days, Disp: 5 mL, Rfl: 0    albuterol (2.5 mg/3 mL) 0.083 % nebulizer solution, Take 3 mL (2.5 mg total) by nebulization every 6 (six) hours as needed for wheezing or shortness of breath, Disp: 180 mL, Rfl: 0    albuterol (ProAir HFA) 90 mcg/act inhaler, Inhale 2 puffs every 6 (six) hours as needed for wheezing, Disp: 8.5 g, Rfl: 5    alendronate (FOSAMAX)  70 mg tablet, TAKE 1 TAB WEEKLY ON EMPTY STOMACH IN THE UPRIGHT POSITION, Disp: 12 tablet, Rfl: 3    Aluminum & Magnesium Hydroxide (MAGNESIUM-ALUMINUM PO), Take by mouth, Disp: , Rfl:     amLODIPine (NORVASC) 5 mg tablet, 2 tablets daily or as directed, Disp: 180 tablet, Rfl: 3    b complex vitamins capsule, Take 1 capsule by mouth in the morning., Disp: , Rfl:     budesonide-formoterol (Symbicort) 80-4.5 MCG/ACT inhaler, Inhale 2 puffs 2 (two) times a day Rinse mouth after use., Disp: 10.2 g, Rfl: 5    CITRUS BERGAMOT PO, Take by mouth, Disp: , Rfl:     loratadine (CLARITIN) 10 mg tablet, Take 1 tablet (10 mg total) by mouth daily, Disp: 30 tablet, Rfl: 0    metoprolol succinate (TOPROL-XL) 100 mg 24 hr tablet, TAKE 1 TABLET BY MOUTH EVERY DAY, Disp: 90 tablet, Rfl: 1    olmesartan (BENICAR) 20 mg tablet, TAKE 1 TABLET BY MOUTH EVERY DAY, Disp: 90 tablet, Rfl: 3    Omega-3 1000 MG CAPS, Take by mouth, Disp: , Rfl:     sertraline (ZOLOFT) 50 mg tablet, Take 1 tablet (50 mg total) by mouth daily, Disp: 90 tablet, Rfl: 3    spironolactone (ALDACTONE) 50 mg tablet, Take 1 tablet (50 mg total) by mouth in the morning, Disp: 90 tablet, Rfl: 3  [2]   Past Medical History:  Diagnosis Date    Age-related osteoporosis without current pathological fracture 02/14/2022    Anxiety state 12/19/2014    Arthritis     Asthma     Benign essential hypertension 01/14/2010    Chronic rhinitis 01/12/2012    Depression 2019    Diverticulitis 01/07/2023    Uncomplicated-treated with Augmentin    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 hair loss 02/04/2020    Fracture of multiple ribs of left side 12/21/2020    Hypertension     Hyponatremia 10/02/2020    Mild persistent asthma without complication 09/07/2017    Personal history of colonic polyps 01/04/2023    Multiple polyps removed 1/4/2023.  Recall 3 years.    Right chronic serous otitis media 11/07/2017    Resolved after myringotomy tube    Scapula fracture 12/21/2020    Temporal arteritis (HCC) 09/04/2020     Biopsy negative-treated with prednisone for 1 year   [3]   Past Surgical History:  Procedure Laterality Date    COLONOSCOPY      LAPAROSCOPY      X2 for endometriosis    ME LIGATION/BIOPSY TEMPORAL ARTERY Left 08/14/2020    Procedure: TEMPORAL ARTERY BIOPSY;  Surgeon: Yosvany Hardy MD;  Location:  MAIN OR;  Service: General    TOTAL KNEE ARTHROPLASTY Right 03/28/2022    Dr. Lake   [4]   Family History  Problem Relation Name Age of Onset    Coronary artery disease Mother Magaly     Stroke Mother Magaly     Coronary artery disease Father Elwyn     Alcohol abuse Father Elwyn     Heart disease Father Elwyn     No Known Problems Maternal Grandmother      No Known Problems Maternal Grandfather      Leukemia Paternal Grandmother  69    No Known Problems Paternal Grandfather      Substance Abuse Son Edwin     No Known Problems Sean Ramos

## 2025-08-04 ENCOUNTER — TELEPHONE (OUTPATIENT)
Age: 70
End: 2025-08-04

## (undated) DEVICE — INTENDED FOR TISSUE SEPARATION, AND OTHER PROCEDURES THAT REQUIRE A SHARP SURGICAL BLADE TO PUNCTURE OR CUT.: Brand: BARD-PARKER SAFETY BLADES SIZE 15, STERILE

## (undated) DEVICE — SYRINGE 10ML LL CONTROL TOP

## (undated) DEVICE — SPONGE 4 X 4 XRAY 16 PLY STRL LF RFD

## (undated) DEVICE — 3M™ STERI-STRIP™ REINFORCED ADHESIVE SKIN CLOSURES, R1547, 1/2 IN X 4 IN (12 MM X 100 MM), 6 STRIPS/ENVELOPE: Brand: 3M™ STERI-STRIP™

## (undated) DEVICE — TELFA NON-ADHERENT ABSORBENT DRESSING: Brand: TELFA

## (undated) DEVICE — MINOR PROCEDURE DRAPE: Brand: CONVERTORS

## (undated) DEVICE — STERILE POLYISOPRENE POWDER-FREE SURGICAL GLOVES WITH EMOLLIENT COATING: Brand: PROTEXIS

## (undated) DEVICE — STERILE POLYISOPRENE POWDER-FREE SURGICAL GLOVES: Brand: PROTEXIS

## (undated) DEVICE — SUT VICRYL 4-0 RB-1 27 IN J214H

## (undated) DEVICE — SURGICAL CLIPPER BLADE GENERAL USE

## (undated) DEVICE — 1820 FOAM BLOCK NEEDLE COUNTER: Brand: DEVON

## (undated) DEVICE — PENCIL ELECTROSURG E-Z CLEAN -0035H

## (undated) DEVICE — CHLORAPREP HI-LITE 10.5ML ORANGE

## (undated) DEVICE — HEAD DONUT FOAM POSITIONER: Brand: CARDINAL HEALTH

## (undated) DEVICE — ELECTRODE NEEDLE MOD E-Z CLEAN 2.75IN 7CM -0013M

## (undated) DEVICE — CRADLE EXTREMITY UNIVERSAL CONTOURED

## (undated) DEVICE — SKIN MARKER DUAL TIP WITH RULER CAP, FLEXIBLE RULER AND LABELS: Brand: DEVON

## (undated) DEVICE — NEEDLE 25G X 1 1/2

## (undated) DEVICE — ADHESIVE SKIN HIGH VISCOSITY EXOFIN 1ML

## (undated) DEVICE — SUT MONOCRYL 4-0 PS-2 27 IN Y426H

## (undated) DEVICE — SYRINGE 10ML LL

## (undated) DEVICE — BASIC PACK: Brand: CONVERTORS

## (undated) DEVICE — NEEDLE BLUNT 18 G X 1 1/2IN

## (undated) DEVICE — SWABSTCK, BENZOIN TINCTURE, 1/PK, STRL: Brand: APLICARE